# Patient Record
Sex: FEMALE | Race: BLACK OR AFRICAN AMERICAN | NOT HISPANIC OR LATINO | Employment: FULL TIME | URBAN - METROPOLITAN AREA
[De-identification: names, ages, dates, MRNs, and addresses within clinical notes are randomized per-mention and may not be internally consistent; named-entity substitution may affect disease eponyms.]

---

## 2017-01-16 ENCOUNTER — HOSPITAL ENCOUNTER (EMERGENCY)
Facility: OTHER | Age: 22
Discharge: HOME | End: 2017-01-16
Attending: EMERGENCY MEDICINE
Payer: MEDICAID

## 2017-01-16 VITALS
RESPIRATION RATE: 18 BRPM | HEART RATE: 97 BPM | OXYGEN SATURATION: 100 % | DIASTOLIC BLOOD PRESSURE: 89 MMHG | BODY MASS INDEX: 28.66 KG/M2 | SYSTOLIC BLOOD PRESSURE: 144 MMHG | HEIGHT: 65 IN | WEIGHT: 172 LBS | TEMPERATURE: 98 F

## 2017-01-16 DIAGNOSIS — S05.02XA CORNEAL ABRASION, LEFT, INITIAL ENCOUNTER: Primary | ICD-10-CM

## 2017-01-16 PROCEDURE — 99283 EMERGENCY DEPT VISIT LOW MDM: CPT

## 2017-01-16 PROCEDURE — 25000003 PHARM REV CODE 250: Performed by: EMERGENCY MEDICINE

## 2017-01-16 RX ORDER — PROPARACAINE HYDROCHLORIDE 5 MG/ML
2 SOLUTION/ DROPS OPHTHALMIC
Status: COMPLETED | OUTPATIENT
Start: 2017-01-16 | End: 2017-01-16

## 2017-01-16 RX ORDER — TETRACAINE HYDROCHLORIDE 5 MG/ML
2 SOLUTION OPHTHALMIC
Status: DISCONTINUED | OUTPATIENT
Start: 2017-01-16 | End: 2017-01-16

## 2017-01-16 RX ADMIN — TOBRAMYCIN 0.5 INCH: 3 OINTMENT OPHTHALMIC at 05:01

## 2017-01-16 RX ADMIN — PROPARACAINE HYDROCHLORIDE 2 DROP: 5 SOLUTION/ DROPS OPHTHALMIC at 05:01

## 2017-01-16 RX ADMIN — FLUORESCEIN SODIUM 1 STRIP: 1 STRIP OPHTHALMIC at 05:01
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
## 2017-01-16 NOTE — ED AVS SNAPSHOT
[facility] EMERGENCY DEPARTMENT  [address]  [place] [address]               Mackenzie Smart   2017  4:51 PM   ED    Description:  Female : 1995   Department:  [facility] Emergency Department           Your Care was Coordinated By:     Provider Role From To    Genesis Flores MD Attending Provider 17 [phone] --      Reason for Visit     Eye Injury           Diagnoses this Visit        Comments    Corneal abrasion, left, initial encounter    -  Primary       ED Disposition     ED Disposition Condition Comment    Discharge             To Do List           Follow-up Information     Please follow up.    Why:  [facility] Referral Line (Tel: [phone])       These Medications        Disp Refills Start End    tobramycin sulfate 0.3% (TOBREX) 0.3 % ophthalmic ointment 1 Tube 0 2017    Place into the left eye 3 (three) times daily. - Left Eye    Pharmacy: [facility] [address] [place] [address] [address] #: [phone]         [facility] On Call     [facility] On Call Nurse Care Line -  Assistance  Registered nurses in the [facility] provide clinical advisement, health education, appointment booking, and other advisory services.  Call for this free service at [phone].             Medications           Message regarding Medications     Verify the changes and/or additions to your medication regime listed below are the same as discussed with your clinician today.  If any of these changes or additions are incorrect, please notify your healthcare provider.        START taking these NEW medications        Refills    tobramycin sulfate 0.3% (TOBREX) 0.3 % ophthalmic ointment 0    Sig: Place into the left eye 3 (three) times daily.    Class: Print    Route: Left Eye      These medications were administered today        Dose Freq    fluorescein ophthalmic strip 1 strip 1 strip ED 1 Time    Sig: Place 1 strip into the  left eye ED 1 Time.    Class: Normal    Route: Left Eye    proparacaine 0.5 % ophthalmic solution 2 drop 2 drop ED 1 Time    Sig: Place 2 drops into the left eye ED 1 Time.    Class: Normal    Route: Left Eye    tobramycin sulfate 0.3% ophthalmic ointment 0.5 inch 0.5 inch ED 1 Time    Sig: Place 0.5 inches into the left eye ED 1 Time.    Class: Normal    Route: Left Eye           Verify that the below list of medications is an accurate representation of the medications you are currently taking.  If none reported, the list may be blank. If incorrect, please contact your healthcare provider. Carry this list with you in case of emergency.           Current Medications     albuterol (VENTOLIN HFA) 90 mcg/actuation inhaler Inhale 2 puffs into the lungs every 6 (six) hours as needed for Wheezing.    ferrous gluconate (FERGON) 324 MG tablet Take 1 tablet (324 mg total) by mouth daily with breakfast.    fluorescein ophthalmic strip 1 strip Place 1 strip into the left eye ED 1 Time.    medroxyPROGESTERone (DEPO-PROVERA) 150 mg/mL Syrg Inject 1 mL (150 mg total) into the muscle every 3 (three) months. Bring to the office for administration.    medroxyPROGESTERone (DEPO-PROVERA) syringe 150 mg Inject 1 mL (150 mg total) into the muscle every 3 (three) months.    oxycodone-acetaminophen (PERCOCET) 5-325 mg per tablet Take 1 tablet by mouth every 4 (four) hours as needed for Pain (severe pain).    prenatal vit#103-iron-FA () 27 mg iron- 1 mg Tab Take 1 tablet by mouth once daily.    proparacaine 0.5 % ophthalmic solution 2 drop Place 2 drops into the left eye ED 1 Time.    tobramycin sulfate 0.3% (TOBREX) 0.3 % ophthalmic ointment Place into the left eye 3 (three) times daily.    tobramycin sulfate 0.3% ophthalmic ointment 0.5 inch Place 0.5 inches into the left eye ED 1 Time.           Clinical Reference Information           Your Vitals Were     BP Pulse Temp Resp Height Weight    144/89 (BP Location: Left arm, Patient  "Position: Sitting) 97 98 °F (36.7 °C) (Temporal) 18 5' 5" (1.651 m) 78 kg (172 lb)    SpO2 BMI             100% 28.62 kg/m2         Allergies as of 1/16/2017     No Known Allergies      Immunizations Administered on Date of Encounter - 1/16/2017     None      ED Micro, Lab, POCT     None      ED Imaging Orders     None        Discharge Instructions             Corneal Injury  An eye injury can hurt your cornea. Your cornea is the clear layer on the front of your eye. It protects your eye from dust and germs, and helps filter out harmful UV (ultraviolet) rays. The cornea also helps to focus light entering your eye. Your cornea is made of strong proteins, but it can be damaged. A slight cut or scratch (abrasion) to the cornea can be very painful. But that is often minor and can heal within 1 or 2 days. A bad abrasion or a hole (puncture) in the cornea can be very serious. These are medical emergencies.     A blow to the eye can cause damage to the cornea (front layer of the eye).   Something in your eye  If you think you have something small in your eye, flush it with water right away. Pull your upper lid out and over your bottom lid. This will help increase the flow of tears across your eye. If these methods dont work, call your healthcare provider. Never try to remove an object from your eye that doesnt flush out easily with water. Doing so may cause more damage.  When to go to the emergency room (ER)  Call 911 or your local emergency number if you have:  · Severe eye pain  · A puncture injury or bad abrasion  · Something in your eye that you cant flush out with water  · A very swollen or painful eye after removing an object  · A chemical burn  · An object embedded in your eye. (Cover both eyes with a sterile compress and keep both eyes closed while you wait for help. DO NOT put any pressure on your eyes.)  What to expect in the ER  For minor abrasions   Minor abrasions are usually treated with eye drops or " ointment. You may be given antibiotics to prevent infection. Most abrasions heal in 1 or 2 days. To help rule out more serious injuries, you may have tests including:  · A standard eye exam to check how well you can see  · A Ping test, which uses a special dye to look for severe eye damage  Depending on the results of these tests, you may be referred to an eye specialist (ophthalmologist).  For serious abrasions or punctures  You will be referred directly to an ophthalmologist for emergency treatment. An eye specialist is needed to reduce further damage and possible vision loss.  © 9359-9838 [employer]. [address] [place] [address]. All rights reserved. This information is not intended as a substitute for professional medical care. Always follow your healthcare professional's instructions.          Your Scheduled Appointments     Jan 20, 2017  1:30 PM CST   Pre Op with Rober Sy MD   [facility])    [address] [place] [address]   [phone]            Jan 20, 2017  2:30 PM CST   Pre-Admit Testing Visit with PRE-ADMIT 1, [facility])    [address] [place] [id number] [phone]              Your Future Surgeries/Procedures     Jan 23, 2017   Surgery with Rober Sy MD   [facility] ([facility])    [address] [place] [address]   [phone]              [facility] Sign-Up     Activating your [facility] account is as easy as 1-2-3!     1) Visit [email or web address], select Sign Up Now, enter this activation code and your date of birth, then select Next.  [email or web address]  Expires: 1/17/2017  8:53 AM      2) Create a username and password to use when you visit [facility] in the future and select a security question in case you lose your password and select Next.    3) Enter your e-mail address and click Sign Up!    Additional Information  If you  have questions, please e-mail [email or web address] or call [phone] to talk to our [facility] staff. Remember, [facility] is NOT to be used for urgent needs. For medical emergencies, dial 911.          [facility] Emergency Department complies with applicable Federal civil rights laws and does not discriminate on the basis of race, color, national origin, age, disability, or sex.        Language Assistance Services     ATTENTION: Language assistance services are available, free of charge. Please call [phone].      ATENCIÓN: Si habla español, tiene a gonzalez disposición servicios gratuitos de asistencia lingüística. Llame al [phone].     CHÚ Ý: N?u b?n nói Ti?ng Vi?t, có các d?ch v? h? tr? ngôn ng? mi?n phí dành cho b?n. G?i s? [phone].

## 2017-01-16 NOTE — DISCHARGE INSTRUCTIONS
Corneal Injury  An eye injury can hurt your cornea. Your cornea is the clear layer on the front of your eye. It protects your eye from dust and germs, and helps filter out harmful UV (ultraviolet) rays. The cornea also helps to focus light entering your eye. Your cornea is made of strong proteins, but it can be damaged. A slight cut or scratch (abrasion) to the cornea can be very painful. But that is often minor and can heal within 1 or 2 days. A bad abrasion or a hole (puncture) in the cornea can be very serious. These are medical emergencies.     A blow to the eye can cause damage to the cornea (front layer of the eye).   Something in your eye  If you think you have something small in your eye, flush it with water right away. Pull your upper lid out and over your bottom lid. This will help increase the flow of tears across your eye. If these methods dont work, call your healthcare provider. Never try to remove an object from your eye that doesnt flush out easily with water. Doing so may cause more damage.  When to go to the emergency room (ER)  Call 911 or your local emergency number if you have:  · Severe eye pain  · A puncture injury or bad abrasion  · Something in your eye that you cant flush out with water  · A very swollen or painful eye after removing an object  · A chemical burn  · An object embedded in your eye. (Cover both eyes with a sterile compress and keep both eyes closed while you wait for help. DO NOT put any pressure on your eyes.)  What to expect in the ER  For minor abrasions   Minor abrasions are usually treated with eye drops or ointment. You may be given antibiotics to prevent infection. Most abrasions heal in 1 or 2 days. To help rule out more serious injuries, you may have tests including:  · A standard eye exam to check how well you can see  · A Ping test, which uses a special dye to look for severe eye damage  Depending on the results of these tests, you may be referred to an  eye specialist (ophthalmologist).  For serious abrasions or punctures  You will be referred directly to an ophthalmologist for emergency treatment. An eye specialist is needed to reduce further damage and possible vision loss.  © 1660-2361 [employer]. [address] [place] [address], [place] [address]. All rights reserved. This information is not intended as a substitute for professional medical care. Always follow your healthcare professional's instructions.

## 2017-01-20 ENCOUNTER — TELEPHONE (OUTPATIENT)
Dept: OPHTHALMOLOGY | Facility: CLINIC | Age: 22
End: 2017-01-20

## 2017-01-20 ENCOUNTER — OFFICE VISIT (OUTPATIENT)
Dept: OPHTHALMOLOGY | Facility: CLINIC | Age: 22
End: 2017-01-20
Payer: MEDICAID

## 2017-01-20 DIAGNOSIS — H20.9 TRAUMATIC IRITIS: Primary | ICD-10-CM

## 2017-01-20 PROCEDURE — 99999 PR PBB SHADOW E&M-EST. PATIENT-LVL III: CPT | Mod: PBBFAC,,, | Performed by: OPHTHALMOLOGY

## 2017-01-20 PROCEDURE — 99213 OFFICE O/P EST LOW 20 MIN: CPT | Mod: PBBFAC | Performed by: OPHTHALMOLOGY

## 2017-01-20 PROCEDURE — 92004 COMPRE OPH EXAM NEW PT 1/>: CPT | Mod: S$PBB,,, | Performed by: OPHTHALMOLOGY

## 2017-01-20 RX ORDER — PREDNISOLONE ACETATE 10 MG/ML
1 SUSPENSION/ DROPS OPHTHALMIC 4 TIMES DAILY
Qty: 10 ML | Refills: 1 | Status: SHIPPED | OUTPATIENT
Start: 2017-01-20 | End: 2017-02-01 | Stop reason: CLARIF
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
## 2017-01-20 NOTE — LETTER
January 20, 2017      [facility] - Ophthalmology  [address]  [facility] [address]  Phone: [phone]  Fax: [phone]       Patient: Mackenzie Smart   YOB: 1995  Date of Visit: 01/20/2017    To Whom It May Concern:    Mackenzie was at [facility] on 01/20/2017. Please excuse patient from 1/15/2017 to 1/20/2017.  She may return to work on 1/22/2017 no  restrictions. If you have any questions or concerns, or if I can be of further assistance, please do not hesitate to contact me.    Sincerely,  Dr. Chiara Roa M.D.    Rober Lazcano MA

## 2017-01-20 NOTE — MR AVS SNAPSHOT
[facility]  [address]  [facility] [address]  Phone: [phone]  Fax: [phone]                  Mackenzie Smart   2017 1:00 PM   Office Visit    Description:  Female : 1995   Provider:  Chiara Roa MD   Department:  [facility]           Reason for Visit     Corneal abrasion           Diagnoses this Visit        Comments    Traumatic iritis    -  Primary            To Do List           Future Appointments        Provider Department Dept Phone    2017  2:30 PM PRE-ADMIT 1, [facility] [phone]      Your Future Surgeries/Procedures     2017   Surgery with Rober Sy MD   [facility] ([facility])    [address] [place] [id number] [phone]              Goals (5 Years of Data)     None       These Medications        Disp Refills Start End    prednisoLONE acetate (PRED FORTE) 1 % DrpS 10 mL 1 2017    Place 1 drop into the left eye 4 (four) times daily. - Left Eye    Pharmacy: [facility] [address] [place] [address] [facility] Ph #: [phone]       homatropine (ISOPTO HOMATROPINE) 2 % ophthalmic solution 5 mL 0 2017     Place 1 drop into the left eye once daily. - Left Eye    Pharmacy: [facility] [address] [place] [address] [address] [facility] Ph #: [phone]         [facility] On Call     [facility] Nurse Care Line -  Assistance  Registered nurses in the [facility] provide clinical advisement, health education, appointment booking, and other advisory services.  Call for this free service at [phone].             Medications           Message regarding Medications     Verify the changes and/or additions to your medication regime listed below are the same as discussed with your clinician today.  If any of these changes or additions are  incorrect, please notify your healthcare provider.        START taking these NEW medications        Refills    prednisoLONE acetate (PRED FORTE) 1 % DrpS 1    Sig: Place 1 drop into the left eye 4 (four) times daily.    Class: Normal    Route: Left Eye    homatropine (ISOPTO HOMATROPINE) 2 % ophthalmic solution 0    Sig: Place 1 drop into the left eye once daily.    Class: Normal    Route: Left Eye      STOP taking these medications     oxycodone-acetaminophen (PERCOCET) 5-325 mg per tablet Take 1 tablet by mouth every 4 (four) hours as needed for Pain (severe pain).           Verify that the below list of medications is an accurate representation of the medications you are currently taking.  If none reported, the list may be blank. If incorrect, please contact your healthcare provider. Carry this list with you in case of emergency.           Current Medications     albuterol (VENTOLIN HFA) 90 mcg/actuation inhaler Inhale 2 puffs into the lungs every 6 (six) hours as needed for Wheezing.    ferrous gluconate (FERGON) 324 MG tablet Take 1 tablet (324 mg total) by mouth daily with breakfast.    homatropine (ISOPTO HOMATROPINE) 2 % ophthalmic solution Place 1 drop into the left eye once daily.    medroxyPROGESTERone (DEPO-PROVERA) 150 mg/mL Syrg Inject 1 mL (150 mg total) into the muscle every 3 (three) months. Bring to the office for administration.    prednisoLONE acetate (PRED FORTE) 1 % DrpS Place 1 drop into the left eye 4 (four) times daily.    prenatal vit#103-iron-FA () 27 mg iron- 1 mg Tab Take 1 tablet by mouth once daily.    tobramycin sulfate 0.3% (TOBREX) 0.3 % ophthalmic ointment Place into the left eye 3 (three) times daily.           Clinical Reference Information           Allergies as of 1/20/2017     No Known Allergies      Immunizations Administered on Date of Encounter - 1/20/2017     None      [facility] Sign-Up     Activating your for; to ([facility] account is as easy as 1-2-3!     1) Visit  [email or web address], select Sign Up Now, enter this activation code and your date of birth, then select Next.  [email or web address]  Expires: 3/6/2017  1:29 PM      2) Create a username and password to use when you visit [facility] in the future and select a security question in case you lose your password and select Next.    3) Enter your e-mail address and click Sign Up!    Additional Information  If you have questions, please e-mail [email or web address] or call [phone] to talk to our [facility] staff. Remember, [facility] is NOT to be used for urgent needs. For medical emergencies, dial 911.         Instructions    PRED FORTE - SHAKE WELL - 4 TIMES A DAY LEFT EYE    HOMATROPINE (RED TOP) - ONCE A DAY LEFT EYE

## 2017-01-20 NOTE — LETTER
January 20, 2017      [facility] - Ophthalmology  [address]  [place] [address]  Phone: [phone]  Fax: [phone]       Patient: Mackenzie Smart   YOB: 1995  Date of Visit: 01/20/2017    To Whom It May Concern:    Mackenzie was at [facility] on 01/20/2017. She may return to work on 1/22/2017 no restrictions. If you have any questions or concerns, or if I can be of further assistance, please do not hesitate to contact me.    Sincerely,  Dr.Ginny Crispin WADE.    Rober Lazcano MA

## 2017-01-31 ENCOUNTER — ANESTHESIA EVENT (OUTPATIENT)
Dept: SURGERY | Facility: HOSPITAL | Age: 22
End: 2017-01-31
Payer: MEDICAID

## 2017-02-01 ENCOUNTER — HOSPITAL ENCOUNTER (OUTPATIENT)
Facility: HOSPITAL | Age: 22
Discharge: HOME | End: 2017-02-01
Attending: OBSTETRICS & GYNECOLOGY | Admitting: OBSTETRICS & GYNECOLOGY
Payer: MEDICAID

## 2017-02-01 ENCOUNTER — ANESTHESIA (OUTPATIENT)
Dept: SURGERY | Facility: HOSPITAL | Age: 22
End: 2017-02-01
Payer: MEDICAID

## 2017-02-01 ENCOUNTER — HOSPITAL ENCOUNTER (OUTPATIENT)
Dept: PREADMISSION TESTING | Facility: HOSPITAL | Age: 22
Discharge: HOME | End: 2017-02-01
Attending: OBSTETRICS & GYNECOLOGY
Payer: MEDICAID

## 2017-02-01 VITALS
SYSTOLIC BLOOD PRESSURE: 124 MMHG | TEMPERATURE: 98 F | DIASTOLIC BLOOD PRESSURE: 78 MMHG | HEART RATE: 80 BPM | RESPIRATION RATE: 20 BRPM | OXYGEN SATURATION: 99 %

## 2017-02-01 VITALS
HEIGHT: 65 IN | DIASTOLIC BLOOD PRESSURE: 98 MMHG | SYSTOLIC BLOOD PRESSURE: 135 MMHG | OXYGEN SATURATION: 98 % | WEIGHT: 167.56 LBS | BODY MASS INDEX: 27.92 KG/M2 | RESPIRATION RATE: 18 BRPM | TEMPERATURE: 98 F | HEART RATE: 86 BPM

## 2017-02-01 DIAGNOSIS — N89.8 VAGINAL LACERATION, OLD: ICD-10-CM

## 2017-02-01 LAB
ABO + RH BLD: NORMAL
B-HCG UR QL: NEGATIVE
BASOPHILS # BLD AUTO: 0.04 K/UL
BASOPHILS NFR BLD: 0.4 %
BILIRUB UR QL STRIP: NEGATIVE
BLD GP AB SCN CELLS X3 SERPL QL: NORMAL
CLARITY UR: CLEAR
COLOR UR: ABNORMAL
DIFFERENTIAL METHOD: ABNORMAL
EOSINOPHIL # BLD AUTO: 0.6 K/UL
EOSINOPHIL NFR BLD: 6.3 %
ERYTHROCYTE [DISTWIDTH] IN BLOOD BY AUTOMATED COUNT: 16 %
GLUCOSE UR QL STRIP: NEGATIVE
HCT VFR BLD AUTO: 35.8 %
HGB BLD-MCNC: 11.7 G/DL
HGB UR QL STRIP: ABNORMAL
KETONES UR QL STRIP: NEGATIVE
LEUKOCYTE ESTERASE UR QL STRIP: NEGATIVE
LYMPHOCYTES # BLD AUTO: 1.2 K/UL
LYMPHOCYTES NFR BLD: 11.2 %
MCH RBC QN AUTO: 27 PG
MCHC RBC AUTO-ENTMCNC: 32.7 %
MCV RBC AUTO: 83 FL
MICROSCOPIC COMMENT: NORMAL
MONOCYTES # BLD AUTO: 0.6 K/UL
MONOCYTES NFR BLD: 5.4 %
NEUTROPHILS # BLD AUTO: 7.9 K/UL
NEUTROPHILS NFR BLD: 76.7 %
NITRITE UR QL STRIP: NEGATIVE
PH UR STRIP: 7 [PH] (ref 5–8)
PLATELET # BLD AUTO: 355 K/UL
PMV BLD AUTO: 9.6 FL
PROT UR QL STRIP: NEGATIVE
RBC # BLD AUTO: 4.33 M/UL
RBC #/AREA URNS HPF: 0 /HPF (ref 0–4)
SP GR UR STRIP: 1.01 (ref 1–1.03)
SQUAMOUS #/AREA URNS HPF: NORMAL /HPF
URN SPEC COLLECT METH UR: ABNORMAL
UROBILINOGEN UR STRIP-ACNC: NEGATIVE EU/DL
WBC # BLD AUTO: 10.24 K/UL

## 2017-02-01 PROCEDURE — 81000 URINALYSIS NONAUTO W/SCOPE: CPT

## 2017-02-01 PROCEDURE — D9220A PRA ANESTHESIA: Mod: CRNA,,, | Performed by: REGISTERED NURSE

## 2017-02-01 PROCEDURE — 36000707: Performed by: OBSTETRICS & GYNECOLOGY

## 2017-02-01 PROCEDURE — 25000003 PHARM REV CODE 250: Performed by: REGISTERED NURSE

## 2017-02-01 PROCEDURE — 81025 URINE PREGNANCY TEST: CPT

## 2017-02-01 PROCEDURE — 86900 BLOOD TYPING SEROLOGIC ABO: CPT

## 2017-02-01 PROCEDURE — 63600175 PHARM REV CODE 636 W HCPCS: Performed by: OBSTETRICS & GYNECOLOGY

## 2017-02-01 PROCEDURE — 27200651 HC AIRWAY, LMA: Performed by: REGISTERED NURSE

## 2017-02-01 PROCEDURE — 36415 COLL VENOUS BLD VENIPUNCTURE: CPT

## 2017-02-01 PROCEDURE — 86850 RBC ANTIBODY SCREEN: CPT

## 2017-02-01 PROCEDURE — 85025 COMPLETE CBC W/AUTO DIFF WBC: CPT

## 2017-02-01 PROCEDURE — 71000016 HC POSTOP RECOV ADDL HR: Performed by: OBSTETRICS & GYNECOLOGY

## 2017-02-01 PROCEDURE — 71000015 HC POSTOP RECOV 1ST HR: Performed by: OBSTETRICS & GYNECOLOGY

## 2017-02-01 PROCEDURE — 25000003 PHARM REV CODE 250: Performed by: ANESTHESIOLOGY

## 2017-02-01 PROCEDURE — 63600175 PHARM REV CODE 636 W HCPCS: Performed by: ANESTHESIOLOGY

## 2017-02-01 PROCEDURE — D9220A PRA ANESTHESIA: Mod: ANES,,, | Performed by: ANESTHESIOLOGY

## 2017-02-01 PROCEDURE — 37000009 HC ANESTHESIA EA ADD 15 MINS: Performed by: OBSTETRICS & GYNECOLOGY

## 2017-02-01 PROCEDURE — 71000039 HC RECOVERY, EACH ADD'L HOUR: Performed by: OBSTETRICS & GYNECOLOGY

## 2017-02-01 PROCEDURE — 37000008 HC ANESTHESIA 1ST 15 MINUTES: Performed by: OBSTETRICS & GYNECOLOGY

## 2017-02-01 PROCEDURE — 71000033 HC RECOVERY, INTIAL HOUR: Performed by: OBSTETRICS & GYNECOLOGY

## 2017-02-01 PROCEDURE — 63600175 PHARM REV CODE 636 W HCPCS: Performed by: REGISTERED NURSE

## 2017-02-01 PROCEDURE — 36000706: Performed by: OBSTETRICS & GYNECOLOGY

## 2017-02-01 RX ORDER — CEFAZOLIN SODIUM 2 G/50ML
2 SOLUTION INTRAVENOUS
Status: COMPLETED | OUTPATIENT
Start: 2017-02-01 | End: 2017-02-01

## 2017-02-01 RX ORDER — DIPHENHYDRAMINE HYDROCHLORIDE 50 MG/ML
25 INJECTION INTRAMUSCULAR; INTRAVENOUS EVERY 4 HOURS PRN
Status: DISCONTINUED | OUTPATIENT
Start: 2017-02-01 | End: 2017-02-01 | Stop reason: HOSPADM

## 2017-02-01 RX ORDER — HYDROCODONE BITARTRATE AND ACETAMINOPHEN 5; 325 MG/1; MG/1
1 TABLET ORAL EVERY 4 HOURS PRN
Status: DISCONTINUED | OUTPATIENT
Start: 2017-02-01 | End: 2017-02-01 | Stop reason: HOSPADM

## 2017-02-01 RX ORDER — SODIUM CHLORIDE 0.9 % (FLUSH) 0.9 %
3 SYRINGE (ML) INJECTION EVERY 8 HOURS
Status: DISCONTINUED | OUTPATIENT
Start: 2017-02-01 | End: 2017-02-01 | Stop reason: HOSPADM

## 2017-02-01 RX ORDER — IBUPROFEN 400 MG/1
800 TABLET ORAL EVERY 8 HOURS
Status: DISCONTINUED | OUTPATIENT
Start: 2017-02-02 | End: 2017-02-01 | Stop reason: HOSPADM

## 2017-02-01 RX ORDER — IBUPROFEN 800 MG/1
800 TABLET ORAL EVERY 8 HOURS PRN
Qty: 30 TABLET | Refills: 2 | Status: SHIPPED | OUTPATIENT
Start: 2017-02-01 | End: 2017-03-30

## 2017-02-01 RX ORDER — SODIUM CHLORIDE, SODIUM LACTATE, POTASSIUM CHLORIDE, CALCIUM CHLORIDE 600; 310; 30; 20 MG/100ML; MG/100ML; MG/100ML; MG/100ML
INJECTION, SOLUTION INTRAVENOUS CONTINUOUS
Status: DISCONTINUED | OUTPATIENT
Start: 2017-02-01 | End: 2017-02-01 | Stop reason: HOSPADM

## 2017-02-01 RX ORDER — MIDAZOLAM HYDROCHLORIDE 1 MG/ML
INJECTION, SOLUTION INTRAMUSCULAR; INTRAVENOUS
Status: DISCONTINUED | OUTPATIENT
Start: 2017-02-01 | End: 2017-02-01

## 2017-02-01 RX ORDER — FENTANYL CITRATE 50 UG/ML
25 INJECTION, SOLUTION INTRAMUSCULAR; INTRAVENOUS EVERY 5 MIN PRN
Status: DISCONTINUED | OUTPATIENT
Start: 2017-02-01 | End: 2017-02-01 | Stop reason: HOSPADM

## 2017-02-01 RX ORDER — LIDOCAINE HYDROCHLORIDE 10 MG/ML
1 INJECTION, SOLUTION EPIDURAL; INFILTRATION; INTRACAUDAL; PERINEURAL ONCE
Status: DISCONTINUED | OUTPATIENT
Start: 2017-02-01 | End: 2017-02-01 | Stop reason: HOSPADM

## 2017-02-01 RX ORDER — ONDANSETRON 8 MG/1
8 TABLET, ORALLY DISINTEGRATING ORAL EVERY 8 HOURS PRN
Status: DISCONTINUED | OUTPATIENT
Start: 2017-02-01 | End: 2017-02-01 | Stop reason: HOSPADM

## 2017-02-01 RX ORDER — ONDANSETRON 8 MG/1
8 TABLET, ORALLY DISINTEGRATING ORAL EVERY 8 HOURS PRN
Status: DISCONTINUED | OUTPATIENT
Start: 2017-02-01 | End: 2017-02-01 | Stop reason: ALTCHOICE

## 2017-02-01 RX ORDER — METOCLOPRAMIDE HYDROCHLORIDE 5 MG/ML
INJECTION INTRAMUSCULAR; INTRAVENOUS
Status: DISCONTINUED | OUTPATIENT
Start: 2017-02-01 | End: 2017-02-01

## 2017-02-01 RX ORDER — LIDOCAINE HCL/PF 100 MG/5ML
SYRINGE (ML) INTRAVENOUS
Status: DISCONTINUED | OUTPATIENT
Start: 2017-02-01 | End: 2017-02-01

## 2017-02-01 RX ORDER — FENTANYL CITRATE 50 UG/ML
INJECTION, SOLUTION INTRAMUSCULAR; INTRAVENOUS
Status: DISCONTINUED | OUTPATIENT
Start: 2017-02-01 | End: 2017-02-01

## 2017-02-01 RX ORDER — KETOROLAC TROMETHAMINE 30 MG/ML
30 INJECTION, SOLUTION INTRAMUSCULAR; INTRAVENOUS EVERY 6 HOURS
Status: DISCONTINUED | OUTPATIENT
Start: 2017-02-01 | End: 2017-02-01 | Stop reason: HOSPADM

## 2017-02-01 RX ORDER — HYDROMORPHONE HYDROCHLORIDE 2 MG/ML
0.2 INJECTION, SOLUTION INTRAMUSCULAR; INTRAVENOUS; SUBCUTANEOUS EVERY 5 MIN PRN
Status: DISCONTINUED | OUTPATIENT
Start: 2017-02-01 | End: 2017-02-01 | Stop reason: HOSPADM

## 2017-02-01 RX ORDER — CEFAZOLIN SODIUM 2 G/50ML
SOLUTION INTRAVENOUS
Status: DISCONTINUED
Start: 2017-02-01 | End: 2017-02-01 | Stop reason: HOSPADM

## 2017-02-01 RX ORDER — OXYCODONE AND ACETAMINOPHEN 5; 325 MG/1; MG/1
1 TABLET ORAL EVERY 4 HOURS PRN
Qty: 30 TABLET | Refills: 0 | Status: SHIPPED | OUTPATIENT
Start: 2017-02-01 | End: 2017-02-09 | Stop reason: SDUPTHER

## 2017-02-01 RX ORDER — DIPHENHYDRAMINE HCL 25 MG
25 CAPSULE ORAL EVERY 4 HOURS PRN
Status: DISCONTINUED | OUTPATIENT
Start: 2017-02-01 | End: 2017-02-01 | Stop reason: HOSPADM

## 2017-02-01 RX ORDER — SODIUM CHLORIDE 0.9 % (FLUSH) 0.9 %
3 SYRINGE (ML) INJECTION
Status: DISCONTINUED | OUTPATIENT
Start: 2017-02-01 | End: 2017-02-01 | Stop reason: HOSPADM

## 2017-02-01 RX ORDER — PROPOFOL 10 MG/ML
VIAL (ML) INTRAVENOUS
Status: DISCONTINUED | OUTPATIENT
Start: 2017-02-01 | End: 2017-02-01

## 2017-02-01 RX ADMIN — HYDROMORPHONE HYDROCHLORIDE 0.2 MG: 2 INJECTION INTRAMUSCULAR; INTRAVENOUS; SUBCUTANEOUS at 01:02

## 2017-02-01 RX ADMIN — FENTANYL CITRATE 50 MCG: 50 INJECTION INTRAMUSCULAR; INTRAVENOUS at 11:02

## 2017-02-01 RX ADMIN — CEFAZOLIN SODIUM 2 G: 2 SOLUTION INTRAVENOUS at 11:02

## 2017-02-01 RX ADMIN — HYDROMORPHONE HYDROCHLORIDE 0.2 MG: 2 INJECTION INTRAMUSCULAR; INTRAVENOUS; SUBCUTANEOUS at 12:02

## 2017-02-01 RX ADMIN — FENTANYL CITRATE 25 MCG: 50 INJECTION, SOLUTION INTRAMUSCULAR; INTRAVENOUS at 12:02

## 2017-02-01 RX ADMIN — MIDAZOLAM HYDROCHLORIDE 2 MG: 1 INJECTION, SOLUTION INTRAMUSCULAR; INTRAVENOUS at 11:02

## 2017-02-01 RX ADMIN — METOCLOPRAMIDE 10 MG: 5 INJECTION, SOLUTION INTRAMUSCULAR; INTRAVENOUS at 11:02

## 2017-02-01 RX ADMIN — PROPOFOL 200 MG: 10 INJECTION, EMULSION INTRAVENOUS at 11:02

## 2017-02-01 RX ADMIN — SODIUM CHLORIDE, SODIUM LACTATE, POTASSIUM CHLORIDE, AND CALCIUM CHLORIDE: .6; .31; .03; .02 INJECTION, SOLUTION INTRAVENOUS at 09:02

## 2017-02-01 RX ADMIN — LIDOCAINE HYDROCHLORIDE 100 MG: 20 INJECTION, SOLUTION INTRAVENOUS at 11:02

## 2017-02-01 RX ADMIN — PROPOFOL 50 MG: 10 INJECTION, EMULSION INTRAVENOUS at 11:02

## 2017-02-01 NOTE — ANESTHESIA PREPROCEDURE EVALUATION
02/01/2017  Mackenzie Smart is a 21 y.o., female.    [facility] Anesthesia Evaluation    I have reviewed the Patient Summary Reports.     I have reviewed the Medications.     Review of Systems  Anesthesia Hx:  History of prior surgery of interest to airway management or planning:  Denies Personal Hx of Anesthesia complications.   Hematology/Oncology:  Hematology Normal        Cardiovascular:  Cardiovascular Normal     Pulmonary:   Asthma    Renal/:  Renal/ Normal     Hepatic/GI:  Hepatic/GI Normal    Endocrine:  Endocrine Normal        Physical Exam  General:  Well nourished    Airway/Jaw/Neck:  Airway Findings: Mouth Opening: Normal General Airway Assessment: Adult  Mallampati: II  TM Distance: Normal, at least 6 cm         Dental:  DENTAL FINDINGS: Normal   Chest/Lungs:  Chest/Lungs Clear    Heart/Vascular:  Heart Findings: Normal Heart murmur: negative       Mental Status:  Mental Status Findings:  Cooperative, Alert and Oriented         Anesthesia Plan  Type of Anesthesia, risks & benefits discussed:  Anesthesia Type:  general  Patient's Preference:   Intra-op Monitoring Plan:   Intra-op Monitoring Plan Comments:   Post Op Pain Control Plan:   Post Op Pain Control Plan Comments:   Induction:   IV  Beta Blocker:  Patient is not currently on a Beta-Blocker (No further documentation required).       Informed Consent: Patient understands risks and agrees with Anesthesia plan.  Questions answered. Anesthesia consent signed with patient.  ASA Score: 2     Day of Surgery Review of History & Physical:            Ready For Surgery From Anesthesia Perspective.

## 2017-02-01 NOTE — IP AVS SNAPSHOT
[place] [address] [place] [address]  Phone: [phone]           Patient Discharge Instructions     Our goal is to set you up for success. This packet includes information on your condition, medications, and your home care. It will help you to care for yourself so you don't get sicker and need to go back to the hospital.     Please ask your nurse if you have any questions.        There are many details to remember when preparing to leave the hospital. Here is what you will need to do:    1. Take your medicine. If you are prescribed medications, review your Medication List in the following pages. You may have new medications to  at the pharmacy and others that you'll need to stop taking. Review the instructions for how and when to take your medications. Talk with your doctor or nurses if you are unsure of what to do.     2. Go to your follow-up appointments. Specific follow-up information is listed in the following pages. Your may be contacted by a transition nurse or clinical provider about future appointments. Be sure we have all of the phone numbers to reach you, if needed. Please contact your provider's office if you are unable to make an appointment.     3. Watch for warning signs. Your doctor or nurse will give you detailed warning signs to watch for and when to call for assistance. These instructions may also include educational information about your condition. If you experience any of warning signs to your health, call your doctor.               ** Verify the list of medication(s) below is accurate and up to date. Carry this with you in case of emergency. If your medications have changed, please notify your healthcare provider.             Medication List      START taking these medications        Additional Info                      ibuprofen 800 MG tablet   Commonly known as:  ADVIL,MOTRIN   Quantity:  30 tablet   Refills:  2   Dose:  800 mg    Instructions:   Take 1 tablet (800 mg total) by mouth every 8 (eight) hours as needed for Pain.     Begin Date    AM    Noon    PM    Bedtime       oxycodone-acetaminophen 5-325 mg per tablet   Commonly known as:  PERCOCET   Quantity:  30 tablet   Refills:  0   Dose:  1 tablet    Instructions:  Take 1 tablet by mouth every 4 (four) hours as needed for Pain.     Begin Date    AM    Noon    PM    Bedtime         CONTINUE taking these medications        Additional Info                      albuterol 90 mcg/actuation inhaler   Commonly known as:  VENTOLIN HFA   Quantity:  18 g   Refills:  0   Dose:  2 puff    Instructions:  Inhale 2 puffs into the lungs every 6 (six) hours as needed for Wheezing.     Begin Date    AM    Noon    PM    Bedtime       ferrous gluconate 324 MG tablet   Commonly known as:  FERGON   Quantity:  30 tablet   Refills:  5   Dose:  324 mg    Instructions:  Take 1 tablet (324 mg total) by mouth daily with breakfast.     Begin Date    AM    Noon    PM    Bedtime       medroxyPROGESTERone 150 mg/mL Syrg   Commonly known as:  DEPO-PROVERA   Quantity:  1 Syringe   Refills:  3   Dose:  150 mg    Instructions:  Inject 1 mL (150 mg total) into the muscle every 3 (three) months. Bring to the office for administration.     Begin Date    AM    Noon    PM    Bedtime         STOP taking these medications     prenatal vit#103-iron-FA 27 mg iron- 1 mg Tab   Commonly known as:              Where to Get Your Medications      These medications were sent to [facility] [id number] [place] [address] [facility]  [address] [facility] [place] [address]    Hours:  24-hours Phone:  [phone]     ibuprofen 800 MG tablet    oxycodone-acetaminophen 5-325 mg per tablet                  Please bring to all follow up appointments:    1. A copy of your discharge instructions.  2. All medicines you are currently taking in their original bottles.  3. Identification and insurance card.    Please arrive  15 minutes ahead of scheduled appointment time.    Please call 24 hours in advance if you must reschedule your appointment and/or time.        Your Scheduled Appointments     Feb 06, 2017  9:30 AM CST   Post Op-OCC with Rober Sy MD   [facility]    [address] [place] [address]  [place] [address]   [phone]              Follow-up Information     Follow up In 1 week.          Discharge Instructions       ***  BATHING:                   You may shower after your dressing is removed, but no tub baths, hot tubs, saunas or swimming until you see the doctor.    DRESSING:  ? Remove your bandage ________________. If there are skin tapes over the incision, leave them in place. They will start to come off in 5-7 days.  ACTIVITY LEVEL: If you have received sedation or an anesthetic, you may feel sleepy for   several hours. Rest until you are more awake. Gradually resume your normal activities  ? No heavy lifting or straining, nothing over 10 lbs., like a gallon of milk.  ? Pelvic rest- no sex, tampons or douching until follow up or instructed by doctor.  DIET:  You may resume your home diet. If nausea is present, increase your diet gradually with fluids and bland foods.    Medications:  Pain medication should be taken only if needed and as directed. If antibiotics are prescribed, the medication should be taken until completed. You will be given an updated list of you medications.  ? No driving, alcoholic beverages or signing legal documents for next 24 hours or while taking pain medication    CALL THE DOCTOR:    For any obvious bleeding (some dried blood over the incision is normal).      Redness, swelling, foul smell around incision or fever over 101.   Shortness of breath, Coughing up Bloody Sputum or Pains or Swelling in your calves.   Persistent pain or nausea not relieved by medication.   If vaginal bleeding is in excess of a normal period.   Problems urinating    If any unusual problems or  difficulties occur contact your doctor. If you cannot contact your doctor but feel your signs and symptoms warrant a physicians attention return to the emergency room.        Primary Diagnosis     Your primary diagnosis was:  Old Vaginal Laceration      Admission Information     Date & Time Provider Department CSN    2/1/2017  8:19 AM Rober Sy MD [facility] [id number]      Care Providers     Provider Role Specialty Primary office phone    Rober Sy MD Attending Provider Obstetrics and Gynecology [phone]    Rober Sy MD Surgeon  Obstetrics and Gynecology [phone]      Your Vitals Were     BP Pulse Temp Resp SpO2       128/82 (BP Location: Left arm, BP Method: Automatic) 79 98 °F (36.7 °C) (Oral) 20 98%       Recent Lab Values     No lab values to display.      Allergies as of 2/1/2017     No Known Allergies      [facility]     [facility] Nurse Care Line - 24/7 Assistance  Unless otherwise directed by your provider, please contact [facility], our nurse care line that is available for 24/7 assistance.     Registered nurses in the [facility] provide clinical advisement, health education, appointment booking, and other advisory services.  Call for this free service at [phone].        Advance Directives     An advance directive is a document which, in the event you are no longer able to make decisions for yourself, tells your healthcare team what kind of treatment you do or do not want to receive, or who you would like to make those decisions for you.  If you do not currently have an advance directive, [facility] encourages you to create one.  For more information call:  [phone]), [phone] ([phone]),  or log on to [email or web address].        Language Assistance Services     ATTENTION: Language assistance services are available, free of charge. Please call [phone].      ATENCIÓN: paul Caballero  disposición servicios gratuitos de asistencia lingüística. Hanh al [phone].     [facility] Ý: N?u b?n nói Ti?ng Vi?t, có các d?ch v? h? tr? ngôn ng? mi?n phí dành cho b?n. G?i s? [phone].        [facility] Sign-Up     Activating your [facility] account is as easy as 1-2-3!     1) Visit [email or web address], select Sign Up Now, enter this activation code and your date of birth, then select Next.  [email or web address]  Expires: 3/6/2017  1:29 PM      2) Create a username and password to use when you visit [facility] in the future and select a security question in case you lose your password and select Next.    3) Enter your e-mail address and click Sign Up!    Additional Information  If you have questions, please e-mail [email or web address] or call [phone] to talk to our [facility] staff. Remember, [facility] is NOT to be used for urgent needs. For medical emergencies, dial 911.          [facility] complies with applicable Federal civil rights laws and does not discriminate on the basis of race, color, national origin, age, disability, or sex.

## 2017-02-01 NOTE — ANESTHESIA POSTPROCEDURE EVALUATION
Anesthesia Post Evaluation    Patient: Mackenzie Smart    Procedure(s) Performed: Procedure(s) (LRB):  LABIAPLASTY (N/A)    Final Anesthesia Type: general  Patient location during evaluation: PACU  Patient participation: Yes- Able to Participate  Level of consciousness: awake and alert, oriented and awake  Post-procedure vital signs: reviewed and stable  Airway patency: patent  PONV status at discharge: No PONV  Anesthetic complications: no      Cardiovascular status: blood pressure returned to baseline  Respiratory status: unassisted, spontaneous ventilation and room air  Hydration status: euvolemic  Follow-up not needed.        Visit Vitals    /82    Pulse 80    Temp 37 °C (98.6 °F) (Tympanic)    Resp 14    SpO2 97%    Breastfeeding No       Pain/Michael Score: Pain Assessment Performed: Yes (2/1/2017 11:56 AM)  Presence of Pain: non-verbal indicators absent (sedated) (2/1/2017 11:56 AM)  Pain Rating Prior to Med Admin: 9 (2/1/2017 12:15 PM)  Michael Score: 5 (2/1/2017 11:56 AM)
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
## 2017-02-01 NOTE — OP NOTE
DATE OF PROCEDURE:  02/01/2017    PREOPERATIVE DIAGNOSIS:  Poorly healed anterior laceration of the right labia   minora.    POSTOPERATIVE DIAGNOSIS:  Poorly healed anterior laceration of the right labia   minora.    PROCEDURE:  Vaginal laceration repair/labia plasty.    SURGEON:  Rober Sy M.D.    ANESTHESIA:  General.    COMPLICATIONS:  None.    ESTIMATED BLOOD LOSS:  Minimal.    FINDINGS:  Right labia minora with a hole in it where the tissue was previously   sutured together, but then fell apart.    PROCEDURE IN DETAIL:  After assuring informed consent, the patient was taken to   the Operating Room where general anesthesia was initiated.  She was placed in   the high lithotomy position in candy cane stirrups.  Perineum was prepped with   iodine and the drapes were placed.  The patient's old laceration, which was   approximately 1 cm x 1 cm in greatest dimension was isolated and excised from   the surrounding healthy labia with a #15 blade.  This excision was done in a   wedge type fashion and the healthy tissue that surrounded this area was   reapproximated with simple interrupted sutures of 3-0 nylon.  This gave an   excellent closure with no undue tension on the labia.  The surgery took   approximately 10 minutes to do.  She was taken to the Recovery Room, awakened,   and stable condition.      AMAN/  dd: 02/01/2017 11:54:22 (CST)  td: 02/01/2017 12:08:33 (CST)  Doc ID   #[id number]  Job ID #[id number]    CC:

## 2017-02-01 NOTE — H&P (VIEW-ONLY)
21 y.o.  presents for pre-op H&P for revision of vaginal laceration repair for poorly healed vaginal lac.  No LMP recorded. Patient has had an injection..  Patient had  on 10/7/16 and had anterior tearing with a lac to her lab minor that did not heal well.  Patient has pain on intercourse and wishes to have the tissue reapproximated.  I have advised her that this surgery is not 100% and that the repair could also fall apart.     Past Medical History   Diagnosis Date    Asthma     Blood clot associated with vein wall inflammation      Past Surgical History   Procedure Laterality Date    Cholecystectomy       Family History   Problem Relation Age of Onset    Hypertension Mother     No Known Problems Brother     No Known Problems Brother     No Known Problems Brother     No Known Problems Father     No Known Problems Sister     No Known Problems Maternal Aunt     No Known Problems Maternal Uncle     No Known Problems Paternal Aunt     No Known Problems Paternal Uncle     No Known Problems Maternal Grandmother     No Known Problems Maternal Grandfather     No Known Problems Paternal Grandmother     No Known Problems Paternal Grandfather     Breast cancer Neg Hx     Colon cancer Neg Hx     Ovarian cancer Neg Hx     Amblyopia Neg Hx     Blindness Neg Hx     Cancer Neg Hx     Cataracts Neg Hx     Diabetes Neg Hx     Glaucoma Neg Hx     Macular degeneration Neg Hx     Retinal detachment Neg Hx     Strabismus Neg Hx     Stroke Neg Hx     Thyroid disease Neg Hx      Review of patient's allergies indicates:  No Known Allergies    Current Outpatient Prescriptions:     albuterol (VENTOLIN HFA) 90 mcg/actuation inhaler, Inhale 2 puffs into the lungs every 6 (six) hours as needed for Wheezing., Disp: 18 g, Rfl: 0    ferrous gluconate (FERGON) 324 MG tablet, Take 1 tablet (324 mg total) by mouth daily with breakfast., Disp: 30 tablet, Rfl: 5    homatropine (ISOPTO HOMATROPINE) 2 %  ophthalmic solution, Place 1 drop into the left eye once daily., Disp: 5 mL, Rfl: 0    medroxyPROGESTERone (DEPO-PROVERA) 150 mg/mL Syrg, Inject 1 mL (150 mg total) into the muscle every 3 (three) months. Bring to the office for administration., Disp: 1 Syringe, Rfl: 3    prednisoLONE acetate (PRED FORTE) 1 % DrpS, Place 1 drop into the left eye 4 (four) times daily., Disp: 10 mL, Rfl: 1    prenatal vit#103-iron-FA () 27 mg iron- 1 mg Tab, Take 1 tablet by mouth once daily., Disp: 30 tablet, Rfl: 11    Current Facility-Administered Medications:     medroxyPROGESTERone (DEPO-PROVERA) syringe 150 mg, 150 mg, Intramuscular, Q90 Days, Rober Sy MD, 150 mg at 12/29/16 1513  Social History     Social History    Marital status: Single     Spouse name: N/A    Number of children: N/A    Years of education: N/A     Occupational History    Not on file.     Social History Main Topics    Smoking status: Never Smoker    Smokeless tobacco: Not on file    Alcohol use No    Drug use: No    Sexual activity: Yes     Partners: Male     Birth control/ protection: None     Other Topics Concern    Not on file     Social History Narrative    Single.  One daughter.  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             Vitals:    01/31/17 1348   BP: (!) 140/81   Pulse: (!) 123     General Appearance: Alert, appropriate appearance for age. No acute distress, Chest/Respiratory Exam: normal, CTA  Cardiovascular Exam: RRR  Gastrointestinal Exam: soft, NT/ND  Pelvic Exam Female: right lab minor with hole where tear was repaired.    Psychiatric Exam: Alert and oriented, appropriate affect.    Assessment: poorly healed anterior vag laceration  Plan: vaginal laceration repair (labioplasty)   I have discussed the risks, benefits, indications, and alternatives of the procedure in detail.  The patient verbalizes her understanding.  All questions answered.  Consents signed.  The patient agrees to proceed to proceed as planned.

## 2017-02-01 NOTE — DISCHARGE INSTRUCTIONS
***  BATHING:                   You may shower after your dressing is removed, but no tub baths, hot tubs, saunas or swimming until you see the doctor.    DRESSING:  ? Remove your bandage ________________. If there are skin tapes over the incision, leave them in place. They will start to come off in 5-7 days.  ACTIVITY LEVEL: If you have received sedation or an anesthetic, you may feel sleepy for   several hours. Rest until you are more awake. Gradually resume your normal activities  ? No heavy lifting or straining, nothing over 10 lbs., like a gallon of milk.  ? Pelvic rest- no sex, tampons or douching until follow up or instructed by doctor.  DIET:  You may resume your home diet. If nausea is present, increase your diet gradually with fluids and bland foods.    Medications:  Pain medication should be taken only if needed and as directed. If antibiotics are prescribed, the medication should be taken until completed. You will be given an updated list of you medications.  ? No driving, alcoholic beverages or signing legal documents for next 24 hours or while taking pain medication    CALL THE DOCTOR:    For any obvious bleeding (some dried blood over the incision is normal).      Redness, swelling, foul smell around incision or fever over 101.   Shortness of breath, Coughing up Bloody Sputum or Pains or Swelling in your calves.   Persistent pain or nausea not relieved by medication.   If vaginal bleeding is in excess of a normal period.   Problems urinating    If any unusual problems or difficulties occur contact your doctor. If you cannot contact your doctor but feel your signs and symptoms warrant a physicians attention return to the emergency room.

## 2017-02-01 NOTE — INTERVAL H&P NOTE
The patient has been examined and the H&P has been reviewed:    I concur with the findings and no changes have occurred since H&P was written.    Anesthesia/Surgery risks, benefits and alternative options discussed and understood by patient/family.          Active Hospital Problems    Diagnosis  POA    Vaginal laceration, old [N89.8]  Yes      Resolved Hospital Problems    Diagnosis Date Resolved POA   No resolved problems to display.

## 2017-02-01 NOTE — TRANSFER OF CARE
Anesthesia Transfer of Care Note    Patient: Mackenzie Smart    Procedure(s) Performed: Procedure(s) (LRB):  LABIAPLASTY (N/A)    Patient location: PACU    Anesthesia Type: general    Transport from OR: Transported from OR on room air with adequate spontaneous ventilation    Post pain: adequate analgesia    Post assessment: no apparent anesthetic complications and tolerated procedure well    Post vital signs: stable    Level of consciousness: responds to stimulation    Nausea/Vomiting: no nausea/vomiting    Complications: none          Last vitals:   Visit Vitals    /78    Pulse 99    Temp 37 °C (98.6 °F) (Tympanic)    Resp (!) 21    SpO2 100%    Breastfeeding No

## 2017-02-01 NOTE — PRE ADMISSION SCREENING
Vicky-operative instructions, medication directives and pain scales reviewed with Ms Smart. All questions the patient had  were answered. Re-assurance about surgical procedure and day of surgery routine given as needed. Ms Smart verbalized understanding of the vicky-op instructions.

## 2017-02-02 NOTE — DISCHARGE SUMMARY
DATE OF ADMISSION:  02/01/2017    DATE OF SURGERY:  02/01/2017    DATE OF DISCHARGE:  02/01/2017    ADMIT DIAGNOSIS:  Poorly-healed vaginal laceration of the labia minora, right   side.    DISCHARGE DIAGNOSIS:  Poorly-healed vaginal laceration of the labia minora,   right side.    PROCEDURE:  Right-sided vaginal laceration repair/labiaplasty.    HOSPITAL COURSE:  Ms. Smart is a 21-year-old -American female, who was   counseled and consented for a revision of her vaginal laceration repair.  Her   surgery was uncomplicated.  The decision was made to use nonabsorbable sutures   for better staying power and that she would follow up with me in 1 week for   suture removal.  The patient was also instructed to have pelvic rest.  She was   counseled during the time of her workup that is very possible that this case may   be unsuccessful and that the area of the labia could end up  anyhow.    She will follow up with me in 1 week.  She was given a prescription for   Percocet and Motrin.  She is told to have normal diet, normal activity and no   physical restrictions.      ARVIND  dd: 02/01/2017 11:58:21 (CST)  td: 02/01/2017 22:47:19 (CST)  Doc ID   #[id number]  Job ID #[id number]    CC:

## 2017-02-11 ENCOUNTER — HOSPITAL ENCOUNTER (EMERGENCY)
Facility: OTHER | Age: 22
Discharge: HOME | End: 2017-02-11
Attending: EMERGENCY MEDICINE
Payer: MEDICAID

## 2017-02-11 VITALS
DIASTOLIC BLOOD PRESSURE: 100 MMHG | HEART RATE: 80 BPM | WEIGHT: 169 LBS | OXYGEN SATURATION: 100 % | TEMPERATURE: 98 F | BODY MASS INDEX: 27.16 KG/M2 | HEIGHT: 66 IN | RESPIRATION RATE: 20 BRPM | SYSTOLIC BLOOD PRESSURE: 155 MMHG

## 2017-02-11 DIAGNOSIS — K04.7 DENTAL ABSCESS: Primary | ICD-10-CM

## 2017-02-11 PROCEDURE — 99283 EMERGENCY DEPT VISIT LOW MDM: CPT

## 2017-02-11 RX ORDER — AMOXICILLIN 500 MG/1
500 CAPSULE ORAL 3 TIMES DAILY
Qty: 21 CAPSULE | Refills: 0 | Status: SHIPPED | OUTPATIENT
Start: 2017-02-11 | End: 2017-02-18

## 2017-02-11 RX ORDER — HYDROCODONE BITARTRATE AND ACETAMINOPHEN 5; 325 MG/1; MG/1
1 TABLET ORAL EVERY 6 HOURS PRN
Qty: 12 TABLET | Refills: 0 | Status: SHIPPED | OUTPATIENT
Start: 2017-02-11 | End: 2018-04-09

## 2017-02-11 NOTE — DISCHARGE INSTRUCTIONS
"Amoxicillin 500 mg 1 by mouth 3 times daily for the next week.  Norco 5/325 one by mouth every 6 hours as needed for pain.  Keep her dental appointment on March 6.    Dental Abscess    An abscess is a pocket of pus at the tip of a tooth root in your jaw bone. It is caused by an infection at the root of the tooth. It can cause pain and swelling of the gum, cheek, or jaw. Pain may spread from the tooth to your ear or the area of your jaw on the same side. If the abscess isnt treated, it appears as a bubble or swelling on the gum near the tooth. The pressure that builds in this swelling is the source of the pain. More serious infections cause your face to swell.  An abscess can be caused by a crack in the tooth, a cavity, a gum infection, or a combination of these. Once the pulp of the tooth is exposed, bacteria can spread down the roots to the tip. If the bacteria are not stopped, they can damage the bone and soft tissue, and an abscess can form.  Home care  Follow these guidelines when caring for yourself at home:  · Avoid hot and cold foods and drinks. Your tooth may be sensitive to changes in temperature. Dont chew on the side of the infected tooth.  · If your tooth is chipped or cracked, or if there is a large open cavity, put oil of cloves directly on the tooth to relieve pain. You can buy oil of cloves at drugstores. Some pharmacies carry an over-the-counter "toothache kit." This contains a paste that you can put on the exposed tooth to make it less sensitive.  · Put a cold pack on your jaw over the sore area to help reduce pain.  · You may use over-the-counter medicine to ease pain, unless another medicine was prescribed. If you have chronic liver or kidney disease, talk with your healthcare provider before using acetaminophen or ibuprofen. Also talk with your provider if youve had a stomach ulcer or GI bleeding.  · An antibiotic will be prescribed. Take it until finished, even if you are feeling better " after a few days.  Follow-up care  Follow up with your dentist or an oral surgeon, or as advised. Once an infection occurs in a tooth, it will continue to be a problem until the infection is drained. This is done through surgery or a root canal. Or you may need to have your tooth pulled.  Call 911  Call 911 if any of these occur:  · Unusual drowsiness  · Headache or stiff neck  · Weakness or fainting  · Difficulty swallowing, breathing, or opening your mouth  · Swollen eyelids  When to seek medical advice  Call your healthcare provider right away if any of these occur:  · Your face becomes more swollen or red  · Pain gets worse or spreads to your neck  · Fever of 100.4º F (38.0º C) or higher, or as directed by your healthcare provider  · Pus drains from the tooth  Date Last Reviewed: 10/1/2016  © 4832-3602 [employer]. [address] [place] [address], [place] [address]. All rights reserved. This information is not intended as a substitute for professional medical care. Always follow your healthcare professional's instructions.

## 2017-02-11 NOTE — ED PROVIDER NOTES
Encounter Date: 2/11/2017       History     Chief Complaint   Patient presents with    Dental Pain     cracked bottom right tooth x 2 weeks, dentist appt scheduled march 6. unable to sleep w/pain     Review of patient's allergies indicates:  No Known Allergies  HPI Comments: Patient is 21-year-old female who reports he had a broken tooth several weeks ago.  She reports increased pain for the past 23 days as well as pain along her right lower jaw, and fever last night.  Reports she's been taken ibuprofen 800 mg 3 times a day without improvement, and that she has a dental appointment in early March.    The history is provided by the patient.     Past Medical History   Diagnosis Date    Asthma     Blood clot associated with vein wall inflammation      Past Medical History Pertinent Negatives   Diagnosis Date Noted    Diabetes mellitus 1/20/2017    Hypertension 1/20/2017     Past Surgical History   Procedure Laterality Date    Cholecystectomy  2010    Vaginal delivery       X 2     Family History   Problem Relation Age of Onset    Hypertension Mother     No Known Problems Brother     No Known Problems Brother     No Known Problems Brother     No Known Problems Father     No Known Problems Sister     No Known Problems Maternal Aunt     No Known Problems Maternal Uncle     No Known Problems Paternal Aunt     No Known Problems Paternal Uncle     No Known Problems Maternal Grandmother     No Known Problems Maternal Grandfather     No Known Problems Paternal Grandmother     No Known Problems Paternal Grandfather     Breast cancer Neg Hx     Colon cancer Neg Hx     Ovarian cancer Neg Hx     Amblyopia Neg Hx     Blindness Neg Hx     Cancer Neg Hx     Cataracts Neg Hx     Diabetes Neg Hx     Glaucoma Neg Hx     Macular degeneration Neg Hx     Retinal detachment Neg Hx     Strabismus Neg Hx     Stroke Neg Hx     Thyroid disease Neg Hx      Social History   Substance Use Topics    Smoking  status: Never Smoker    Smokeless tobacco: None    Alcohol use No     Review of Systems   Constitutional: Positive for fever. Negative for chills.   HENT: Positive for dental problem. Negative for trouble swallowing.    Respiratory: Negative for cough and shortness of breath.        Physical Exam   Initial Vitals   BP Pulse Resp Temp SpO2   02/11/17 0803 02/11/17 0803 02/11/17 0803 02/11/17 0803 02/11/17 0803   158/108 89 18 98.4 °F (36.9 °C) 100 %     Physical Exam    Nursing note and vitals reviewed.  Constitutional: Vital signs are normal. She appears well-developed and well-nourished. She is cooperative.   HENT:   Head: Normocephalic and atraumatic.   Mouth/Throat:       No gross facial swelling   Neck: Phonation normal. Neck supple.   Cardiovascular: Normal rate, regular rhythm and normal heart sounds.   Pulmonary/Chest: Effort normal and breath sounds normal.   Lymphadenopathy:        Head (right side): No submental and no submandibular adenopathy present.        Head (left side): No submental and no submandibular adenopathy present.     She has no cervical adenopathy.   Neurological: She is alert.   Skin: Skin is warm and dry.         ED Course   Procedures  Labs Reviewed - No data to display                            ED Course     Clinical Impression:   The encounter diagnosis was Dental abscess.    Disposition:   Disposition: Discharged  Condition: Stable       Tamica Greenberg MD  02/11/17 0807

## 2017-02-11 NOTE — ED AVS SNAPSHOT
[facility] EMERGENCY DEPARTMENT  [address]  [place] [address]               Mackenzie Smart   2017  8:02 AM   ED    Description:  Female : 1995   Department:  [facility] Emergency Department           Your Care was Coordinated By:     Provider Role From To    Tamica Greenberg MD Attending Provider 17 0826 --      Reason for Visit     Dental Pain           Diagnoses this Visit        Comments    Dental abscess    -  Primary       ED Disposition     ED Disposition Condition Comment    Discharge             To Do List           Follow-up Information     Follow up with Alida Varela MD In 1 week(s).    Specialty:  Family Medicine    Contact information:    [address]  [place] [address]  [phone]          Follow up with Your dentist.    Why:  On March 3, 2017       These Medications        Disp Refills Start End    hydrocodone-acetaminophen 5-325mg (NORCO) 5-325 mg per tablet 12 tablet 0 2017     Take 1 tablet by mouth every 6 (six) hours as needed for Pain. - Oral    Pharmacy: [facility] [address] [place], [place] [address] #: [phone]       amoxicillin (AMOXIL) 500 MG capsule 21 capsule 0 2017    Take 1 capsule (500 mg total) by mouth 3 (three) times daily. - Oral    Pharmacy: [facility] [address] [place], [place] [address] #: [phone]         [facility] On Call     [facility] On Call Nurse Care Line -  Assistance  Registered nurses in the [facility] provide clinical advisement, health education, appointment booking, and other advisory services.  Call for this free service at [phone].             Medications           Message regarding Medications     Verify the changes and/or additions to your medication regime listed below are the same as discussed with your clinician today.  If any of these changes or additions are incorrect, please notify your  "healthcare provider.        START taking these NEW medications        Refills    hydrocodone-acetaminophen 5-325mg (NORCO) 5-325 mg per tablet 0    Sig: Take 1 tablet by mouth every 6 (six) hours as needed for Pain.    Class: Print    Route: Oral    amoxicillin (AMOXIL) 500 MG capsule 0    Sig: Take 1 capsule (500 mg total) by mouth 3 (three) times daily.    Class: Print    Route: Oral      STOP taking these medications     lidocaine HCL 2% (XYLOCAINE) 2 % jelly Apply to affected area daily prn    oxycodone-acetaminophen (PERCOCET) 5-325 mg per tablet Take 1 tablet by mouth every 4 (four) hours as needed for Pain.           Verify that the below list of medications is an accurate representation of the medications you are currently taking.  If none reported, the list may be blank. If incorrect, please contact your healthcare provider. Carry this list with you in case of emergency.           Current Medications     ibuprofen (ADVIL,MOTRIN) 800 MG tablet Take 1 tablet (800 mg total) by mouth every 8 (eight) hours as needed for Pain.    albuterol (VENTOLIN HFA) 90 mcg/actuation inhaler Inhale 2 puffs into the lungs every 6 (six) hours as needed for Wheezing.    amoxicillin (AMOXIL) 500 MG capsule Take 1 capsule (500 mg total) by mouth 3 (three) times daily.    ferrous gluconate (FERGON) 324 MG tablet Take 1 tablet (324 mg total) by mouth daily with breakfast.    hydrocodone-acetaminophen 5-325mg (NORCO) 5-325 mg per tablet Take 1 tablet by mouth every 6 (six) hours as needed for Pain.    medroxyPROGESTERone (DEPO-PROVERA) 150 mg/mL Syrg Inject 1 mL (150 mg total) into the muscle every 3 (three) months. Bring to the office for administration.    medroxyPROGESTERone (DEPO-PROVERA) syringe 150 mg Inject 1 mL (150 mg total) into the muscle every 3 (three) months.           Clinical Reference Information           Your Vitals Were     BP Pulse Temp Resp Height Weight    158/108 89 98.4 °F (36.9 °C) (Oral) 18 5' 6" (1.676 m) " "76.7 kg (169 lb)    SpO2 BMI             100% 27.28 kg/m2         Allergies as of 2/11/2017     No Known Allergies      Immunizations Administered on Date of Encounter - 2/11/2017     None      ED Micro, Lab, POCT     None      ED Imaging Orders     None        Discharge Instructions       Amoxicillin 500 mg 1 by mouth 3 times daily for the next week.  Norco 5/325 one by mouth every 6 hours as needed for pain.  Keep her dental appointment on March 6.    Dental Abscess    An abscess is a pocket of pus at the tip of a tooth root in your jaw bone. It is caused by an infection at the root of the tooth. It can cause pain and swelling of the gum, cheek, or jaw. Pain may spread from the tooth to your ear or the area of your jaw on the same side. If the abscess isnt treated, it appears as a bubble or swelling on the gum near the tooth. The pressure that builds in this swelling is the source of the pain. More serious infections cause your face to swell.  An abscess can be caused by a crack in the tooth, a cavity, a gum infection, or a combination of these. Once the pulp of the tooth is exposed, bacteria can spread down the roots to the tip. If the bacteria are not stopped, they can damage the bone and soft tissue, and an abscess can form.  Home care  Follow these guidelines when caring for yourself at home:  · Avoid hot and cold foods and drinks. Your tooth may be sensitive to changes in temperature. Dont chew on the side of the infected tooth.  · If your tooth is chipped or cracked, or if there is a large open cavity, put oil of cloves directly on the tooth to relieve pain. You can buy oil of cloves at [facility]. Some pharmacies carry an over-the-counter "toothache kit." This contains a paste that you can put on the exposed tooth to make it less sensitive.  · Put a cold pack on your jaw over the sore area to help reduce pain.  · You may use over-the-counter medicine to ease pain, unless another medicine was prescribed. " If you have chronic liver or kidney disease, talk with your healthcare provider before using acetaminophen or ibuprofen. Also talk with your provider if youve had a stomach ulcer or GI bleeding.  · An antibiotic will be prescribed. Take it until finished, even if you are feeling better after a few days.  Follow-up care  Follow up with your dentist or an oral surgeon, or as advised. Once an infection occurs in a tooth, it will continue to be a problem until the infection is drained. This is done through surgery or a root canal. Or you may need to have your tooth pulled.  Call 911  Call 911 if any of these occur:  · Unusual drowsiness  · Headache or stiff neck  · Weakness or fainting  · Difficulty swallowing, breathing, or opening your mouth  · Swollen eyelids  When to seek medical advice  Call your healthcare provider right away if any of these occur:  · Your face becomes more swollen or red  · Pain gets worse or spreads to your neck  · Fever of 100.4º F (38.0º C) or higher, or as directed by your healthcare provider  · Pus drains from the tooth  Date Last Reviewed: 10/1/2016  © 9532-3140 [employer]. [address] [place] [address]. All rights reserved. This information is not intended as a substitute for professional medical care. Always follow your healthcare professional's instructions.          Your Scheduled Appointments     Feb 20, 2017  2:40 PM CST   Annual Exam-[facility] with Rober Sy MD   [facility] ([facility])    [address] [place] [id number]   [phone]              [facility] Sign-Up     Activating your [facility] account is as easy as 1-2-3!     1) Visit [email or web address], select Sign Up Now, enter this activation code and your date of birth, then select Next.  [email or web address]  Expires: 3/6/2017  1:29 PM      2) Create a username and password to use when you visit [facility] in the future and select a security question in case you lose your password and select  Next.    3) Enter your e-mail address and click Sign Up!    Additional Information  If you have questions, please e-mail [email or web address] or call [phone] to talk to our [facility] staff. Remember, [facility] is NOT to be used for urgent needs. For medical emergencies, dial 911.          [facility] Emergency Department complies with applicable Federal civil rights laws and does not discriminate on the basis of race, color, national origin, age, disability, or sex.        Language Assistance Services     ATTENTION: Language assistance services are available, free of charge. Please call [phone].      ATENCIÓN: Si habla español, tiene a gonzalez disposición servicios gratuitos de asistencia lingüística. Llame al [phone].     CHÚ Ý: N?u b?n nói Ti?ng Vi?t, có các d?ch v? h? tr? ngôn ng? mi?n phí dành cho b?n. G?i s? [phone].

## 2017-03-30 ENCOUNTER — HOSPITAL ENCOUNTER (EMERGENCY)
Facility: OTHER | Age: 22
Discharge: HOME | End: 2017-03-30
Attending: EMERGENCY MEDICINE
Payer: MEDICAID

## 2017-03-30 VITALS
TEMPERATURE: 98 F | RESPIRATION RATE: 17 BRPM | BODY MASS INDEX: 27.32 KG/M2 | HEIGHT: 66 IN | HEART RATE: 79 BPM | WEIGHT: 170 LBS | SYSTOLIC BLOOD PRESSURE: 137 MMHG | DIASTOLIC BLOOD PRESSURE: 89 MMHG | OXYGEN SATURATION: 99 %

## 2017-03-30 DIAGNOSIS — J32.9 CHRONIC SINUSITIS, UNSPECIFIED LOCATION: ICD-10-CM

## 2017-03-30 DIAGNOSIS — R51.9 NONINTRACTABLE EPISODIC HEADACHE, UNSPECIFIED HEADACHE TYPE: Primary | ICD-10-CM

## 2017-03-30 LAB
B-HCG UR QL: NEGATIVE
CTP QC/QA: YES

## 2017-03-30 PROCEDURE — 63600175 PHARM REV CODE 636 W HCPCS: Performed by: EMERGENCY MEDICINE

## 2017-03-30 PROCEDURE — 81025 URINE PREGNANCY TEST: CPT | Performed by: EMERGENCY MEDICINE

## 2017-03-30 PROCEDURE — 96372 THER/PROPH/DIAG INJ SC/IM: CPT

## 2017-03-30 PROCEDURE — 99283 EMERGENCY DEPT VISIT LOW MDM: CPT

## 2017-03-30 PROCEDURE — 99284 EMERGENCY DEPT VISIT MOD MDM: CPT | Mod: 25

## 2017-03-30 PROCEDURE — 81025 URINE PREGNANCY TEST: CPT

## 2017-03-30 RX ORDER — LORATADINE 10 MG/1
10 TABLET ORAL DAILY
Qty: 30 TABLET | Refills: 0 | Status: SHIPPED | OUTPATIENT
Start: 2017-03-30 | End: 2018-04-13

## 2017-03-30 RX ORDER — KETOROLAC TROMETHAMINE 30 MG/ML
60 INJECTION, SOLUTION INTRAMUSCULAR; INTRAVENOUS
Status: COMPLETED | OUTPATIENT
Start: 2017-03-30 | End: 2017-03-30

## 2017-03-30 RX ORDER — IBUPROFEN 800 MG/1
800 TABLET ORAL EVERY 8 HOURS PRN
Qty: 30 TABLET | Refills: 0 | Status: SHIPPED | OUTPATIENT
Start: 2017-03-30 | End: 2018-04-09

## 2017-03-30 RX ORDER — FLUTICASONE PROPIONATE 50 MCG
2 SPRAY, SUSPENSION (ML) NASAL DAILY
Qty: 16 G | Refills: 0 | Status: SHIPPED | OUTPATIENT
Start: 2017-03-30 | End: 2018-04-09

## 2017-03-30 RX ORDER — BUTALBITAL, ACETAMINOPHEN AND CAFFEINE 50; 325; 40 MG/1; MG/1; MG/1
1 TABLET ORAL EVERY 4 HOURS PRN
Qty: 15 TABLET | Refills: 0 | Status: SHIPPED | OUTPATIENT
Start: 2017-03-30 | End: 2017-04-29

## 2017-03-30 RX ADMIN — KETOROLAC TROMETHAMINE 60 MG: 30 INJECTION, SOLUTION INTRAMUSCULAR at 07:03

## 2017-03-30 NOTE — ED PROVIDER NOTES
Encounter Date: 3/30/2017       History     Chief Complaint   Patient presents with    Headache     Review of patient's allergies indicates:  No Known Allergies  Patient is a 22 y.o. female presenting with the following complaint: headaches.   Headache    This is a chronic problem. Episode onset: two days ago. The problem occurs daily. The problem has been waxing and waning. The pain is located in the frontal region. The pain does not radiate. The pain quality is similar to prior headaches. The quality of the pain is described as throbbing. The pain is at a severity of 9/10. Associated symptoms include sinus pressure. Pertinent negatives include no blurred vision, coughing, dizziness, eye watering, fever, loss of balance, nausea, phonophobia, photophobia, rhinorrhea, vomiting or weakness.     Past Medical History:   Diagnosis Date    Asthma     Blood clot associated with vein wall inflammation      Past Surgical History:   Procedure Laterality Date    CHOLECYSTECTOMY  2010    VAGINAL DELIVERY      X 2     Family History   Problem Relation Age of Onset    Hypertension Mother     No Known Problems Brother     No Known Problems Brother     No Known Problems Brother     No Known Problems Father     No Known Problems Sister     No Known Problems Maternal Aunt     No Known Problems Maternal Uncle     No Known Problems Paternal Aunt     No Known Problems Paternal Uncle     No Known Problems Maternal Grandmother     No Known Problems Maternal Grandfather     No Known Problems Paternal Grandmother     No Known Problems Paternal Grandfather     Breast cancer Neg Hx     Colon cancer Neg Hx     Ovarian cancer Neg Hx     Amblyopia Neg Hx     Blindness Neg Hx     Cancer Neg Hx     Cataracts Neg Hx     Diabetes Neg Hx     Glaucoma Neg Hx     Macular degeneration Neg Hx     Retinal detachment Neg Hx     Strabismus Neg Hx     Stroke Neg Hx     Thyroid disease Neg Hx      Social History   Substance  Use Topics    Smoking status: Never Smoker    Smokeless tobacco: None    Alcohol use No     Review of Systems   Constitutional: Negative for chills and fever.   HENT: Positive for congestion and sinus pressure. Negative for rhinorrhea.    Eyes: Negative.  Negative for blurred vision and photophobia.   Respiratory: Negative for cough, shortness of breath and stridor.    Cardiovascular: Negative for chest pain and palpitations.   Gastrointestinal: Negative for nausea and vomiting.   Genitourinary: Negative for dysuria and hematuria.   Musculoskeletal: Negative.    Skin: Negative.    Neurological: Positive for headaches. Negative for dizziness, weakness and loss of balance.   Psychiatric/Behavioral: Negative.    All other systems reviewed and are negative.      Physical Exam   Initial Vitals   BP Pulse Resp Temp SpO2   03/30/17 0555 03/30/17 0555 03/30/17 0555 03/30/17 0555 03/30/17 0555   137/89 79 17 98.2 °F (36.8 °C) 99 %     Physical Exam    Nursing note and vitals reviewed.  Constitutional: She appears well-developed and well-nourished. She is not diaphoretic. No distress.   HENT:   Head: Normocephalic and atraumatic.   Nose: Mucosal edema present.   Mouth/Throat: Uvula is midline, oropharynx is clear and moist and mucous membranes are normal. No uvula swelling. No oropharyngeal exudate.   Eyes: Conjunctivae and EOM are normal. Pupils are equal, round, and reactive to light.   Neck: Normal range of motion. Neck supple.   Cardiovascular: Normal rate, regular rhythm and intact distal pulses.   No murmur heard.  Pulmonary/Chest: Breath sounds normal. No stridor. No respiratory distress.   Abdominal: Soft. Bowel sounds are normal. There is no tenderness.   Musculoskeletal: Normal range of motion. She exhibits no edema or tenderness.   Neurological: She is alert and oriented to person, place, and time. She has normal strength. No cranial nerve deficit or sensory deficit.   Skin: Skin is warm and dry. No erythema. No  "pallor.   Psychiatric: She has a normal mood and affect.         ED Course   Procedures  Labs Reviewed   POCT URINE PREGNANCY             Medical Decision Making:   Initial Assessment:   There is no fever, weight loss, known history of cancer, immunocompromised state, confusion, impaired alertness or consciousness, focal neurologic symptoms/signs, meningismus, seizures), sudden onset headache ("thunderclap"), head trauma, illicit drug use, or toxic exposure    Clinical Tests:   Lab Tests: Ordered and Reviewed                   ED Course     Labs Reviewed  Admission on 03/30/2017   Component Date Value Ref Range Status    POC Preg Test, Ur 03/30/2017 Negative  Negative Final     Acceptable 03/30/2017 Yes   Final        Imaging Reviewed    Imaging Results     None          Medications given in ED    Medications   ketorolac injection 60 mg (not administered)       Discharge Medications     Medication List with Changes/Refills   New Medications    BUTALBITAL-ACETAMINOPHEN-CAFFEINE -40 MG (FIORICET, ESGIC) -40 MG PER TABLET    Take 1 tablet by mouth every 4 (four) hours as needed for Pain.    FLUTICASONE (FLONASE) 50 MCG/ACTUATION NASAL SPRAY    2 sprays by Each Nare route once daily.    LORATADINE (CLARITIN) 10 MG TABLET    Take 1 tablet (10 mg total) by mouth once daily.   Current Medications    ALBUTEROL (VENTOLIN HFA) 90 MCG/ACTUATION INHALER    Inhale 2 puffs into the lungs every 6 (six) hours as needed for Wheezing.    FERROUS GLUCONATE (FERGON) 324 MG TABLET    Take 1 tablet (324 mg total) by mouth daily with breakfast.    HYDROCODONE-ACETAMINOPHEN 5-325MG (NORCO) 5-325 MG PER TABLET    Take 1 tablet by mouth every 6 (six) hours as needed for Pain.    MEDROXYPROGESTERONE (DEPO-PROVERA) 150 MG/ML SYRG    Inject 1 mL (150 mg total) into the muscle every 3 (three) months. Bring to the office for administration.   Changed and/or Refilled Medications    Modified Medication Previous " Medication    IBUPROFEN (ADVIL,MOTRIN) 800 MG TABLET ibuprofen (ADVIL,MOTRIN) 800 MG tablet       Take 1 tablet (800 mg total) by mouth every 8 (eight) hours as needed for Pain.    Take 1 tablet (800 mg total) by mouth every 8 (eight) hours as needed for Pain.             Patient discharged to home in stable condition with instructions to:   1. Please take all meds as prescribed.  2. Follow-up with your primary care doctor   3. Return precautions discussed and patient and/or family/caretaker understands to return to the emergency room for any concerns including worsening of your current symptoms, fever, chills, night sweats, worsening pain, chest pain, shortness of breath, nausea, vomiting, diarrhea, bleeding, headache, difficulty talking, visual disturbances, weakness, numbness or any other acute concerns    Clinical Impression:   The primary encounter diagnosis was Nonintractable episodic headache, unspecified headache type. A diagnosis of Chronic sinusitis, unspecified location was also pertinent to this visit.          Efrain Bates MD  03/30/17 1400

## 2017-03-30 NOTE — DISCHARGE INSTRUCTIONS
Sinus Headaches     When using nasal spray, keep your chin down and angle the spray away from center.     Sinus headaches can cause a gnawing pain behind the nose and eyes. The pain most often gets worse in the afternoon and evening. You may also run a fever. Sinus headaches are caused by colds or allergies that make the nasal passages inflamed or infected.  To help prevent sinus headaches:  · Treat colds promptly to keep mucus from backing up.  · Avoid things that trigger sinus problems, such as pollens, dust, smoke, fumes, and strong odors.  · Take allergy medicines as directed by your healthcare provider.  To relieve the pain:  · Keep your sinuses open and free of mucus. Try over-the-counter sinus rinse products.  · Use a nasal decongestant as directed to reduce the inflammation.  · Drink fluids to keep the mucus thinner. This helps it drain more easily. You can also use a humidifier.  · Apply hot packs to the area around your sinuses. Use a hot water bottle.  · See your healthcare provider if your sinus headache lasts more than 2 weeks. You may need medicine for a sinus infection or an exam to check for other headache conditions, like migraines.  Date Last Reviewed: 10/1/2016  © 0045-5246 [employer]. [address] [place] [address], [place] [address]. All rights reserved. This information is not intended as a substitute for professional medical care. Always follow your healthcare professional's instructions.
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
## 2017-03-30 NOTE — ED AVS SNAPSHOT
[facility] EMERGENCY DEPARTMENT  [address] [facility] [address]  [place] [address]               Mackenzie Smart   3/30/2017  5:57 AM   ED    Description:  Female : 1995   Department:  [facility] Emergency Department           Your Care was Coordinated By:     Provider Role From To    Efrain Bates MD Attending Provider 17 0602 --      Reason for Visit     Headache           Diagnoses this Visit        Comments    Nonintractable episodic headache, unspecified headache type    -  Primary     Chronic sinusitis, unspecified location           ED Disposition     ED Disposition Condition Comment    Discharge  Patient discharged to home in stable condition.              To Do List           Follow-up Information     Follow up with Alida Varela MD In 1 week.    Specialty:  Family Medicine    Why:  for re-evaluation of today's complaint, and ongoing care    Contact information:    [address]  [place] [id number] [phone]         These Medications        Disp Refills Start End    ibuprofen (ADVIL,MOTRIN) 800 MG tablet 30 tablet 0 3/30/2017     Take 1 tablet (800 mg total) by mouth every 8 (eight) hours as needed for Pain. - Oral    Pharmacy: [facility] [address] [place] [address] [place] -  [facility] #: [phone]       butalbital-acetaminophen-caffeine -40 mg (FIORICET, ESGIC) -40 mg per tablet 15 tablet 0 3/30/2017 2017    Take 1 tablet by mouth every 4 (four) hours as needed for Pain. - Oral    Pharmacy: [facility] [address] [place] [address] [place] -  [facility] #: [phone]       fluticasone (FLONASE) 50 mcg/actuation nasal spray 16 g 0 3/30/2017     2 sprays by Each Nare route once daily. - Each Nare    Pharmacy: [facility] [address] [place] [address] [place] -  [facility] #: [phone]       loratadine (CLARITIN) 10 mg tablet 30 tablet 0 3/30/2017 3/30/2018    Take 1 tablet (10 mg total) by  mouth once daily. - Oral    Pharmacy: [facility] [id number]  [address] [facility] #: [phone]         [facility]     [facility] On Call Nurse Care Line -  Assistance  Unless otherwise directed by your provider, please contact [facility], our nurse care line that is available for  assistance.     Registered nurses in the [facility] provide: appointment scheduling, clinical advisement, health education, and other advisory services.  Call: [phone] (toll free)               Medications           Message regarding Medications     Verify the changes and/or additions to your medication regime listed below are the same as discussed with your clinician today.  If any of these changes or additions are incorrect, please notify your healthcare provider.        START taking these NEW medications        Refills    butalbital-acetaminophen-caffeine -40 mg (FIORICET, ESGIC) -40 mg per tablet 0    Sig: Take 1 tablet by mouth every 4 (four) hours as needed for Pain.    Class: Normal    Route: Oral    fluticasone (FLONASE) 50 mcg/actuation nasal spray 0    Si sprays by Each Nare route once daily.    Class: Normal    Route: Each Nare    loratadine (CLARITIN) 10 mg tablet 0    Sig: Take 1 tablet (10 mg total) by mouth once daily.    Class: Normal    Route: Oral      These medications were administered today        Dose Freq    ketorolac injection 60 mg 60 mg ED 1 Time    Sig: Inject 2 mLs (60 mg total) into the muscle ED 1 Time.    Class: Normal    Route: Intramuscular           Verify that the below list of medications is an accurate representation of the medications you are currently taking.  If none reported, the list may be blank. If incorrect, please contact your healthcare provider. Carry this list with you in case of emergency.           Current Medications     albuterol (VENTOLIN HFA) 90 mcg/actuation inhaler Inhale 2 puffs into the  "lungs every 6 (six) hours as needed for Wheezing.    butalbital-acetaminophen-caffeine -40 mg (FIORICET, ESGIC) -40 mg per tablet Take 1 tablet by mouth every 4 (four) hours as needed for Pain.    ferrous gluconate (FERGON) 324 MG tablet Take 1 tablet (324 mg total) by mouth daily with breakfast.    fluticasone (FLONASE) 50 mcg/actuation nasal spray 2 sprays by Each Nare route once daily.    hydrocodone-acetaminophen 5-325mg (NORCO) 5-325 mg per tablet Take 1 tablet by mouth every 6 (six) hours as needed for Pain.    ibuprofen (ADVIL,MOTRIN) 800 MG tablet Take 1 tablet (800 mg total) by mouth every 8 (eight) hours as needed for Pain.    ketorolac injection 60 mg Inject 2 mLs (60 mg total) into the muscle ED 1 Time.    loratadine (CLARITIN) 10 mg tablet Take 1 tablet (10 mg total) by mouth once daily.    medroxyPROGESTERone (DEPO-PROVERA) 150 mg/mL Syrg Inject 1 mL (150 mg total) into the muscle every 3 (three) months. Bring to the office for administration.    medroxyPROGESTERone (DEPO-PROVERA) syringe 150 mg Inject 1 mL (150 mg total) into the muscle every 3 (three) months.           Clinical Reference Information           Your Vitals Were     BP Pulse Temp Resp Height Weight    137/89 79 98.2 °F (36.8 °C) 17 5' 6" (1.676 m) 77.1 kg (170 lb)    SpO2 BMI             99% 27.44 kg/m2         Allergies as of 3/30/2017     No Known Allergies      Immunizations Administered on Date of Encounter - 3/30/2017     None      ED Micro, Lab, POCT     Start Ordered       Status Ordering Provider    03/30/17 0611 03/30/17 0610  POCT urine pregnancy  Once      Final result       ED Imaging Orders     None        Discharge Instructions         Sinus Headaches     When using nasal spray, keep your chin down and angle the spray away from center.     Sinus headaches can cause a gnawing pain behind the nose and eyes. The pain most often gets worse in the afternoon and evening. You may also run a fever. Sinus headaches are " caused by colds or allergies that make the nasal passages inflamed or infected.  To help prevent sinus headaches:  · Treat colds promptly to keep mucus from backing up.  · Avoid things that trigger sinus problems, such as pollens, dust, smoke, fumes, and strong odors.  · Take allergy medicines as directed by your healthcare provider.  To relieve the pain:  · Keep your sinuses open and free of mucus. Try over-the-counter sinus rinse products.  · Use a nasal decongestant as directed to reduce the inflammation.  · Drink fluids to keep the mucus thinner. This helps it drain more easily. You can also use a humidifier.  · Apply hot packs to the area around your sinuses. Use a hot water bottle.  · See your healthcare provider if your sinus headache lasts more than 2 weeks. You may need medicine for a sinus infection or an exam to check for other headache conditions, like migraines.  Date Last Reviewed: 10/1/2016  © 8927-1589 [employer]. [address] [place] [address]. All rights reserved. This information is not intended as a substitute for professional medical care. Always follow your healthcare professional's instructions.          Your Scheduled Appointments     Apr 03, 2017 10:05 AM CDT   Annual Exam-[facility] with Rober Sy MD   [facility] ([facility])    [address] [place] [id number] [phone]              [facility] Sign-Up     Activating your [facility] account is as easy as 1-2-3!     1) Visit [email or web address], select Sign Up Now, enter this activation code and your date of birth, then select Next.  [email or web address]  Expires: 5/14/2017  6:55 AM      2) Create a username and password to use when you visit [facility] in the future and select a security question in case you lose your password and select Next.    3) Enter your e-mail address and click Sign Up!    Additional Information  If you have questions, please e-mail [email or web address] or call [phone] to talk  to our [facility] staff. Remember, [facility] is NOT to be used for urgent needs. For medical emergencies, dial 911.          [facility] Department complies with applicable Federal civil rights laws and does not discriminate on the basis of race, color, national origin, age, disability, or sex.        Language Assistance Services     ATTENTION: Language assistance services are available, free of charge. Please call [phone].      ATENCIÓN: Si habla español, tiene a gonzalez disposición servicios gratuitos de asistencia lingüística. Llame al [phone].     CHÚ Ý: N?u b?n nói Ti?ng Vi?t, có các d?ch v? h? tr? ngôn ng? mi?n phí dành cho b?n. G?i s? [phone].

## 2017-05-02 PROBLEM — N89.8 VAGINAL LACERATION, OLD: Status: RESOLVED | Noted: 2017-02-01 | Resolved: 2017-05-02

## 2017-05-11 PROBLEM — R87.610 ASCUS OF CERVIX WITH NEGATIVE HIGH RISK HPV: Status: ACTIVE | Noted: 2017-05-11

## 2017-12-21 ENCOUNTER — HOSPITAL ENCOUNTER (EMERGENCY)
Facility: OTHER | Age: 22
Discharge: HOME | End: 2017-12-21
Attending: INTERNAL MEDICINE
Payer: MEDICAID

## 2017-12-21 VITALS
HEIGHT: 66 IN | RESPIRATION RATE: 16 BRPM | DIASTOLIC BLOOD PRESSURE: 89 MMHG | SYSTOLIC BLOOD PRESSURE: 128 MMHG | OXYGEN SATURATION: 98 % | TEMPERATURE: 99 F | HEART RATE: 98 BPM | WEIGHT: 134 LBS | BODY MASS INDEX: 21.53 KG/M2

## 2017-12-21 DIAGNOSIS — R09.89 CHEST CONGESTION: ICD-10-CM

## 2017-12-21 DIAGNOSIS — J40 BRONCHITIS: Primary | ICD-10-CM

## 2017-12-21 LAB
B-HCG UR QL: NEGATIVE
CTP QC/QA: YES

## 2017-12-21 PROCEDURE — 81025 URINE PREGNANCY TEST: CPT | Performed by: NURSE PRACTITIONER

## 2017-12-21 PROCEDURE — 99284 EMERGENCY DEPT VISIT MOD MDM: CPT

## 2017-12-21 RX ORDER — PROMETHAZINE HYDROCHLORIDE AND DEXTROMETHORPHAN HYDROBROMIDE 6.25; 15 MG/5ML; MG/5ML
5 SYRUP ORAL EVERY 6 HOURS PRN
Qty: 120 ML | Refills: 0 | Status: SHIPPED | OUTPATIENT
Start: 2017-12-21 | End: 2018-04-09

## 2017-12-21 RX ORDER — AZITHROMYCIN 250 MG/1
250 TABLET, FILM COATED ORAL DAILY
Qty: 6 TABLET | Refills: 0 | Status: SHIPPED | OUTPATIENT
Start: 2017-12-21 | End: 2018-04-09

## 2017-12-21 NOTE — ED PROVIDER NOTES
Encounter Date: 12/21/2017       History     Chief Complaint   Patient presents with    Cough     cough x 1 week w no improvement     The history is provided by the patient. No  was used.   Cough   This is a new problem. The current episode started several weeks ago. The problem occurs every few minutes. The problem has been unchanged. The cough is non-productive. There has been no fever. Pertinent negatives include no chest pain, no chills, no sweats, no weight loss, no ear congestion, no ear pain, no headaches, no rhinorrhea, no sore throat, no myalgias, no shortness of breath, no wheezing and no eye redness. She has tried nothing for the symptoms. She is not a smoker. Her past medical history is significant for asthma. Her past medical history does not include bronchitis, pneumonia, bronchiectasis, COPD or emphysema.     Review of patient's allergies indicates:  No Known Allergies  Past Medical History:   Diagnosis Date    Asthma     Blood clot associated with vein wall inflammation      Past Surgical History:   Procedure Laterality Date    CHOLECYSTECTOMY  2010    VAGINAL DELIVERY      X 2     Family History   Problem Relation Age of Onset    Hypertension Mother     No Known Problems Brother     No Known Problems Brother     No Known Problems Brother     No Known Problems Father     No Known Problems Sister     No Known Problems Maternal Aunt     No Known Problems Maternal Uncle     No Known Problems Paternal Aunt     No Known Problems Paternal Uncle     No Known Problems Maternal Grandmother     No Known Problems Maternal Grandfather     No Known Problems Paternal Grandmother     No Known Problems Paternal Grandfather     Breast cancer Neg Hx     Colon cancer Neg Hx     Ovarian cancer Neg Hx     Amblyopia Neg Hx     Blindness Neg Hx     Cancer Neg Hx     Cataracts Neg Hx     Diabetes Neg Hx     Glaucoma Neg Hx     Macular degeneration Neg Hx     Retinal  detachment Neg Hx     Strabismus Neg Hx     Stroke Neg Hx     Thyroid disease Neg Hx      Social History   Substance Use Topics    Smoking status: Never Smoker    Smokeless tobacco: Not on file    Alcohol use No     Review of Systems   Constitutional: Negative.  Negative for appetite change, chills, fever and weight loss.   HENT: Negative.  Negative for congestion, dental problem, ear discharge, ear pain, hearing loss, mouth sores, rhinorrhea, sore throat and trouble swallowing.    Eyes: Negative.  Negative for pain, discharge and redness.   Respiratory: Positive for cough. Negative for shortness of breath and wheezing.    Cardiovascular: Negative.  Negative for chest pain.   Gastrointestinal: Negative.  Negative for abdominal distention, abdominal pain, constipation, diarrhea, nausea, rectal pain and vomiting.   Endocrine: Negative.    Genitourinary: Negative.  Negative for dyspareunia, dysuria, hematuria, vaginal bleeding, vaginal discharge and vaginal pain.   Musculoskeletal: Negative.  Negative for back pain, myalgias and neck pain.   Skin: Negative.  Negative for rash.   Allergic/Immunologic: Negative.    Neurological: Negative.  Negative for facial asymmetry, speech difficulty, weakness, light-headedness and headaches.   Hematological: Negative.  Does not bruise/bleed easily.   Psychiatric/Behavioral: Negative.  Negative for agitation, dysphoric mood and sleep disturbance.   All other systems reviewed and are negative.      Physical Exam     Initial Vitals [12/21/17 1539]   BP Pulse Resp Temp SpO2   (!) 131/95 (!) 111 20 99.8 °F (37.7 °C) 98 %      MAP       107         Physical Exam    Nursing note and vitals reviewed.  Constitutional: She appears well-developed and well-nourished. She is not diaphoretic.  Non-toxic appearance. She does not appear ill. No distress.   HENT:   Head: Normocephalic and atraumatic.   Nose: Nose normal.   Mouth/Throat: Oropharynx is clear and moist. No oropharyngeal exudate.    Eyes: Conjunctivae are normal. Right eye exhibits no discharge. Left eye exhibits no discharge.   Neck: Normal range of motion. Neck supple.   Cardiovascular: Normal rate, regular rhythm, normal heart sounds and intact distal pulses. Exam reveals no gallop and no friction rub.    No murmur heard.  Pulmonary/Chest: Breath sounds normal. No respiratory distress. She has no wheezes. She has no rhonchi. She has no rales. She exhibits no tenderness.   Musculoskeletal: Normal range of motion.   Lymphadenopathy:     She has no cervical adenopathy.   Neurological: She is alert and oriented to person, place, and time.   Skin: Skin is warm and dry. Capillary refill takes less than 2 seconds. No rash noted.   Psychiatric: She has a normal mood and affect. Her behavior is normal. Judgment and thought content normal.       Imaging Results          X-Ray Chest PA And Lateral (Final result)  Result time 12/21/17 16:32:04    Final result by Yocasta Rodriguez MD (12/21/17 16:32:04)                 Impression:        Normal radiographic appearance of the chest.      Electronically signed by: YOCASTA RODRIGUEZ MD, MD  Date:     12/21/17  Time:    16:32              Narrative:    COMPARISON: None    FINDINGS: PA and lateral views of the chest.    Pulmonary vasculature and hilar regions are within normal limits.  The bilateral lungs are well expanded and clear.  No pleural effusion or pneumothorax.  The heart and mediastinal contours are within normal limits for age.  Included osseous structures appear intact.                              ED Course   Procedures  Labs Reviewed   POCT URINE PREGNANCY             Medical Decision Making:   Initial Assessment:   Bronchitis, chest congestion  Differential Diagnosis:   Pneumonia, wheeze  Clinical Tests:   Radiological Study: Ordered and Reviewed  ED Management:  Repeat pulse is 100 bpm.  Patient will be discharged home on azithromycin pack and Promethazine DM.  Patient instructed to take  over-the-counter Tylenol and/or Motrin as needed for pain/fever, Mucinex as needed for cough congestion loosen secretions, drink plenty of fluids to prevent dehydration and to loosen secretions, follow up with primary care provider tomorrow and return to the ER as needed if symptoms worsen or fail to improve.  Patient verbalized an understanding of discharge instructions and treatment plan.                   ED Course      Clinical Impression:   The primary encounter diagnosis was Bronchitis. A diagnosis of Chest congestion was also pertinent to this visit.                           Toussaint Battley III, FNP  12/21/17 6370

## 2018-03-25 ENCOUNTER — HOSPITAL ENCOUNTER (EMERGENCY)
Facility: OTHER | Age: 23
Discharge: HOME | End: 2018-03-25
Attending: EMERGENCY MEDICINE
Payer: MEDICAID

## 2018-03-25 VITALS
DIASTOLIC BLOOD PRESSURE: 90 MMHG | TEMPERATURE: 98 F | OXYGEN SATURATION: 100 % | SYSTOLIC BLOOD PRESSURE: 139 MMHG | BODY MASS INDEX: 20.09 KG/M2 | RESPIRATION RATE: 18 BRPM | WEIGHT: 125 LBS | HEART RATE: 57 BPM | HEIGHT: 66 IN

## 2018-03-25 DIAGNOSIS — K52.9 GASTROENTERITIS: Primary | ICD-10-CM

## 2018-03-25 LAB
ALBUMIN SERPL-MCNC: 4.3 G/DL (ref 3.3–5.5)
ALBUMIN SERPL-MCNC: 4.6 G/DL (ref 3.3–5.5)
ALP SERPL-CCNC: 72 U/L (ref 42–141)
ALP SERPL-CCNC: 73 U/L (ref 42–141)
B-HCG UR QL: NEGATIVE
BILIRUB SERPL-MCNC: 0.9 MG/DL (ref 0.2–1.6)
BILIRUB SERPL-MCNC: 0.9 MG/DL (ref 0.2–1.6)
BILIRUBIN, POC UA: NEGATIVE
BLOOD, POC UA: ABNORMAL
BUN SERPL-MCNC: 7 MG/DL (ref 7–22)
CALCIUM SERPL-MCNC: 9.7 MG/DL (ref 8–10.3)
CHLORIDE SERPL-SCNC: 104 MMOL/L (ref 98–108)
CLARITY, POC UA: CLEAR
COLOR, POC UA: YELLOW
CREAT SERPL-MCNC: 0.8 MG/DL (ref 0.6–1.2)
CTP QC/QA: YES
GLUCOSE SERPL-MCNC: 125 MG/DL (ref 73–118)
GLUCOSE, POC UA: NEGATIVE
KETONES, POC UA: ABNORMAL
LEUKOCYTE EST, POC UA: NEGATIVE
NITRITE, POC UA: NEGATIVE
PH UR STRIP: 8.5 [PH]
POC ALT (SGPT): 30 U/L (ref 10–47)
POC ALT (SGPT): 30 U/L (ref 10–47)
POC AMYLASE: 59 U/L (ref 14–97)
POC AST (SGOT): 34 U/L (ref 11–38)
POC AST (SGOT): 36 U/L (ref 11–38)
POC GGT: 11 U/L (ref 5–65)
POC TCO2: 25 MMOL/L (ref 18–33)
POTASSIUM BLD-SCNC: 3.8 MMOL/L (ref 3.6–5.1)
PROTEIN, POC UA: ABNORMAL
PROTEIN, POC: 7.5 G/DL (ref 6.4–8.1)
PROTEIN, POC: 7.8 G/DL (ref 6.4–8.1)
SODIUM BLD-SCNC: 141 MMOL/L (ref 128–145)
SPECIFIC GRAVITY, POC UA: 1.01
UROBILINOGEN, POC UA: 0.2 E.U./DL

## 2018-03-25 PROCEDURE — 81025 URINE PREGNANCY TEST: CPT | Performed by: EMERGENCY MEDICINE

## 2018-03-25 PROCEDURE — 99284 EMERGENCY DEPT VISIT MOD MDM: CPT | Mod: 25

## 2018-03-25 PROCEDURE — 81003 URINALYSIS AUTO W/O SCOPE: CPT

## 2018-03-25 PROCEDURE — 80053 COMPREHEN METABOLIC PANEL: CPT

## 2018-03-25 PROCEDURE — 25000003 PHARM REV CODE 250: Performed by: EMERGENCY MEDICINE

## 2018-03-25 PROCEDURE — 96374 THER/PROPH/DIAG INJ IV PUSH: CPT

## 2018-03-25 PROCEDURE — 96375 TX/PRO/DX INJ NEW DRUG ADDON: CPT

## 2018-03-25 PROCEDURE — 63600175 PHARM REV CODE 636 W HCPCS: Performed by: EMERGENCY MEDICINE

## 2018-03-25 PROCEDURE — 82150 ASSAY OF AMYLASE: CPT

## 2018-03-25 PROCEDURE — 85025 COMPLETE CBC W/AUTO DIFF WBC: CPT

## 2018-03-25 RX ORDER — ONDANSETRON 4 MG/1
4 TABLET, ORALLY DISINTEGRATING ORAL EVERY 8 HOURS PRN
Qty: 14 TABLET | Refills: 1 | Status: SHIPPED | OUTPATIENT
Start: 2018-03-25 | End: 2018-04-09

## 2018-03-25 RX ORDER — KETOROLAC TROMETHAMINE 30 MG/ML
30 INJECTION, SOLUTION INTRAMUSCULAR; INTRAVENOUS
Status: COMPLETED | OUTPATIENT
Start: 2018-03-25 | End: 2018-03-25

## 2018-03-25 RX ORDER — ONDANSETRON 2 MG/ML
4 INJECTION INTRAMUSCULAR; INTRAVENOUS
Status: COMPLETED | OUTPATIENT
Start: 2018-03-25 | End: 2018-03-25

## 2018-03-25 RX ORDER — DICYCLOMINE HYDROCHLORIDE 20 MG/1
20 TABLET ORAL 2 TIMES DAILY
Qty: 30 TABLET | Refills: 0 | Status: SHIPPED | OUTPATIENT
Start: 2018-03-25 | End: 2018-04-09

## 2018-03-25 RX ADMIN — SODIUM CHLORIDE 1000 ML: 0.9 INJECTION, SOLUTION INTRAVENOUS at 05:03

## 2018-03-25 RX ADMIN — ONDANSETRON 4 MG: 2 INJECTION, SOLUTION INTRAMUSCULAR; INTRAVENOUS at 05:03

## 2018-03-25 RX ADMIN — KETOROLAC TROMETHAMINE 30 MG: 30 INJECTION, SOLUTION INTRAMUSCULAR at 05:03

## 2018-03-25 NOTE — ED PROVIDER NOTES
"Encounter Date: 3/25/2018       History     Chief Complaint   Patient presents with    vomitting     pt report N/V/D that started this AM with abdominal cramping     Chief complaint: Vomiting  23-year-old complains of multiple episodes of nausea and vomiting that began this morning.  Patient said that she had a "migraine" this morning.  She began vomiting and has not been able to stop.  She said the headache has resolved.  She also has diarrhea and diffuse cramps to her abdomen.  She reports generalized weakness.  She says that she is hungry and thirsty but is unable to keep anything down.  She denies bad food exposures or sick contacts.  Patient's pain is moderate.  There are no aggravating or alleviating factors for her symptoms.      The history is provided by the patient.     Review of patient's allergies indicates:  No Known Allergies  Past Medical History:   Diagnosis Date    Asthma     Blood clot associated with vein wall inflammation      Past Surgical History:   Procedure Laterality Date    CHOLECYSTECTOMY  2010    VAGINAL DELIVERY      X 2     Family History   Problem Relation Age of Onset    Hypertension Mother     No Known Problems Brother     No Known Problems Brother     No Known Problems Brother     No Known Problems Father     No Known Problems Sister     No Known Problems Maternal Aunt     No Known Problems Maternal Uncle     No Known Problems Paternal Aunt     No Known Problems Paternal Uncle     No Known Problems Maternal Grandmother     No Known Problems Maternal Grandfather     No Known Problems Paternal Grandmother     No Known Problems Paternal Grandfather     Breast cancer Neg Hx     Colon cancer Neg Hx     Ovarian cancer Neg Hx     Amblyopia Neg Hx     Blindness Neg Hx     Cancer Neg Hx     Cataracts Neg Hx     Diabetes Neg Hx     Glaucoma Neg Hx     Macular degeneration Neg Hx     Retinal detachment Neg Hx     Strabismus Neg Hx     Stroke Neg Hx     Thyroid " disease Neg Hx      Social History   Substance Use Topics    Smoking status: Never Smoker    Smokeless tobacco: Not on file    Alcohol use No     Review of Systems   Constitutional: Positive for activity change and appetite change. Negative for fever.   HENT: Negative for sore throat.    Respiratory: Negative for shortness of breath.    Cardiovascular: Negative for chest pain.   Gastrointestinal: Positive for abdominal pain, diarrhea, nausea and vomiting.   Genitourinary: Negative for dysuria.   Musculoskeletal: Negative for back pain.   Skin: Negative for rash.   Neurological: Positive for weakness and headaches (resolved).   Hematological: Does not bruise/bleed easily.       Physical Exam     Initial Vitals   BP Pulse Resp Temp SpO2   03/25/18 1534 03/25/18 1530 03/25/18 1530 03/25/18 1530 03/25/18 1530   (!) 151/91 (!) 52 18 98 °F (36.7 °C) 100 %      MAP       03/25/18 1534       111         Physical Exam    Nursing note and vitals reviewed.  Constitutional: She appears well-developed and well-nourished.   HENT:   Head: Normocephalic and atraumatic.   Eyes: Conjunctivae and EOM are normal. Pupils are equal, round, and reactive to light.   Neck: Normal range of motion. Neck supple.   Cardiovascular: Normal rate and regular rhythm.   Pulmonary/Chest: Breath sounds normal.   Abdominal: Soft. There is generalized tenderness. There is no rebound and no guarding.   Musculoskeletal: Normal range of motion.   Neurological: She is alert and oriented to person, place, and time. She has normal strength.   Skin: Skin is warm and dry.   Psychiatric: She has a normal mood and affect.         ED Course   Procedures  Labs Reviewed   POCT URINE PREGNANCY   POCT CBC   POCT CMP   POCT AMYLASE             Medical Decision Making:   Initial Assessment:   23-year-old who complains of generalized abdominal pain associated with vomiting and diarrhea.  On exam patient has diffuse tenderness to her abdomen.  ED Management:  Patient  will be given IV fluids, Zofran and Toradol.  Baseline labs including amylase will be sent.  UPT is negative.  No significant lab abnormalities were found.  Patient is able to tolerate liquids.  She'll be discharged on Bentyl, Zofran and advised on a clear liquid diet.                      Clinical Impression:   The encounter diagnosis was Gastroenteritis.                           Alida Ndiaye MD  03/25/18 1916

## 2018-03-27 ENCOUNTER — HOSPITAL ENCOUNTER (EMERGENCY)
Facility: OTHER | Age: 23
Discharge: HOME | End: 2018-03-27
Attending: EMERGENCY MEDICINE
Payer: MEDICAID

## 2018-03-27 VITALS
OXYGEN SATURATION: 100 % | HEART RATE: 72 BPM | SYSTOLIC BLOOD PRESSURE: 128 MMHG | BODY MASS INDEX: 19.29 KG/M2 | HEIGHT: 66 IN | DIASTOLIC BLOOD PRESSURE: 79 MMHG | RESPIRATION RATE: 18 BRPM | WEIGHT: 120 LBS | TEMPERATURE: 99 F

## 2018-03-27 DIAGNOSIS — R19.7 VOMITING AND DIARRHEA: Primary | ICD-10-CM

## 2018-03-27 DIAGNOSIS — R11.10 VOMITING AND DIARRHEA: Primary | ICD-10-CM

## 2018-03-27 DIAGNOSIS — K52.9 GASTROENTERITIS: ICD-10-CM

## 2018-03-27 LAB
ALBUMIN SERPL-MCNC: 4.3 G/DL (ref 3.3–5.5)
ALBUMIN SERPL-MCNC: 4.7 G/DL (ref 3.3–5.5)
ALP SERPL-CCNC: 65 U/L (ref 42–141)
ALP SERPL-CCNC: 67 U/L (ref 42–141)
BILIRUB SERPL-MCNC: 0.7 MG/DL (ref 0.2–1.6)
BILIRUB SERPL-MCNC: 1 MG/DL (ref 0.2–1.6)
BILIRUBIN, POC UA: ABNORMAL
BLOOD, POC UA: ABNORMAL
BUN SERPL-MCNC: 15 MG/DL (ref 7–22)
CALCIUM SERPL-MCNC: 9.4 MG/DL (ref 8–10.3)
CHLORIDE SERPL-SCNC: 101 MMOL/L (ref 98–108)
CLARITY, POC UA: CLEAR
COLOR, POC UA: YELLOW
CREAT SERPL-MCNC: 0.8 MG/DL (ref 0.6–1.2)
GLUCOSE SERPL-MCNC: 87 MG/DL (ref 73–118)
GLUCOSE, POC UA: NEGATIVE
KETONES, POC UA: ABNORMAL
LEUKOCYTE EST, POC UA: NEGATIVE
NITRITE, POC UA: NEGATIVE
PH UR STRIP: 6.5 [PH]
POC ALT (SGPT): 37 U/L (ref 10–47)
POC ALT (SGPT): 40 U/L (ref 10–47)
POC AMYLASE: 228 U/L (ref 14–97)
POC AST (SGOT): 49 U/L (ref 11–38)
POC AST (SGOT): 49 U/L (ref 11–38)
POC GGT: 12 U/L (ref 5–65)
POC TCO2: 26 MMOL/L (ref 18–33)
POTASSIUM BLD-SCNC: 3.2 MMOL/L (ref 3.6–5.1)
PROTEIN, POC UA: ABNORMAL
PROTEIN, POC: 7.4 G/DL (ref 6.4–8.1)
PROTEIN, POC: 7.7 G/DL (ref 6.4–8.1)
SODIUM BLD-SCNC: 138 MMOL/L (ref 128–145)
SPECIFIC GRAVITY, POC UA: >=1.03
UROBILINOGEN, POC UA: 1 E.U./DL

## 2018-03-27 PROCEDURE — 96374 THER/PROPH/DIAG INJ IV PUSH: CPT | Mod: 59

## 2018-03-27 PROCEDURE — 82150 ASSAY OF AMYLASE: CPT

## 2018-03-27 PROCEDURE — 96361 HYDRATE IV INFUSION ADD-ON: CPT

## 2018-03-27 PROCEDURE — 80053 COMPREHEN METABOLIC PANEL: CPT

## 2018-03-27 PROCEDURE — 87077 CULTURE AEROBIC IDENTIFY: CPT

## 2018-03-27 PROCEDURE — 87427 SHIGA-LIKE TOXIN AG IA: CPT

## 2018-03-27 PROCEDURE — 99284 EMERGENCY DEPT VISIT MOD MDM: CPT | Mod: 25

## 2018-03-27 PROCEDURE — 25000003 PHARM REV CODE 250: Performed by: EMERGENCY MEDICINE

## 2018-03-27 PROCEDURE — 81003 URINALYSIS AUTO W/O SCOPE: CPT

## 2018-03-27 PROCEDURE — 87186 SC STD MICRODIL/AGAR DIL: CPT

## 2018-03-27 PROCEDURE — 85025 COMPLETE CBC W/AUTO DIFF WBC: CPT

## 2018-03-27 PROCEDURE — 87045 FECES CULTURE AEROBIC BACT: CPT

## 2018-03-27 PROCEDURE — 25500020 PHARM REV CODE 255: Performed by: EMERGENCY MEDICINE

## 2018-03-27 PROCEDURE — 63600175 PHARM REV CODE 636 W HCPCS: Performed by: EMERGENCY MEDICINE

## 2018-03-27 PROCEDURE — 87046 STOOL CULTR AEROBIC BACT EA: CPT | Mod: 59

## 2018-03-27 RX ORDER — CIPROFLOXACIN 500 MG/1
500 TABLET ORAL 2 TIMES DAILY
Qty: 14 TABLET | Refills: 0 | Status: SHIPPED | OUTPATIENT
Start: 2018-03-27 | End: 2018-04-03

## 2018-03-27 RX ORDER — POTASSIUM CHLORIDE 20 MEQ/1
40 TABLET, EXTENDED RELEASE ORAL
Status: COMPLETED | OUTPATIENT
Start: 2018-03-27 | End: 2018-03-27

## 2018-03-27 RX ORDER — METOCLOPRAMIDE 10 MG/1
10 TABLET ORAL EVERY 6 HOURS PRN
Qty: 20 TABLET | Refills: 0 | Status: SHIPPED | OUTPATIENT
Start: 2018-03-27 | End: 2018-04-09

## 2018-03-27 RX ORDER — METRONIDAZOLE 500 MG/1
500 TABLET ORAL
Status: COMPLETED | OUTPATIENT
Start: 2018-03-27 | End: 2018-03-27

## 2018-03-27 RX ORDER — METOCLOPRAMIDE HYDROCHLORIDE 5 MG/ML
10 INJECTION INTRAMUSCULAR; INTRAVENOUS
Status: DISCONTINUED | OUTPATIENT
Start: 2018-03-27 | End: 2018-03-27

## 2018-03-27 RX ORDER — SODIUM CHLORIDE 9 MG/ML
1000 INJECTION, SOLUTION INTRAVENOUS
Status: COMPLETED | OUTPATIENT
Start: 2018-03-27 | End: 2018-03-27

## 2018-03-27 RX ORDER — METOCLOPRAMIDE HYDROCHLORIDE 5 MG/ML
5 INJECTION INTRAMUSCULAR; INTRAVENOUS
Status: COMPLETED | OUTPATIENT
Start: 2018-03-27 | End: 2018-03-27

## 2018-03-27 RX ORDER — METRONIDAZOLE 500 MG/1
500 TABLET ORAL 3 TIMES DAILY
Qty: 21 TABLET | Refills: 0 | Status: SHIPPED | OUTPATIENT
Start: 2018-03-27 | End: 2018-04-03

## 2018-03-27 RX ORDER — CIPROFLOXACIN 500 MG/1
500 TABLET ORAL
Status: COMPLETED | OUTPATIENT
Start: 2018-03-27 | End: 2018-03-27

## 2018-03-27 RX ADMIN — IOHEXOL 75 ML: 350 INJECTION, SOLUTION INTRAVENOUS at 11:03

## 2018-03-27 RX ADMIN — METOCLOPRAMIDE 5 MG: 5 INJECTION, SOLUTION INTRAMUSCULAR; INTRAVENOUS at 11:03

## 2018-03-27 RX ADMIN — METRONIDAZOLE 500 MG: 500 TABLET ORAL at 02:03

## 2018-03-27 RX ADMIN — CIPROFLOXACIN 500 MG: 500 TABLET, FILM COATED ORAL at 02:03

## 2018-03-27 RX ADMIN — SODIUM CHLORIDE 1000 ML: 0.9 INJECTION, SOLUTION INTRAVENOUS at 09:03

## 2018-03-27 RX ADMIN — POTASSIUM CHLORIDE 40 MEQ: 20 TABLET, EXTENDED RELEASE ORAL at 11:03

## 2018-03-27 RX ADMIN — SODIUM CHLORIDE 1000 ML: 0.9 INJECTION, SOLUTION INTRAVENOUS at 11:03

## 2018-03-27 NOTE — ED NOTES
Patient has verified the spelling of their name and  on armband    LOC: The patient is awake, alert, and aware of environment with an appropriate affect, the patient is oriented x 4 and speaking appropriately.     APPEARANCE: Patient appears uncomfortable.     RESPIRATORY: Airway is open and patent, respirations are spontaneous, patient has a normal effort and rate, no accessory muscle use noted, bilateral breath sounds clear, denies SOB     CARDIAC:  No chest pain, chest pressure or chest discomfort. No palpitations.     HEENT: No visual loss, blurred vision, double vision or yellow sclera. No hearing loss, sneezing, congestion, runny nose or sore throat.    GASTROINTESTINAL: +Tenderness noted to LLQ and RLQ upon palpation. +Nausea. Unable to tolerate jasmin fluids or food. No distention noted, normoactive bowel sounds present in all four quadrants.    SKIN: The skin is warm and dry, color consistent with ethnicity, patient has normal skin turgor and moist mucus membranes, skin intact, no breakdown or bruising noted.     GENITOURINARY: Voids without difficulty     COMPLAINT: Patient presented to the ED for abdominal pain with nausea x 1 week.

## 2018-03-27 NOTE — DISCHARGE INSTRUCTIONS
Rest. Drink plenty of fluids. Return if you are unable to keep your medicine down or if you are not improving quickly. Follow up with a GI (abdomen) doctor for recheck, and you may always return here if needed, but you need to be seen in about 2 days for recheck.

## 2018-03-27 NOTE — ED PROVIDER NOTES
"Encounter Date: 3/27/2018       History     Chief Complaint   Patient presents with    Nausea     pt reports seen here Sunday for same symptoms, unable to keep zofran down..      Ms. Smart was seen here on Sunday and diagnosed with gastroenteritis.  Reports nausea vomiting and diarrhea for several days, although the vomiting has persisted and the diarrhea has "slacked off".  She reports she is unable to keep anything down, even water.  She's been trying the Zofran with sips of water instead of dissolving.  She reports she feels weak and constantly fatigued.  She does get lightheaded when she stands up.  She is still trying to perform her normal ADLs and care for Her 2 small children, although she states she feels rundown.  She reports she had her last menstrual cycle on the day that she was seen Sunday.  Denies abdominal pain, she states her whole abdomen is just "sore" from vomiting. Reports watery diarrhea is improving.       The history is provided by the patient.   Emesis    This is a new problem. Illness onset: 3 days ago. The problem occurs 5 - 10 times per day. The problem has been unchanged. The emesis has an appearance of stomach contents. Associated symptoms include diarrhea. Pertinent negatives include no abdominal pain, no arthralgias, no chills, no cough, no fever, no headaches, no myalgias, no sweats and no URI.     Review of patient's allergies indicates:  No Known Allergies  Past Medical History:   Diagnosis Date    Asthma     Blood clot associated with vein wall inflammation      Past Surgical History:   Procedure Laterality Date    CHOLECYSTECTOMY  2010    VAGINAL DELIVERY      X 2     Family History   Problem Relation Age of Onset    Hypertension Mother     No Known Problems Brother     No Known Problems Brother     No Known Problems Brother     No Known Problems Father     No Known Problems Sister     No Known Problems Maternal Aunt     No Known Problems Maternal Uncle     No " Known Problems Paternal Aunt     No Known Problems Paternal Uncle     No Known Problems Maternal Grandmother     No Known Problems Maternal Grandfather     No Known Problems Paternal Grandmother     No Known Problems Paternal Grandfather     Breast cancer Neg Hx     Colon cancer Neg Hx     Ovarian cancer Neg Hx     Amblyopia Neg Hx     Blindness Neg Hx     Cancer Neg Hx     Cataracts Neg Hx     Diabetes Neg Hx     Glaucoma Neg Hx     Macular degeneration Neg Hx     Retinal detachment Neg Hx     Strabismus Neg Hx     Stroke Neg Hx     Thyroid disease Neg Hx      Social History   Substance Use Topics    Smoking status: Never Smoker    Smokeless tobacco: Not on file    Alcohol use No     Review of Systems   Constitutional: Negative for chills and fever.   Respiratory: Negative for cough.    Gastrointestinal: Positive for diarrhea, nausea and vomiting. Negative for abdominal pain.   Musculoskeletal: Negative for arthralgias and myalgias.   Neurological: Negative for headaches.   All other systems reviewed and are negative.      Physical Exam     Initial Vitals   BP Pulse Resp Temp SpO2   03/27/18 0919 03/27/18 0918 03/27/18 0918 03/27/18 0918 03/27/18 0918   131/81 66 17 98.2 °F (36.8 °C) 100 %      MAP       03/27/18 0919       97.67         Physical Exam    Nursing note and vitals reviewed.  Constitutional: She appears well-developed and well-nourished. She is not diaphoretic. No distress.   HENT:   Head: Normocephalic and atraumatic.   Eyes: Conjunctivae and EOM are normal.   Neck: Normal range of motion. Neck supple.   Cardiovascular: Normal rate, regular rhythm and normal heart sounds.   No murmur heard.  Pulmonary/Chest: Breath sounds normal. No respiratory distress. She has no wheezes. She has no rales.   Abdominal: Soft. She exhibits no distension and no mass. There is no rebound and no guarding.   Mild diffuse upper abd and periumbilical ttp.    Musculoskeletal: Normal range of motion. She  exhibits no edema or tenderness.   Neurological: She is alert and oriented to person, place, and time. No cranial nerve deficit or sensory deficit.   Skin: Skin is warm and dry. Capillary refill takes less than 2 seconds. No erythema.   Psychiatric: She has a normal mood and affect. Her behavior is normal. Thought content normal.         ED Course   Procedures  Labs Reviewed   POCT CBC   POCT URINALYSIS W/O SCOPE   POCT CMP   POCT AMYLASE             Medical Decision Making:   Clinical Tests:   Lab Tests: Ordered and Reviewed  Radiological Study: Ordered and Reviewed  ED Management:  Ms Smart has been stable during her time int he ER. Feels much better. Reglan seems to have helped more than the zofran.  We discussed lab results and CT results.  She does not have a primary care physician at this time.  White count has increased from 10-12.  CT results noted. Ms Smart has been given 2 L of fluid and has tolerated by mouth potassium, and is given first dose of antibiotics and a snack by mouth. It is apporopriate to start abx given CT findings in small bowel/intestine, increased wbc. No fever, and she has been able to give stool sample. NOT a surgical abdomen clinically. abd rmains soft and mildly tender in LUQ clinically. No vomiting in ed and one stool. She understands the need to have a recheck in 2 days.  Will be given name and phone number for GI follow-up.  We discussed home care and worrisome signs that should prompt need to return to er should they occur. There is no indication for further emergent intervention or evaluation at this time.                         Clinical Impression:   The primary encounter diagnosis was Vomiting and diarrhea. A diagnosis of Gastroenteritis was also pertinent to this visit.                           Jerel Urias MD  03/29/18 [id number]

## 2018-03-28 LAB
E COLI SXT1 STL QL IA: NEGATIVE
E COLI SXT2 STL QL IA: NEGATIVE

## 2018-03-30 LAB — BACTERIA STL CULT: NORMAL

## 2018-04-09 ENCOUNTER — HOSPITAL ENCOUNTER (EMERGENCY)
Facility: HOSPITAL | Age: 23
Discharge: HOME | End: 2018-04-09
Attending: EMERGENCY MEDICINE
Payer: MEDICAID

## 2018-04-09 ENCOUNTER — HOSPITAL ENCOUNTER (EMERGENCY)
Facility: OTHER | Age: 23
Discharge: STILL ADMITTED OR ADMINISTRATIVE | End: 2018-04-09
Attending: EMERGENCY MEDICINE
Payer: MEDICAID

## 2018-04-09 VITALS
SYSTOLIC BLOOD PRESSURE: 144 MMHG | WEIGHT: 150 LBS | TEMPERATURE: 98 F | HEIGHT: 66 IN | BODY MASS INDEX: 24.11 KG/M2 | RESPIRATION RATE: 16 BRPM | DIASTOLIC BLOOD PRESSURE: 98 MMHG | OXYGEN SATURATION: 100 % | HEART RATE: 73 BPM

## 2018-04-09 VITALS
TEMPERATURE: 98 F | OXYGEN SATURATION: 100 % | HEART RATE: 100 BPM | WEIGHT: 117 LBS | BODY MASS INDEX: 18.8 KG/M2 | SYSTOLIC BLOOD PRESSURE: 111 MMHG | HEIGHT: 66 IN | RESPIRATION RATE: 16 BRPM | DIASTOLIC BLOOD PRESSURE: 80 MMHG

## 2018-04-09 DIAGNOSIS — S02.2XXA CLOSED FRACTURE OF NASAL BONE, INITIAL ENCOUNTER: Primary | ICD-10-CM

## 2018-04-09 DIAGNOSIS — S00.33XA NASAL CONTUSION: ICD-10-CM

## 2018-04-09 DIAGNOSIS — S02.2XXA CLOSED FRACTURE OF NASAL BONE, INITIAL ENCOUNTER: ICD-10-CM

## 2018-04-09 DIAGNOSIS — M95.0: Primary | ICD-10-CM

## 2018-04-09 DIAGNOSIS — S00.33XA NASAL SEPTAL HEMATOMA, INITIAL ENCOUNTER: ICD-10-CM

## 2018-04-09 LAB
B-HCG UR QL: NEGATIVE
CTP QC/QA: YES

## 2018-04-09 PROCEDURE — 99284 EMERGENCY DEPT VISIT MOD MDM: CPT | Mod: 27,25

## 2018-04-09 PROCEDURE — 99499 UNLISTED E&M SERVICE: CPT | Mod: ,,, | Performed by: OTOLARYNGOLOGY

## 2018-04-09 PROCEDURE — 96374 THER/PROPH/DIAG INJ IV PUSH: CPT

## 2018-04-09 PROCEDURE — 25000003 PHARM REV CODE 250: Performed by: PHYSICIAN ASSISTANT

## 2018-04-09 PROCEDURE — 81025 URINE PREGNANCY TEST: CPT | Performed by: NURSE PRACTITIONER

## 2018-04-09 PROCEDURE — 63600175 PHARM REV CODE 636 W HCPCS: Performed by: NURSE PRACTITIONER

## 2018-04-09 PROCEDURE — 99285 EMERGENCY DEPT VISIT HI MDM: CPT | Mod: 25

## 2018-04-09 PROCEDURE — 99283 EMERGENCY DEPT VISIT LOW MDM: CPT | Mod: ,,, | Performed by: EMERGENCY MEDICINE

## 2018-04-09 RX ORDER — OXYCODONE AND ACETAMINOPHEN 5; 325 MG/1; MG/1
1 TABLET ORAL
Status: COMPLETED | OUTPATIENT
Start: 2018-04-09 | End: 2018-04-09

## 2018-04-09 RX ORDER — HYDROMORPHONE HYDROCHLORIDE 2 MG/ML
0.5 INJECTION, SOLUTION INTRAMUSCULAR; INTRAVENOUS; SUBCUTANEOUS
Status: COMPLETED | OUTPATIENT
Start: 2018-04-09 | End: 2018-04-09

## 2018-04-09 RX ORDER — CEPHALEXIN 500 MG/1
500 CAPSULE ORAL EVERY 12 HOURS
Qty: 14 CAPSULE | Refills: 0 | Status: SHIPPED | OUTPATIENT
Start: 2018-04-09 | End: 2018-04-09

## 2018-04-09 RX ORDER — OXYCODONE AND ACETAMINOPHEN 5; 325 MG/1; MG/1
1 TABLET ORAL EVERY 4 HOURS PRN
Qty: 18 TABLET | Refills: 0 | Status: SHIPPED | OUTPATIENT
Start: 2018-04-09 | End: 2018-05-04

## 2018-04-09 RX ORDER — CEPHALEXIN 250 MG/1
250 CAPSULE ORAL 4 TIMES DAILY
Qty: 28 CAPSULE | Refills: 0 | Status: SHIPPED | OUTPATIENT
Start: 2018-04-09 | End: 2018-04-09 | Stop reason: CLARIF

## 2018-04-09 RX ORDER — CEPHALEXIN 500 MG/1
500 CAPSULE ORAL
Status: COMPLETED | OUTPATIENT
Start: 2018-04-09 | End: 2018-04-09

## 2018-04-09 RX ORDER — HYDROMORPHONE HYDROCHLORIDE 2 MG/ML
0.5 INJECTION, SOLUTION INTRAMUSCULAR; INTRAVENOUS; SUBCUTANEOUS
Status: DISCONTINUED | OUTPATIENT
Start: 2018-04-09 | End: 2018-04-09

## 2018-04-09 RX ORDER — CEPHALEXIN 500 MG/1
500 CAPSULE ORAL EVERY 12 HOURS
Qty: 14 CAPSULE | Refills: 0 | Status: ON HOLD | OUTPATIENT
Start: 2018-04-09 | End: 2018-04-17

## 2018-04-09 RX ADMIN — OXYCODONE HYDROCHLORIDE AND ACETAMINOPHEN 1 TABLET: 5; 325 TABLET ORAL at 11:04

## 2018-04-09 RX ADMIN — HYDROMORPHONE HYDROCHLORIDE 0.5 MG: 2 INJECTION, SOLUTION INTRAMUSCULAR; INTRAVENOUS; SUBCUTANEOUS at 09:04

## 2018-04-09 RX ADMIN — CEPHALEXIN 500 MG: 500 CAPSULE ORAL at 11:04
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
## 2018-04-10 NOTE — DISCHARGE INSTRUCTIONS
Followup with ENT, Dr. Duke, this Friday April 13, 2018  Use percocet as needed for pain, take with caution  Return to the ED for any new symptoms      Our goal in the emergency department is to always give you outstanding care and exceptional service. You may receive a survey by mail or e-mail in the next week regarding your experience in our ED. We would greatly appreciate your completing and returning the survey. Your feedback provides us with a way to recognize our staff who give very good care and it helps us learn how to improve when your experience was below our aspiration of excellence.

## 2018-04-10 NOTE — ED PROVIDER NOTES
Encounter Date: 4/9/2018       History     Chief Complaint   Patient presents with    nasal fracture     Transfer from [place] for ENT consult for nasal fracture.      23-year-old female transferred to our facility from the stand alone clinic on the [facility] for ENT evaluation of a nasal fracture.   CV outside note for further details of the injury.  Upon arrival the patient is alert and oriented, no acute distress.          Review of patient's allergies indicates:  No Known Allergies  Past Medical History:   Diagnosis Date    Asthma     Blood clot associated with vein wall inflammation      Past Surgical History:   Procedure Laterality Date    CHOLECYSTECTOMY  2010    VAGINAL DELIVERY      X 2     Family History   Problem Relation Age of Onset    Hypertension Mother     No Known Problems Brother     No Known Problems Brother     No Known Problems Brother     No Known Problems Father     No Known Problems Sister     No Known Problems Maternal Aunt     No Known Problems Maternal Uncle     No Known Problems Paternal Aunt     No Known Problems Paternal Uncle     No Known Problems Maternal Grandmother     No Known Problems Maternal Grandfather     No Known Problems Paternal Grandmother     No Known Problems Paternal Grandfather     Breast cancer Neg Hx     Colon cancer Neg Hx     Ovarian cancer Neg Hx     Amblyopia Neg Hx     Blindness Neg Hx     Cancer Neg Hx     Cataracts Neg Hx     Diabetes Neg Hx     Glaucoma Neg Hx     Macular degeneration Neg Hx     Retinal detachment Neg Hx     Strabismus Neg Hx     Stroke Neg Hx     Thyroid disease Neg Hx      Social History   Substance Use Topics    Smoking status: Never Smoker    Smokeless tobacco: Not on file    Alcohol use No     Review of Systems   Constitutional: Negative for fever.   HENT: Positive for facial swelling and nosebleeds. Negative for sore throat.    Respiratory: Negative for shortness of breath.    Cardiovascular: Negative  for chest pain.   Gastrointestinal: Negative for nausea.   Genitourinary: Negative for dysuria.   Musculoskeletal: Negative for back pain.   Skin: Negative for rash.   Neurological: Negative for weakness.   Hematological: Does not bruise/bleed easily.       Physical Exam     Initial Vitals [04/09/18 2209]   BP Pulse Resp Temp SpO2   (!) 144/98 73 16 98.4 °F (36.9 °C) 100 %      MAP       113.33         Physical Exam    Constitutional: Vital signs are normal. She appears well-developed and well-nourished.   HENT:   Head: Normocephalic.   Obvious nasal deformity  No identifiable septal hematoma present  No active bleeding   Eyes: Conjunctivae are normal.   Cardiovascular: Normal rate and regular rhythm.   Abdominal: Soft. Normal appearance and bowel sounds are normal.   Musculoskeletal: Normal range of motion.   Neurological: She is alert and oriented to person, place, and time.   Skin: Skin is warm and intact.   Psychiatric: She has a normal mood and affect. Her speech is normal and behavior is normal. Cognition and memory are normal.         ED Course   Procedures  Labs Reviewed - No data to display          Medical Decision Making:   ED Management:  KEITH has seen the patient in the ER and recommends discharge with follow-up to ENT clinic Dr. Duke.   Patient will be sent home with antibiotics and pain medication  Return instructions given.                      Clinical Impression:   The encounter diagnosis was Closed fracture of nasal bone, initial encounter.    Disposition:   Disposition: Discharged  Condition: Stable                        Nikita Medrano PA-C  04/09/18 [id number]

## 2018-04-10 NOTE — ED NOTES
Medicated as per MAR at this time.  All questions answered.  Grateful for care.  Pt has ride home from ER

## 2018-04-13 ENCOUNTER — OFFICE VISIT (OUTPATIENT)
Dept: OTOLARYNGOLOGY | Facility: CLINIC | Age: 23
End: 2018-04-13
Payer: MEDICAID

## 2018-04-13 VITALS
TEMPERATURE: 97 F | HEART RATE: 82 BPM | BODY MASS INDEX: 20.07 KG/M2 | SYSTOLIC BLOOD PRESSURE: 125 MMHG | WEIGHT: 124.31 LBS | DIASTOLIC BLOOD PRESSURE: 88 MMHG

## 2018-04-13 DIAGNOSIS — S02.2XXA CLOSED FRACTURE OF NASAL BONE, INITIAL ENCOUNTER: Primary | ICD-10-CM

## 2018-04-13 DIAGNOSIS — S02.2XXA CLOSED FRACTURE OF NASAL SEPTUM, INITIAL ENCOUNTER: ICD-10-CM

## 2018-04-13 PROCEDURE — 99214 OFFICE O/P EST MOD 30 MIN: CPT | Mod: PBBFAC | Performed by: OTOLARYNGOLOGY

## 2018-04-13 PROCEDURE — 99204 OFFICE O/P NEW MOD 45 MIN: CPT | Mod: S$PBB,,, | Performed by: OTOLARYNGOLOGY

## 2018-04-13 PROCEDURE — 99999 PR PBB SHADOW E&M-EST. PATIENT-LVL IV: CPT | Mod: PBBFAC,,, | Performed by: OTOLARYNGOLOGY

## 2018-04-13 RX ORDER — DEXAMETHASONE SODIUM PHOSPHATE 4 MG/ML
8 INJECTION, SOLUTION INTRA-ARTICULAR; INTRALESIONAL; INTRAMUSCULAR; INTRAVENOUS; SOFT TISSUE
Status: CANCELLED | OUTPATIENT
Start: 2018-04-13

## 2018-04-13 RX ORDER — LIDOCAINE HYDROCHLORIDE 10 MG/ML
1 INJECTION, SOLUTION EPIDURAL; INFILTRATION; INTRACAUDAL; PERINEURAL ONCE
Status: CANCELLED | OUTPATIENT
Start: 2018-04-13 | End: 2018-04-13
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
## 2018-04-13 NOTE — PATIENT INSTRUCTIONS
Treatment for Broken Nose (Nasal Fracture) in Children  A nasal fracture is a break in 1 or more of the bones of the nose. Its also called a broken nose. Nasal fractures are more common in adults than in children. Childrens nasal bones are more difficult to fracture. But the nasal bone is one of the most commonly fractured bones of the face. The lower part of the nasal bone is thinner than the upper part and breaks more easily. In babies, nasal fracture can cause trouble breathing. This is because babies cant breathe through their mouths. A baby with nasal fracture needs emergency treatment.  Types of treatment  Your child may need to see an ear, nose, and throat doctor (otolaryngologist) for treatment. Treatment is based on your childs age, overall health, and the type of injury.  Your child will need to sit upright for a time after the injury. This helps to reduce swelling of the nose. It also helps to keep blood from pooling in the nose. First treatments may include pain medicines and ice.  Any bones in the nose that are out of place will need to be lined up normally. This is called reduction. This is a common part of treatment for nasal fracture. Your child may need this right away or at a later time. A reduction may be done by moving the bones back into place (closed reduction). In some cases, surgery is done to move the bones (open reduction). Reduction is often with general anesthesia. This means your child sleeps through the procedure and doesnt feel pain.  After reduction, the nose may need a splint. Your childs nose may not look exactly the way it did before. Rhinoplasty surgery (nose surgery) may help restore the nose to a better look.  If your childs nasal fracture is more severe, he or she might need a more complex surgery right after the injury. This is called septorhinoplasty. It can help restore normal look of the nose. It also fixes a displaced nasal septum and blocked nasal  airway.  Possible complications of a nasal fracture  Your health care team will work to prevent complications. Your childs risk for possible complications may vary according to age and the extent of injury. Some possible complications include:  · Pocket of infection in the septum (septal abscess)  · Pocket of blood in the septum (septal hematoma)  · Severe nosebleed  · Infection of the brain or tissues around the brain  · Blocked tear duct  · Abnormal connection between the nasal cavity and the mouth  · Underdevelopment of the maxillary bone, making the middle of the face look sunken  · Change in appearance of the nose  Complications often need treatment, such as antibiotics or surgery.  Protecting your childs nose during healing  After a nasal fracture, the nose needs time to heal. The nose is easy to injure again during this time. For this reason, most health care providers advise that children do not play any sports for at least 2 weeks. Your child should not play contact sports such as football or wrestling for at least 6 weeks.     When to call the health care provider  Call your childs health care provider right away if your child has any of these:  · Fever of 100.4°F (38.0°C) or higher  · Bleeding that doesnt stop  · Confusion  · Loss of consciousness   Date Last Reviewed: 7/21/2015  © 8882-0346 [employer]. [address] [place] [address], [place] [address]. All rights reserved. This information is not intended as a substitute for professional medical care. Always follow your healthcare professional's instructions.

## 2018-04-16 ENCOUNTER — TELEPHONE (OUTPATIENT)
Dept: OTOLARYNGOLOGY | Facility: CLINIC | Age: 23
End: 2018-04-16

## 2018-04-17 ENCOUNTER — ANESTHESIA (OUTPATIENT)
Dept: SURGERY | Facility: HOSPITAL | Age: 23
End: 2018-04-17
Payer: MEDICAID

## 2018-04-17 ENCOUNTER — HOSPITAL ENCOUNTER (OUTPATIENT)
Facility: HOSPITAL | Age: 23
Discharge: HOME | End: 2018-04-17
Attending: OTOLARYNGOLOGY | Admitting: OTOLARYNGOLOGY
Payer: MEDICAID

## 2018-04-17 ENCOUNTER — SURGERY (OUTPATIENT)
Age: 23
End: 2018-04-17

## 2018-04-17 ENCOUNTER — ANESTHESIA EVENT (OUTPATIENT)
Dept: SURGERY | Facility: HOSPITAL | Age: 23
End: 2018-04-17
Payer: MEDICAID

## 2018-04-17 VITALS
RESPIRATION RATE: 18 BRPM | TEMPERATURE: 97 F | HEART RATE: 72 BPM | HEIGHT: 66 IN | OXYGEN SATURATION: 100 % | WEIGHT: 121 LBS | BODY MASS INDEX: 19.44 KG/M2 | DIASTOLIC BLOOD PRESSURE: 92 MMHG | SYSTOLIC BLOOD PRESSURE: 142 MMHG

## 2018-04-17 DIAGNOSIS — S02.2XXG CLOSED FRACTURE OF NASAL BONE WITH DELAYED HEALING, SUBSEQUENT ENCOUNTER: Primary | ICD-10-CM

## 2018-04-17 DIAGNOSIS — S02.2XXA CLOSED FRACTURE OF NASAL BONE, INITIAL ENCOUNTER: ICD-10-CM

## 2018-04-17 DIAGNOSIS — S02.2XXA CLOSED FRACTURE OF NASAL SEPTUM, INITIAL ENCOUNTER: ICD-10-CM

## 2018-04-17 LAB
B-HCG UR QL: NEGATIVE
CTP QC/QA: YES

## 2018-04-17 PROCEDURE — D9220A PRA ANESTHESIA: Mod: ANES,,, | Performed by: ANESTHESIOLOGY

## 2018-04-17 PROCEDURE — 00160 ANES PX NOSE&SINUS NOS: CPT | Performed by: OTOLARYNGOLOGY

## 2018-04-17 PROCEDURE — 94761 N-INVAS EAR/PLS OXIMETRY MLT: CPT

## 2018-04-17 PROCEDURE — 71000015 HC POSTOP RECOV 1ST HR: Performed by: OTOLARYNGOLOGY

## 2018-04-17 PROCEDURE — 63600175 PHARM REV CODE 636 W HCPCS: Performed by: ANESTHESIOLOGY

## 2018-04-17 PROCEDURE — 25000003 PHARM REV CODE 250: Performed by: NURSE ANESTHETIST, CERTIFIED REGISTERED

## 2018-04-17 PROCEDURE — 25000003 PHARM REV CODE 250

## 2018-04-17 PROCEDURE — 37000008 HC ANESTHESIA 1ST 15 MINUTES: Performed by: OTOLARYNGOLOGY

## 2018-04-17 PROCEDURE — 21337 CLOSED TX SEPTAL&NOSE FX: CPT | Mod: ,,, | Performed by: OTOLARYNGOLOGY

## 2018-04-17 PROCEDURE — 81025 URINE PREGNANCY TEST: CPT | Performed by: OTOLARYNGOLOGY

## 2018-04-17 PROCEDURE — 36000704 HC OR TIME LEV I 1ST 15 MIN: Performed by: OTOLARYNGOLOGY

## 2018-04-17 PROCEDURE — 37000009 HC ANESTHESIA EA ADD 15 MINS: Performed by: OTOLARYNGOLOGY

## 2018-04-17 PROCEDURE — 36000705 HC OR TIME LEV I EA ADD 15 MIN: Performed by: OTOLARYNGOLOGY

## 2018-04-17 PROCEDURE — 71000033 HC RECOVERY, INTIAL HOUR: Performed by: OTOLARYNGOLOGY

## 2018-04-17 PROCEDURE — 25000003 PHARM REV CODE 250: Performed by: OTOLARYNGOLOGY

## 2018-04-17 PROCEDURE — 71000039 HC RECOVERY, EACH ADD'L HOUR: Performed by: OTOLARYNGOLOGY

## 2018-04-17 PROCEDURE — 63600175 PHARM REV CODE 636 W HCPCS: Performed by: NURSE ANESTHETIST, CERTIFIED REGISTERED

## 2018-04-17 PROCEDURE — D9220A PRA ANESTHESIA: Mod: CRNA,,, | Performed by: NURSE ANESTHETIST, CERTIFIED REGISTERED

## 2018-04-17 RX ORDER — SODIUM CHLORIDE 9 MG/ML
INJECTION, SOLUTION INTRAVENOUS CONTINUOUS PRN
Status: DISCONTINUED | OUTPATIENT
Start: 2018-04-17 | End: 2018-04-17

## 2018-04-17 RX ORDER — DEXAMETHASONE SODIUM PHOSPHATE 4 MG/ML
INJECTION, SOLUTION INTRA-ARTICULAR; INTRALESIONAL; INTRAMUSCULAR; INTRAVENOUS; SOFT TISSUE
Status: DISCONTINUED | OUTPATIENT
Start: 2018-04-17 | End: 2018-04-17

## 2018-04-17 RX ORDER — LIDOCAINE HYDROCHLORIDE 10 MG/ML
1 INJECTION, SOLUTION EPIDURAL; INFILTRATION; INTRACAUDAL; PERINEURAL ONCE
Status: DISCONTINUED | OUTPATIENT
Start: 2018-04-17 | End: 2018-04-17 | Stop reason: HOSPADM

## 2018-04-17 RX ORDER — PROPOFOL 10 MG/ML
VIAL (ML) INTRAVENOUS
Status: DISCONTINUED | OUTPATIENT
Start: 2018-04-17 | End: 2018-04-17

## 2018-04-17 RX ORDER — OXYMETAZOLINE HCL 0.05 %
SPRAY, NON-AEROSOL (ML) NASAL
Status: DISCONTINUED | OUTPATIENT
Start: 2018-04-17 | End: 2018-04-17 | Stop reason: HOSPADM

## 2018-04-17 RX ORDER — OXYCODONE AND ACETAMINOPHEN 5; 325 MG/1; MG/1
1 TABLET ORAL EVERY 4 HOURS PRN
Qty: 10 TABLET | Refills: 0 | Status: SHIPPED | OUTPATIENT
Start: 2018-04-17 | End: 2018-04-25 | Stop reason: SDUPTHER

## 2018-04-17 RX ORDER — LIDOCAINE HYDROCHLORIDE AND EPINEPHRINE 10; 10 MG/ML; UG/ML
INJECTION, SOLUTION INFILTRATION; PERINEURAL
Status: DISCONTINUED | OUTPATIENT
Start: 2018-04-17 | End: 2018-04-17 | Stop reason: HOSPADM

## 2018-04-17 RX ORDER — FENTANYL CITRATE 50 UG/ML
INJECTION, SOLUTION INTRAMUSCULAR; INTRAVENOUS
Status: DISCONTINUED | OUTPATIENT
Start: 2018-04-17 | End: 2018-04-17

## 2018-04-17 RX ORDER — KETOROLAC TROMETHAMINE 30 MG/ML
30 INJECTION, SOLUTION INTRAMUSCULAR; INTRAVENOUS ONCE
Status: COMPLETED | OUTPATIENT
Start: 2018-04-17 | End: 2018-04-17

## 2018-04-17 RX ORDER — LIDOCAINE HCL/PF 100 MG/5ML
SYRINGE (ML) INTRAVENOUS
Status: DISCONTINUED | OUTPATIENT
Start: 2018-04-17 | End: 2018-04-17

## 2018-04-17 RX ORDER — GLYCOPYRROLATE 0.2 MG/ML
INJECTION INTRAMUSCULAR; INTRAVENOUS
Status: DISCONTINUED | OUTPATIENT
Start: 2018-04-17 | End: 2018-04-17

## 2018-04-17 RX ORDER — OXYCODONE AND ACETAMINOPHEN 5; 325 MG/1; MG/1
1 TABLET ORAL ONCE
Status: COMPLETED | OUTPATIENT
Start: 2018-04-17 | End: 2018-04-17

## 2018-04-17 RX ORDER — NEOSTIGMINE METHYLSULFATE 1 MG/ML
INJECTION, SOLUTION INTRAVENOUS
Status: DISCONTINUED | OUTPATIENT
Start: 2018-04-17 | End: 2018-04-17

## 2018-04-17 RX ORDER — MIDAZOLAM HYDROCHLORIDE 1 MG/ML
INJECTION, SOLUTION INTRAMUSCULAR; INTRAVENOUS
Status: DISCONTINUED | OUTPATIENT
Start: 2018-04-17 | End: 2018-04-17

## 2018-04-17 RX ORDER — SUCCINYLCHOLINE CHLORIDE 20 MG/ML
INJECTION INTRAMUSCULAR; INTRAVENOUS
Status: DISCONTINUED | OUTPATIENT
Start: 2018-04-17 | End: 2018-04-17

## 2018-04-17 RX ORDER — ACETAMINOPHEN 10 MG/ML
INJECTION, SOLUTION INTRAVENOUS
Status: DISCONTINUED | OUTPATIENT
Start: 2018-04-17 | End: 2018-04-17

## 2018-04-17 RX ORDER — ONDANSETRON 2 MG/ML
INJECTION INTRAMUSCULAR; INTRAVENOUS
Status: DISCONTINUED | OUTPATIENT
Start: 2018-04-17 | End: 2018-04-17

## 2018-04-17 RX ORDER — DEXAMETHASONE SODIUM PHOSPHATE 4 MG/ML
8 INJECTION, SOLUTION INTRA-ARTICULAR; INTRALESIONAL; INTRAMUSCULAR; INTRAVENOUS; SOFT TISSUE
Status: DISCONTINUED | OUTPATIENT
Start: 2018-04-17 | End: 2018-04-17 | Stop reason: HOSPADM

## 2018-04-17 RX ORDER — ROCURONIUM BROMIDE 10 MG/ML
INJECTION, SOLUTION INTRAVENOUS
Status: DISCONTINUED | OUTPATIENT
Start: 2018-04-17 | End: 2018-04-17

## 2018-04-17 RX ORDER — OXYCODONE AND ACETAMINOPHEN 5; 325 MG/1; MG/1
TABLET ORAL
Status: COMPLETED
Start: 2018-04-17 | End: 2018-04-17

## 2018-04-17 RX ORDER — ONDANSETRON 8 MG/1
8 TABLET, ORALLY DISINTEGRATING ORAL EVERY 6 HOURS PRN
Qty: 10 TABLET | Refills: 0 | Status: SHIPPED | OUTPATIENT
Start: 2018-04-17 | End: 2018-05-04

## 2018-04-17 RX ORDER — SODIUM CHLORIDE 0.9 % (FLUSH) 0.9 %
3 SYRINGE (ML) INJECTION
Status: DISCONTINUED | OUTPATIENT
Start: 2018-04-17 | End: 2018-04-17 | Stop reason: HOSPADM

## 2018-04-17 RX ORDER — FENTANYL CITRATE 50 UG/ML
25 INJECTION, SOLUTION INTRAMUSCULAR; INTRAVENOUS EVERY 5 MIN PRN
Status: DISCONTINUED | OUTPATIENT
Start: 2018-04-17 | End: 2018-04-17 | Stop reason: HOSPADM

## 2018-04-17 RX ADMIN — LIDOCAINE HYDROCHLORIDE AND EPINEPHRINE 3 ML: 10; 10 INJECTION, SOLUTION INFILTRATION; PERINEURAL at 01:04

## 2018-04-17 RX ADMIN — KETOROLAC TROMETHAMINE 30 MG: 30 INJECTION, SOLUTION INTRAMUSCULAR at 03:04

## 2018-04-17 RX ADMIN — GLYCOPYRROLATE 0.4 MG: 0.2 INJECTION, SOLUTION INTRAMUSCULAR; INTRAVENOUS at 02:04

## 2018-04-17 RX ADMIN — ROCURONIUM BROMIDE 10 MG: 10 INJECTION, SOLUTION INTRAVENOUS at 01:04

## 2018-04-17 RX ADMIN — PROPOFOL 50 MG: 10 INJECTION, EMULSION INTRAVENOUS at 01:04

## 2018-04-17 RX ADMIN — FENTANYL CITRATE 100 MCG: 50 INJECTION, SOLUTION INTRAMUSCULAR; INTRAVENOUS at 01:04

## 2018-04-17 RX ADMIN — ONDANSETRON 4 MG: 2 INJECTION INTRAMUSCULAR; INTRAVENOUS at 01:04

## 2018-04-17 RX ADMIN — MIDAZOLAM HYDROCHLORIDE 2 MG: 1 INJECTION, SOLUTION INTRAMUSCULAR; INTRAVENOUS at 01:04

## 2018-04-17 RX ADMIN — FENTANYL CITRATE 50 MCG: 50 INJECTION, SOLUTION INTRAMUSCULAR; INTRAVENOUS at 01:04

## 2018-04-17 RX ADMIN — PROPOFOL 150 MG: 10 INJECTION, EMULSION INTRAVENOUS at 01:04

## 2018-04-17 RX ADMIN — SODIUM CHLORIDE: 0.9 INJECTION, SOLUTION INTRAVENOUS at 01:04

## 2018-04-17 RX ADMIN — OXYMETAZOLINE HYDROCHLORIDE 3 SPRAY: 0.05 SPRAY NASAL at 01:04

## 2018-04-17 RX ADMIN — OXYCODONE AND ACETAMINOPHEN 1 TABLET: 5; 325 TABLET ORAL at 02:04

## 2018-04-17 RX ADMIN — DEXAMETHASONE SODIUM PHOSPHATE 12 MG: 4 INJECTION, SOLUTION INTRAMUSCULAR; INTRAVENOUS at 01:04

## 2018-04-17 RX ADMIN — SUCCINYLCHOLINE CHLORIDE 120 MG: 20 INJECTION, SOLUTION INTRAMUSCULAR; INTRAVENOUS at 01:04

## 2018-04-17 RX ADMIN — LIDOCAINE HYDROCHLORIDE 75 MG: 20 INJECTION, SOLUTION INTRAVENOUS at 01:04

## 2018-04-17 RX ADMIN — NEOSTIGMINE METHYLSULFATE 3 MG: 1 INJECTION INTRAVENOUS at 02:04

## 2018-04-17 RX ADMIN — OXYCODONE HYDROCHLORIDE AND ACETAMINOPHEN 1 TABLET: 5; 325 TABLET ORAL at 02:04

## 2018-04-17 RX ADMIN — ACETAMINOPHEN 1000 MG: 10 INJECTION, SOLUTION INTRAVENOUS at 01:04

## 2018-04-17 RX ADMIN — FENTANYL CITRATE 25 MCG: 50 INJECTION INTRAMUSCULAR; INTRAVENOUS at 02:04
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
## 2018-04-17 NOTE — INTERVAL H&P NOTE
The patient has been examined and the H&P has been reviewed:    I concur with the findings and no changes have occurred since H&P was written.    Anesthesia/Surgery risks, benefits and alternative options discussed and understood by patient/family.          Active Hospital Problems    Diagnosis  POA    Closed fracture of nasal bones [S02.2XXA]  Yes      Resolved Hospital Problems    Diagnosis Date Resolved POA   No resolved problems to display.

## 2018-04-17 NOTE — PLAN OF CARE
Discharge instructions reviewed with pt. Understanding verbalized. Paper prescriptions given. Pt able to tolerate PO intake and urinate in restroom. Pt reports pain to be tolerable. To be transported to car by PCT.

## 2018-04-17 NOTE — BRIEF OP NOTE
[facility]  Brief Operative Note     SUMMARY     Surgery Date: 4/17/2018     Surgeon(s) and Role:     * Zack Waters MD - Resident - Assisting     * Kelechi Duke MD - Primary        Pre-op Diagnosis:  Closed fracture of nasal bone, initial encounter [S02.2XXA]  Closed fracture of nasal septum, initial encounter [S02.2XXA]    Post-op Diagnosis:  Post-Op Diagnosis Codes:     * Closed fracture of nasal bone, initial encounter [S02.2XXA]     * Closed fracture of nasal septum, initial encounter [S02.2XXA]    Procedure(s) (LRB):  REDUCTION-CLOSED-FRACTURE-NASAL Septal (N/A)    Anesthesia: General    Description of the findings of the procedure: Closed reduction of nasal fracture and septal fracture.    Findings/Key Components: See op note for details.    Estimated Blood Loss: * No values recorded between 4/17/2018  1:39 PM and 4/17/2018  2:09 PM *         Specimens:   Specimen (12h ago through future)    None          Discharge Note    SUMMARY     Admit Date: 4/17/2018    Discharge Date and Time:  04/17/2018 2:43 PM    Hospital Course Patient tolerated above procedure without complication and stable for discharge postoperatively.     Final Diagnosis: Post-Op Diagnosis Codes:     * Closed fracture of nasal bone, initial encounter [S02.2XXA]     * Closed fracture of nasal septum, initial encounter [S02.2XXA]    Disposition: Home or Self Care    Follow Up/Patient Instructions:     Medications:  Reconciled Home Medications:      Medication List      START taking these medications    ondansetron 8 MG Tbdl  Commonly known as:  ZOFRAN-ODT  Take 1 tablet (8 mg total) by mouth every 6 (six) hours as needed (nausea).        CHANGE how you take these medications    * oxyCODONE-acetaminophen 5-325 mg per tablet  Commonly known as:  PERCOCET  Take 1 tablet by mouth every 4 (four) hours as needed for Pain.  What changed:  Another medication with the same name was added. Make sure you understand how and when  to take each.     * oxyCODONE-acetaminophen 5-325 mg per tablet  Commonly known as:  PERCOCET  Take 1 tablet by mouth every 4 (four) hours as needed for Pain.  What changed:  You were already taking a medication with the same name, and this prescription was added. Make sure you understand how and when to take each.        * This list has 2 medication(s) that are the same as other medications prescribed for you. Read the directions carefully, and ask your doctor or other care provider to review them with you.                Discharge Procedure Orders  Diet Adult Regular     Activity as tolerated     Notify your health care provider if you experience any of the following:  severe uncontrolled pain     Notify your health care provider if you experience any of the following:  redness, tenderness, or signs of infection (pain, swelling, redness, odor or green/yellow discharge around incision site)     Notify your health care provider if you experience any of the following:  difficulty breathing or increased cough     Leave dressing on - Keep it clean, dry, and intact until clinic visit       Follow-up Information     Kelechi Duke MD. Schedule an appointment as soon as possible for a visit in 1 week.    Specialty:  Otolaryngology  Why:  For post-op visit  Contact information:  [address]  [facility] [address]  [phone]
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
## 2018-04-17 NOTE — DISCHARGE INSTRUCTIONS
Recovery After Procedural Sedation (Adult)  You have been given medicine by vein to make you sleep during your surgery. This may have included both a pain medicine and sleeping medicine. Most of the effects have worn off. But you may still have some drowsiness for the next 6 to 8 hours.  Home care  Follow these guidelines when you get home:  · For the next 8 hours, you should be watched by a responsible adult. This person should make sure your condition is not getting worse.  · Don't drink any alcohol for the next 24 hours.  · Don't drive, operate dangerous machinery, or make important business or personal decisions during the next 24 hours.  Note: Your healthcare provider may tell you not to take any medicine by mouth for pain or sleep in the next 4 hours. These medicines may react with the medicines you were given in the hospital. This could cause a much stronger response than usual.  Follow-up care  Follow up with your healthcare provider if you are not alert and back to your usual level of activity within 12 hours.  When to seek medical advice  Call your healthcare provider right away if any of these occur:  · Drowsiness gets worse  · Weakness or dizziness gets worse  · Repeated vomiting  · You can't be awakened   Date Last Reviewed: 10/18/2016  © 0902-5609 [employer]. [address] [place] [address], [place] [address]. All rights reserved. This information is not intended as a substitute for professional medical care. Always follow your healthcare professional's instructions.

## 2018-04-17 NOTE — TRANSFER OF CARE
"Anesthesia Transfer of Care Note    Patient: Mackenzie Smart    Procedure(s) Performed: Procedure(s) (LRB):  REDUCTION-CLOSED-FRACTURE-NASAL Septal (N/A)    Patient location: PACU    Anesthesia Type: general    Transport from OR: Transported from OR on room air with adequate spontaneous ventilation    Post pain: adequate analgesia    Post assessment: no apparent anesthetic complications and tolerated procedure well    Post vital signs: stable    Level of consciousness: responds to stimulation    Nausea/Vomiting: no nausea/vomiting    Complications: none    Transfer of care protocol was followed      Last vitals:   Visit Vitals  BP (!) 152/90 (BP Location: Right arm, Patient Position: Lying)   Pulse 81   Temp 36.4 °C (97.5 °F) (Temporal)   Resp 20   Ht 5' 6" (1.676 m)   Wt 54.9 kg (121 lb)   LMP 03/24/2018   SpO2 99%   Breastfeeding? No   BMI 19.53 kg/m²     "
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
## 2018-04-19 NOTE — ANESTHESIA RELEASE NOTE
"Anesthesia Release from PACU Note    Patient: Mackenzie Smart    Procedure(s) Performed: Procedure(s) (LRB):  REDUCTION-CLOSED-FRACTURE-NASAL Septal (N/A)    Anesthesia type: general    Post pain: Adequate analgesia    Post assessment: no apparent anesthetic complications and tolerated procedure well    Last Vitals:   Visit Vitals  BP (!) 142/92   Pulse 72   Temp 36.3 °C (97.3 °F) (Skin)   Resp 18   Ht 5' 6" (1.676 m)   Wt 54.9 kg (121 lb)   LMP 03/24/2018   SpO2 100%   Breastfeeding? No   BMI 19.53 kg/m²       Post vital signs: stable    Level of consciousness: awake and alert     Nausea/Vomiting: no nausea/no vomiting    Complications: none    Airway Patency: patent    Respiratory: unassisted, spontaneous ventilation, room air    Cardiovascular: stable and blood pressure at baseline    Hydration: euvolemic  "

## 2018-04-19 NOTE — ANESTHESIA POSTPROCEDURE EVALUATION
"Anesthesia Post Evaluation    Patient: Mackenzie Smart    Procedure(s) Performed: Procedure(s) (LRB):  REDUCTION-CLOSED-FRACTURE-NASAL Septal (N/A)    Final Anesthesia Type: general  Patient location during evaluation: PACU  Patient participation: Yes- Able to Participate  Level of consciousness: awake and alert  Post-procedure vital signs: reviewed and stable  Pain management: adequate  Airway patency: patent  PONV status at discharge: No PONV  Anesthetic complications: no      Cardiovascular status: hemodynamically stable  Respiratory status: unassisted, spontaneous ventilation and room air  Hydration status: euvolemic  Follow-up not needed.        Visit Vitals  BP (!) 142/92   Pulse 72   Temp 36.3 °C (97.3 °F) (Skin)   Resp 18   Ht 5' 6" (1.676 m)   Wt 54.9 kg (121 lb)   LMP 03/24/2018   SpO2 100%   Breastfeeding? No   BMI 19.53 kg/m²       Pain/Michael Score: No Data Recorded      "

## 2018-04-20 ENCOUNTER — TELEPHONE (OUTPATIENT)
Dept: OTOLARYNGOLOGY | Facility: CLINIC | Age: 23
End: 2018-04-20
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
## 2018-04-25 ENCOUNTER — OFFICE VISIT (OUTPATIENT)
Dept: OTOLARYNGOLOGY | Facility: CLINIC | Age: 23
End: 2018-04-25
Payer: MEDICAID

## 2018-04-25 VITALS
WEIGHT: 127.88 LBS | BODY MASS INDEX: 20.55 KG/M2 | DIASTOLIC BLOOD PRESSURE: 91 MMHG | HEIGHT: 66 IN | SYSTOLIC BLOOD PRESSURE: 130 MMHG | HEART RATE: 97 BPM | TEMPERATURE: 99 F

## 2018-04-25 DIAGNOSIS — S02.2XXG CLOSED FRACTURE OF NASAL BONE WITH DELAYED HEALING, SUBSEQUENT ENCOUNTER: Primary | ICD-10-CM

## 2018-04-25 PROCEDURE — 99024 POSTOP FOLLOW-UP VISIT: CPT | Mod: ,,, | Performed by: OTOLARYNGOLOGY

## 2018-04-25 PROCEDURE — 99214 OFFICE O/P EST MOD 30 MIN: CPT | Mod: PBBFAC | Performed by: OTOLARYNGOLOGY

## 2018-04-25 PROCEDURE — 99999 PR PBB SHADOW E&M-EST. PATIENT-LVL IV: CPT | Mod: PBBFAC,,, | Performed by: OTOLARYNGOLOGY

## 2018-04-25 NOTE — PROGRESS NOTES
"[facility]  Department of Otorhinolaryngology and Communication Sciences    Subjective:      Mackenzie Smart is a 23 y.o. female who presents for follow-up. She had a nasal/septal fracture, for which I performed a closed reduction last week.    She has been doing well since surgery. She reports decreased pain but relief of her nasal obstruction.    The patient's medications, allergies, past medical, surgical, social and family histories were reviewed and updated as appropriate.    A detailed review of systems was obtained with pertinent positives as per the above HPI, and otherwise negative.      Objective:     BP (!) 130/91   Pulse 97   Temp 99.1 °F (37.3 °C) (Oral)   Ht 5' 6" (1.676 m)   Wt 58 kg (127 lb 13.9 oz)   BMI 20.64 kg/m²     Constitutional: comfortable, well dressed  Psychiatric: appropriate affect  Respiratory: comfortably breathing, symmetric chest rise, no stridor  Head: normocephalic  Eyes: conjunctivae and sclerae clear  Ears: normal pinnae, normal external auditory canals, tympanic membranes intact  Nose: interval resolution of nasal bone deformities; nasal dorsum straight; minimal soft tissue edema; septum with marked decrease in deviation; mucosa pink and noncongested, no masses, no mucopurulence, no polyps      Assessment:     Mackenzie Smart is a 23 y.o. female who is doing well following closed reduction of a nasal/septal fracture.     Plan:     Reassurance was provided. I counseled her that the remnant edema should resolve--albeit gradually--over the next few months.    She will follow up with me in the future on an as-needed basis.    All questions were answered, and the patient is in agreement with the plan.    Kelechi Duke M.D.  [facility]  Department of Otorhinolaryngology and Communication Sciences  "

## 2018-05-04 ENCOUNTER — HOSPITAL ENCOUNTER (EMERGENCY)
Facility: HOSPITAL | Age: 23
Discharge: HOME | End: 2018-05-04
Attending: INTERNAL MEDICINE
Payer: MEDICAID

## 2018-05-04 VITALS
DIASTOLIC BLOOD PRESSURE: 97 MMHG | SYSTOLIC BLOOD PRESSURE: 144 MMHG | RESPIRATION RATE: 18 BRPM | HEART RATE: 78 BPM | WEIGHT: 127 LBS | TEMPERATURE: 99 F | BODY MASS INDEX: 20.41 KG/M2 | HEIGHT: 66 IN | OXYGEN SATURATION: 99 %

## 2018-05-04 DIAGNOSIS — G43.909 MIGRAINE WITHOUT STATUS MIGRAINOSUS, NOT INTRACTABLE, UNSPECIFIED MIGRAINE TYPE: Primary | ICD-10-CM

## 2018-05-04 LAB
B-HCG UR QL: NEGATIVE
CTP QC/QA: YES

## 2018-05-04 PROCEDURE — 99283 EMERGENCY DEPT VISIT LOW MDM: CPT

## 2018-05-04 PROCEDURE — 25000003 PHARM REV CODE 250: Performed by: INTERNAL MEDICINE

## 2018-05-04 PROCEDURE — 81025 URINE PREGNANCY TEST: CPT | Performed by: INTERNAL MEDICINE

## 2018-05-04 RX ORDER — ONDANSETRON 4 MG/1
4 TABLET, FILM COATED ORAL EVERY 6 HOURS PRN
Qty: 12 TABLET | Refills: 0 | Status: SHIPPED | OUTPATIENT
Start: 2018-05-04 | End: 2018-06-07

## 2018-05-04 RX ORDER — BUTALBITAL, ACETAMINOPHEN AND CAFFEINE 50; 325; 40 MG/1; MG/1; MG/1
2 TABLET ORAL
Status: COMPLETED | OUTPATIENT
Start: 2018-05-04 | End: 2018-05-04

## 2018-05-04 RX ORDER — BUTALBITAL, ACETAMINOPHEN AND CAFFEINE 50; 325; 40 MG/1; MG/1; MG/1
1 TABLET ORAL EVERY 4 HOURS PRN
Qty: 30 TABLET | Refills: 1 | Status: SHIPPED | OUTPATIENT
Start: 2018-05-04 | End: 2018-06-03

## 2018-05-04 RX ADMIN — BUTALBITAL, ACETAMINOPHEN, AND CAFFEINE 2 TABLET: 50; 325; 40 TABLET ORAL at 10:05

## 2018-05-05 NOTE — ED PROVIDER NOTES
Encounter Date: 5/4/2018       History     Chief Complaint   Patient presents with    Headache     Pt presents to ER with c/o headache for 3 weeks following a broken nose.  Pt was seen here and transferred for displaced nasal fracture and has had a migraine headache ever since.  Has been taking ibuprofen every day with minimal relief.      23-year-old female presents to the emergency department complaining of intermittent headaches ×3 weeks since undergoing repair of a nasal bone fracture.  She states she has sensitivity to light and loud noises.  She has taken ibuprofen without improvement of headaches.  She denies fevers/chills and also denies vomiting.  Headaches are sometimes accompanied by nausea, but patient is not nauseated at this time.      The history is provided by the patient. No  was used.   Headache    This is a recurrent problem. The current episode started 1 to 4 weeks ago. The problem occurs intermittently. The problem has been unchanged. The pain is located in the bilateral region. The pain quality is similar to prior headaches. The quality of the pain is described as aching. The pain is at a severity of 5/10. Associated symptoms include nausea, phonophobia and photophobia. Pertinent negatives include no abdominal pain, abnormal behavior, back pain, coughing, fever, loss of balance, neck pain, seizures, sore throat, visual change, vomiting or weakness. The symptoms are aggravated by bright light and noise.     Review of patient's allergies indicates:  No Known Allergies  Past Medical History:   Diagnosis Date    Asthma     Blood clot associated with vein wall inflammation      Past Surgical History:   Procedure Laterality Date    CHOLECYSTECTOMY  2010    VAGINAL DELIVERY      X 2     Family History   Problem Relation Age of Onset    Hypertension Mother     No Known Problems Brother     No Known Problems Brother     No Known Problems Brother     No Known Problems Father      No Known Problems Sister     No Known Problems Maternal Aunt     No Known Problems Maternal Uncle     No Known Problems Paternal Aunt     No Known Problems Paternal Uncle     No Known Problems Maternal Grandmother     No Known Problems Maternal Grandfather     No Known Problems Paternal Grandmother     No Known Problems Paternal Grandfather     Breast cancer Neg Hx     Colon cancer Neg Hx     Ovarian cancer Neg Hx     Amblyopia Neg Hx     Blindness Neg Hx     Cancer Neg Hx     Cataracts Neg Hx     Diabetes Neg Hx     Glaucoma Neg Hx     Macular degeneration Neg Hx     Retinal detachment Neg Hx     Strabismus Neg Hx     Stroke Neg Hx     Thyroid disease Neg Hx      Social History   Substance Use Topics    Smoking status: Never Smoker    Smokeless tobacco: Never Used    Alcohol use No     Review of Systems   Constitutional: Negative for fever.   HENT: Negative for sore throat.    Eyes: Positive for photophobia.   Respiratory: Negative for cough.    Gastrointestinal: Positive for nausea. Negative for abdominal pain and vomiting.   Musculoskeletal: Negative for back pain and neck pain.   Neurological: Positive for headaches. Negative for seizures, weakness and loss of balance.   All other systems reviewed and are negative.      Physical Exam     Initial Vitals [05/04/18 2136]   BP Pulse Resp Temp SpO2   (!) 141/92 79 18 98.6 °F (37 °C) 99 %      MAP       108.33         Physical Exam    Nursing note and vitals reviewed.  Constitutional: She appears well-developed and well-nourished. No distress.   HENT:   Head: Normocephalic and atraumatic.   Right Ear: External ear normal.   Left Ear: External ear normal.   Eyes: Conjunctivae and EOM are normal.   Neck: Normal range of motion. Neck supple.   Cardiovascular: Normal rate and regular rhythm.   Pulmonary/Chest: Breath sounds normal. No respiratory distress.   Abdominal: Soft. Bowel sounds are normal.   Musculoskeletal: Normal range of motion.    Neurological: She is alert and oriented to person, place, and time. She has normal strength. No cranial nerve deficit or sensory deficit.   Skin: Skin is warm and dry.   Psychiatric: She has a normal mood and affect. Thought content normal.         ED Course   Procedures  Labs Reviewed   POCT URINE PREGNANCY             Medical Decision Making:   Initial Assessment:   23-year-old female presents to the emergency department complaining of intermittent headaches ×3 weeks since undergoing repair of a nasal bone fracture.  She states she has sensitivity to light and loud noises.  She has taken ibuprofen without improvement of headaches.  She denies fevers/chills and also denies vomiting.  Headaches are sometimes accompanied by nausea, but patient is not nauseated at this time.    ED Management:  Fioricet was given in ED and Rx's for fioricet and zofran were given as well as instructions for migraine. She was advised to f/u with PCP in 3 days for reevaluation.                      Clinical Impression:   The encounter diagnosis was Migraine without status migrainosus, not intractable, unspecified migraine type.    Disposition:   Disposition: Discharged  Condition: Stable                        Lauri Marinelli MD  05/04/18 0751
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
## 2018-05-07 ENCOUNTER — TELEPHONE (OUTPATIENT)
Dept: OTOLARYNGOLOGY | Facility: CLINIC | Age: 23
End: 2018-05-07

## 2018-05-07 NOTE — TELEPHONE ENCOUNTER
----- Message from Leticia Weathers sent at 5/7/2018  3:09 PM CDT -----  Contact: patient  Please call above patient need appointment with the doctor

## 2018-05-08 NOTE — PROGRESS NOTES
[facility]  Department of Otorhinolaryngology and Communication Sciences    Subjective:      Mackenzie Smart is a 23 y.o. female who presents for follow-up. She had a nasal/septal fracture, for which I recently performed a closed reduction.    Since her last visit, she is having less pain. She returns today because her nose is not exactly the way it was prior to the injury. She frequently analyzes it and this is contributing to negative self-image. She also notes ongoing subtotal left-sided nasal obstruction. These symptoms are negatively impacting all aspects of her life.    The patient's medications, allergies, past medical, surgical, social and family histories were reviewed and updated as appropriate.    A detailed review of systems was obtained with pertinent positives as per the above HPI, and otherwise negative.      Objective:     BP (!) 134/92   Pulse 61   Temp 99.4 °F (37.4 °C)   Wt 56.7 kg (125 lb)   BMI 20.18 kg/m²     Constitutional: comfortable, well dressed  Psychiatric: appropriate affect  Respiratory: comfortably breathing, symmetric chest rise, no stridor  Head: normocephalic  Eyes: conjunctivae and sclerae clear  Ears: normal pinnae, normal external auditory canals, tympanic membranes intact  Nose: durable resolution of nasal bone deformities; nasal dorsum straight, though with slight linear deviation off the midline; minimal soft tissue edema; septum with durable decrease in the degree of deviation, though with persistent, stable leftward mid-vault septal spur    Assessment:     Mackenzie Smart is a 23 y.o. female with persistent cosmetic deformity and obstructive nasal symptoms following closed reduction of a significantly displaced nasal/septal fracture.     Plan:     Reassurance was provided about the interval, durable improvements since the closed reduction. She continues to be bothered by the persistent deformity from her baseline appearance. She would like to meet with a facial  reconstructive surgeon to learn whether she meets indications for revision surgery (open vs. closed), and the appropriate timing thereof.    She will follow up with me in the future on an as-needed basis.    All questions were answered, and the patient is in agreement with the plan.    Kelechi Duke M.D.  [facility]  Department of Otorhinolaryngology and Communication Sciences

## 2018-05-09 ENCOUNTER — OFFICE VISIT (OUTPATIENT)
Dept: OTOLARYNGOLOGY | Facility: CLINIC | Age: 23
End: 2018-05-09
Payer: MEDICAID

## 2018-05-09 VITALS
BODY MASS INDEX: 20.18 KG/M2 | SYSTOLIC BLOOD PRESSURE: 134 MMHG | HEART RATE: 61 BPM | WEIGHT: 125 LBS | TEMPERATURE: 99 F | DIASTOLIC BLOOD PRESSURE: 92 MMHG

## 2018-05-09 DIAGNOSIS — S02.2XXD CLOSED FRACTURE OF NASAL BONE WITH ROUTINE HEALING, SUBSEQUENT ENCOUNTER: Primary | ICD-10-CM

## 2018-05-09 DIAGNOSIS — J34.89 NASAL OBSTRUCTION: ICD-10-CM

## 2018-05-09 PROCEDURE — 99999 PR PBB SHADOW E&M-EST. PATIENT-LVL III: CPT | Mod: PBBFAC,,, | Performed by: OTOLARYNGOLOGY

## 2018-05-09 PROCEDURE — 99024 POSTOP FOLLOW-UP VISIT: CPT | Mod: ,,, | Performed by: OTOLARYNGOLOGY

## 2018-05-09 PROCEDURE — 99213 OFFICE O/P EST LOW 20 MIN: CPT | Mod: PBBFAC | Performed by: OTOLARYNGOLOGY

## 2018-05-11 ENCOUNTER — OFFICE VISIT (OUTPATIENT)
Dept: OTOLARYNGOLOGY | Facility: CLINIC | Age: 23
End: 2018-05-11
Payer: MEDICAID

## 2018-05-11 VITALS — SYSTOLIC BLOOD PRESSURE: 136 MMHG | HEART RATE: 79 BPM | DIASTOLIC BLOOD PRESSURE: 91 MMHG | TEMPERATURE: 98 F

## 2018-05-11 DIAGNOSIS — J34.89 NASAL OBSTRUCTION: Primary | ICD-10-CM

## 2018-05-11 DIAGNOSIS — J34.3 NASAL TURBINATE HYPERTROPHY: ICD-10-CM

## 2018-05-11 DIAGNOSIS — G43.909 MIGRAINE WITHOUT STATUS MIGRAINOSUS, NOT INTRACTABLE, UNSPECIFIED MIGRAINE TYPE: ICD-10-CM

## 2018-05-11 DIAGNOSIS — J34.2 NASAL SEPTAL DEVIATION: ICD-10-CM

## 2018-05-11 DIAGNOSIS — S02.2XXD CLOSED FRACTURE OF NASAL BONE WITH ROUTINE HEALING, SUBSEQUENT ENCOUNTER: ICD-10-CM

## 2018-05-11 DIAGNOSIS — J34.89 NASAL VALVE STENOSIS: ICD-10-CM

## 2018-05-11 PROCEDURE — 31231 NASAL ENDOSCOPY DX: CPT | Mod: PBBFAC | Performed by: OTOLARYNGOLOGY

## 2018-05-11 PROCEDURE — 99999 PR PBB SHADOW E&M-EST. PATIENT-LVL IV: CPT | Mod: PBBFAC,,, | Performed by: OTOLARYNGOLOGY

## 2018-05-11 PROCEDURE — 99214 OFFICE O/P EST MOD 30 MIN: CPT | Mod: PBBFAC,25 | Performed by: OTOLARYNGOLOGY

## 2018-05-11 PROCEDURE — 99214 OFFICE O/P EST MOD 30 MIN: CPT | Mod: 25,24,S$PBB, | Performed by: OTOLARYNGOLOGY

## 2018-05-11 PROCEDURE — 31231 NASAL ENDOSCOPY DX: CPT | Mod: 79,S$PBB,, | Performed by: OTOLARYNGOLOGY
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
## 2018-05-28 ENCOUNTER — TELEPHONE (OUTPATIENT)
Dept: OTOLARYNGOLOGY | Facility: CLINIC | Age: 23
End: 2018-05-28

## 2018-05-29 ENCOUNTER — ANESTHESIA (OUTPATIENT)
Dept: SURGERY | Facility: HOSPITAL | Age: 23
End: 2018-05-29
Payer: MEDICAID

## 2018-05-29 ENCOUNTER — HOSPITAL ENCOUNTER (OUTPATIENT)
Facility: HOSPITAL | Age: 23
Discharge: HOME | End: 2018-05-29
Attending: OTOLARYNGOLOGY | Admitting: OTOLARYNGOLOGY
Payer: MEDICAID

## 2018-05-29 ENCOUNTER — ANESTHESIA EVENT (OUTPATIENT)
Dept: SURGERY | Facility: HOSPITAL | Age: 23
End: 2018-05-29
Payer: MEDICAID

## 2018-05-29 VITALS
HEART RATE: 66 BPM | WEIGHT: 132 LBS | BODY MASS INDEX: 21.21 KG/M2 | DIASTOLIC BLOOD PRESSURE: 95 MMHG | OXYGEN SATURATION: 100 % | SYSTOLIC BLOOD PRESSURE: 135 MMHG | RESPIRATION RATE: 16 BRPM | TEMPERATURE: 98 F | HEIGHT: 66 IN

## 2018-05-29 DIAGNOSIS — J34.89 NASAL OBSTRUCTION: Primary | ICD-10-CM

## 2018-05-29 LAB
B-HCG UR QL: NEGATIVE
CTP QC/QA: YES

## 2018-05-29 PROCEDURE — 25000003 PHARM REV CODE 250: Performed by: NURSE ANESTHETIST, CERTIFIED REGISTERED

## 2018-05-29 PROCEDURE — 36000706: Performed by: OTOLARYNGOLOGY

## 2018-05-29 PROCEDURE — 25000003 PHARM REV CODE 250: Performed by: OTOLARYNGOLOGY

## 2018-05-29 PROCEDURE — 63600175 PHARM REV CODE 636 W HCPCS: Performed by: NURSE ANESTHETIST, CERTIFIED REGISTERED

## 2018-05-29 PROCEDURE — 37000008 HC ANESTHESIA 1ST 15 MINUTES: Performed by: OTOLARYNGOLOGY

## 2018-05-29 PROCEDURE — 25000003 PHARM REV CODE 250: Performed by: ANESTHESIOLOGY

## 2018-05-29 PROCEDURE — 27000221 HC OXYGEN, UP TO 24 HOURS

## 2018-05-29 PROCEDURE — 37000009 HC ANESTHESIA EA ADD 15 MINS: Performed by: OTOLARYNGOLOGY

## 2018-05-29 PROCEDURE — 36000707: Performed by: OTOLARYNGOLOGY

## 2018-05-29 PROCEDURE — 30520 REPAIR OF NASAL SEPTUM: CPT | Mod: 51,58,, | Performed by: OTOLARYNGOLOGY

## 2018-05-29 PROCEDURE — 71000039 HC RECOVERY, EACH ADD'L HOUR: Performed by: OTOLARYNGOLOGY

## 2018-05-29 PROCEDURE — 71000016 HC POSTOP RECOV ADDL HR: Performed by: OTOLARYNGOLOGY

## 2018-05-29 PROCEDURE — 71000015 HC POSTOP RECOV 1ST HR: Performed by: OTOLARYNGOLOGY

## 2018-05-29 PROCEDURE — D9220A PRA ANESTHESIA: Mod: CRNA,,, | Performed by: NURSE ANESTHETIST, CERTIFIED REGISTERED

## 2018-05-29 PROCEDURE — 71000033 HC RECOVERY, INTIAL HOUR: Performed by: OTOLARYNGOLOGY

## 2018-05-29 PROCEDURE — 00160 ANES PX NOSE&SINUS NOS: CPT | Performed by: OTOLARYNGOLOGY

## 2018-05-29 PROCEDURE — 63600175 PHARM REV CODE 636 W HCPCS: Performed by: ANESTHESIOLOGY

## 2018-05-29 PROCEDURE — 81025 URINE PREGNANCY TEST: CPT | Performed by: OTOLARYNGOLOGY

## 2018-05-29 PROCEDURE — 30465 REPAIR NASAL STENOSIS: CPT | Mod: 58,,, | Performed by: OTOLARYNGOLOGY

## 2018-05-29 PROCEDURE — D9220A PRA ANESTHESIA: Mod: ANES,,, | Performed by: ANESTHESIOLOGY

## 2018-05-29 PROCEDURE — 30140 RESECT INFERIOR TURBINATE: CPT | Mod: 51,58,50, | Performed by: OTOLARYNGOLOGY

## 2018-05-29 PROCEDURE — 21335 OPEN TX NOSE & SEPTAL FX: CPT | Mod: 51,58,, | Performed by: OTOLARYNGOLOGY

## 2018-05-29 RX ORDER — DEXAMETHASONE SODIUM PHOSPHATE 4 MG/ML
INJECTION, SOLUTION INTRA-ARTICULAR; INTRALESIONAL; INTRAMUSCULAR; INTRAVENOUS; SOFT TISSUE
Status: DISCONTINUED | OUTPATIENT
Start: 2018-05-29 | End: 2018-05-29

## 2018-05-29 RX ORDER — SODIUM CHLORIDE 0.9 % (FLUSH) 0.9 %
3 SYRINGE (ML) INJECTION
Status: DISCONTINUED | OUTPATIENT
Start: 2018-05-29 | End: 2018-05-29 | Stop reason: HOSPADM

## 2018-05-29 RX ORDER — OXYCODONE AND ACETAMINOPHEN 5; 325 MG/1; MG/1
1 TABLET ORAL EVERY 6 HOURS PRN
Status: DISCONTINUED | OUTPATIENT
Start: 2018-05-29 | End: 2018-05-29 | Stop reason: HOSPADM

## 2018-05-29 RX ORDER — SODIUM CHLORIDE 9 MG/ML
INJECTION, SOLUTION INTRAVENOUS CONTINUOUS
Status: DISCONTINUED | OUTPATIENT
Start: 2018-05-29 | End: 2018-05-29 | Stop reason: HOSPADM

## 2018-05-29 RX ORDER — LIDOCAINE HYDROCHLORIDE 10 MG/ML
1 INJECTION, SOLUTION EPIDURAL; INFILTRATION; INTRACAUDAL; PERINEURAL ONCE
Status: COMPLETED | OUTPATIENT
Start: 2018-05-29 | End: 2018-05-29

## 2018-05-29 RX ORDER — GLYCOPYRROLATE 0.2 MG/ML
INJECTION INTRAMUSCULAR; INTRAVENOUS
Status: DISCONTINUED | OUTPATIENT
Start: 2018-05-29 | End: 2018-05-29

## 2018-05-29 RX ORDER — PROPOFOL 10 MG/ML
VIAL (ML) INTRAVENOUS
Status: DISCONTINUED | OUTPATIENT
Start: 2018-05-29 | End: 2018-05-29

## 2018-05-29 RX ORDER — ROCURONIUM BROMIDE 10 MG/ML
INJECTION, SOLUTION INTRAVENOUS
Status: DISCONTINUED | OUTPATIENT
Start: 2018-05-29 | End: 2018-05-29

## 2018-05-29 RX ORDER — ESMOLOL HYDROCHLORIDE 10 MG/ML
INJECTION INTRAVENOUS
Status: DISCONTINUED | OUTPATIENT
Start: 2018-05-29 | End: 2018-05-29

## 2018-05-29 RX ORDER — OXYMETAZOLINE HCL 0.05 %
SPRAY, NON-AEROSOL (ML) NASAL
Status: DISCONTINUED | OUTPATIENT
Start: 2018-05-29 | End: 2018-05-29 | Stop reason: HOSPADM

## 2018-05-29 RX ORDER — NEOSTIGMINE METHYLSULFATE 1 MG/ML
INJECTION, SOLUTION INTRAVENOUS
Status: DISCONTINUED | OUTPATIENT
Start: 2018-05-29 | End: 2018-05-29

## 2018-05-29 RX ORDER — FENTANYL CITRATE 50 UG/ML
25 INJECTION, SOLUTION INTRAMUSCULAR; INTRAVENOUS EVERY 5 MIN PRN
Status: COMPLETED | OUTPATIENT
Start: 2018-05-29 | End: 2018-05-29

## 2018-05-29 RX ORDER — CEFAZOLIN SODIUM 1 G/3ML
INJECTION, POWDER, FOR SOLUTION INTRAMUSCULAR; INTRAVENOUS
Status: DISCONTINUED | OUTPATIENT
Start: 2018-05-29 | End: 2018-05-29

## 2018-05-29 RX ORDER — OXYCODONE AND ACETAMINOPHEN 5; 325 MG/1; MG/1
1 TABLET ORAL EVERY 6 HOURS PRN
Qty: 28 TABLET | Refills: 0 | OUTPATIENT
Start: 2018-05-29

## 2018-05-29 RX ORDER — CEPHALEXIN 500 MG/1
500 CAPSULE ORAL EVERY 8 HOURS
Qty: 30 CAPSULE | Refills: 0 | Status: SHIPPED | OUTPATIENT
Start: 2018-05-29 | End: 2018-06-08

## 2018-05-29 RX ORDER — DEXMEDETOMIDINE HYDROCHLORIDE 100 UG/ML
INJECTION, SOLUTION INTRAVENOUS
Status: DISCONTINUED | OUTPATIENT
Start: 2018-05-29 | End: 2018-05-29

## 2018-05-29 RX ORDER — CEPHALEXIN 500 MG/1
500 CAPSULE ORAL EVERY 8 HOURS
Qty: 30 CAPSULE | Refills: 0 | Status: SHIPPED | OUTPATIENT
Start: 2018-05-29 | End: 2018-05-29

## 2018-05-29 RX ORDER — MIDAZOLAM HYDROCHLORIDE 1 MG/ML
INJECTION, SOLUTION INTRAMUSCULAR; INTRAVENOUS
Status: DISCONTINUED | OUTPATIENT
Start: 2018-05-29 | End: 2018-05-29

## 2018-05-29 RX ORDER — ACETAMINOPHEN 325 MG/1
975 TABLET ORAL ONCE
Status: COMPLETED | OUTPATIENT
Start: 2018-05-29 | End: 2018-05-29

## 2018-05-29 RX ORDER — LIDOCAINE HYDROCHLORIDE AND EPINEPHRINE 10; 10 MG/ML; UG/ML
INJECTION, SOLUTION INFILTRATION; PERINEURAL
Status: DISCONTINUED | OUTPATIENT
Start: 2018-05-29 | End: 2018-05-29 | Stop reason: HOSPADM

## 2018-05-29 RX ORDER — OXYCODONE AND ACETAMINOPHEN 5; 325 MG/1; MG/1
1 TABLET ORAL EVERY 6 HOURS PRN
Qty: 28 TABLET | Refills: 0 | Status: SHIPPED | OUTPATIENT
Start: 2018-05-29 | End: 2019-08-31

## 2018-05-29 RX ORDER — KETAMINE HCL IN 0.9 % NACL 50 MG/5 ML
SYRINGE (ML) INTRAVENOUS
Status: DISCONTINUED | OUTPATIENT
Start: 2018-05-29 | End: 2018-05-29

## 2018-05-29 RX ORDER — CEPHALEXIN 500 MG/1
500 CAPSULE ORAL EVERY 8 HOURS
Qty: 30 CAPSULE | Refills: 0 | OUTPATIENT
Start: 2018-05-29 | End: 2018-06-08

## 2018-05-29 RX ORDER — LIDOCAINE HCL/PF 100 MG/5ML
SYRINGE (ML) INTRAVENOUS
Status: DISCONTINUED | OUTPATIENT
Start: 2018-05-29 | End: 2018-05-29

## 2018-05-29 RX ORDER — FENTANYL CITRATE 50 UG/ML
INJECTION, SOLUTION INTRAMUSCULAR; INTRAVENOUS
Status: DISCONTINUED | OUTPATIENT
Start: 2018-05-29 | End: 2018-05-29

## 2018-05-29 RX ORDER — OXYCODONE AND ACETAMINOPHEN 5; 325 MG/1; MG/1
1 TABLET ORAL EVERY 6 HOURS PRN
Qty: 28 TABLET | Refills: 0 | Status: SHIPPED | OUTPATIENT
Start: 2018-05-29 | End: 2018-05-29

## 2018-05-29 RX ORDER — BACITRACIN ZINC 500 UNIT/G
OINTMENT (GRAM) TOPICAL
Status: DISCONTINUED | OUTPATIENT
Start: 2018-05-29 | End: 2018-05-29 | Stop reason: HOSPADM

## 2018-05-29 RX ADMIN — ROCURONIUM BROMIDE 30 MG: 10 INJECTION, SOLUTION INTRAVENOUS at 01:05

## 2018-05-29 RX ADMIN — SODIUM CHLORIDE, SODIUM GLUCONATE, SODIUM ACETATE, POTASSIUM CHLORIDE, MAGNESIUM CHLORIDE, SODIUM PHOSPHATE, DIBASIC, AND POTASSIUM PHOSPHATE: .53; .5; .37; .037; .03; .012; .00082 INJECTION, SOLUTION INTRAVENOUS at 02:05

## 2018-05-29 RX ADMIN — FENTANYL CITRATE 25 MCG: 50 INJECTION, SOLUTION INTRAMUSCULAR; INTRAVENOUS at 05:05

## 2018-05-29 RX ADMIN — ACETAMINOPHEN 975 MG: 325 TABLET ORAL at 11:05

## 2018-05-29 RX ADMIN — GLYCOPYRROLATE 0.4 MG: 0.2 INJECTION, SOLUTION INTRAMUSCULAR; INTRAVENOUS at 03:05

## 2018-05-29 RX ADMIN — OXYCODONE HYDROCHLORIDE AND ACETAMINOPHEN 1 TABLET: 5; 325 TABLET ORAL at 04:05

## 2018-05-29 RX ADMIN — Medication 20 MG: at 02:05

## 2018-05-29 RX ADMIN — FENTANYL CITRATE 25 MCG: 50 INJECTION, SOLUTION INTRAMUSCULAR; INTRAVENOUS at 04:05

## 2018-05-29 RX ADMIN — PROPOFOL 30 MG: 10 INJECTION, EMULSION INTRAVENOUS at 01:05

## 2018-05-29 RX ADMIN — FENTANYL CITRATE 75 MCG: 50 INJECTION, SOLUTION INTRAMUSCULAR; INTRAVENOUS at 02:05

## 2018-05-29 RX ADMIN — SODIUM CHLORIDE: 0.9 INJECTION, SOLUTION INTRAVENOUS at 11:05

## 2018-05-29 RX ADMIN — FENTANYL CITRATE 75 MCG: 50 INJECTION, SOLUTION INTRAMUSCULAR; INTRAVENOUS at 01:05

## 2018-05-29 RX ADMIN — PROPOFOL 140 MG: 10 INJECTION, EMULSION INTRAVENOUS at 01:05

## 2018-05-29 RX ADMIN — DEXMEDETOMIDINE HYDROCHLORIDE 4 MCG: 100 INJECTION, SOLUTION, CONCENTRATE INTRAVENOUS at 02:05

## 2018-05-29 RX ADMIN — FENTANYL CITRATE 25 MCG: 50 INJECTION, SOLUTION INTRAMUSCULAR; INTRAVENOUS at 03:05

## 2018-05-29 RX ADMIN — SODIUM CHLORIDE: 0.9 INJECTION, SOLUTION INTRAVENOUS at 01:05

## 2018-05-29 RX ADMIN — ESMOLOL HYDROCHLORIDE 20 MG: 10 INJECTION INTRAVENOUS at 02:05

## 2018-05-29 RX ADMIN — LIDOCAINE HYDROCHLORIDE 50 MG: 20 INJECTION, SOLUTION INTRAVENOUS at 01:05

## 2018-05-29 RX ADMIN — MIDAZOLAM HYDROCHLORIDE 2 MG: 1 INJECTION, SOLUTION INTRAMUSCULAR; INTRAVENOUS at 01:05

## 2018-05-29 RX ADMIN — DEXAMETHASONE SODIUM PHOSPHATE 12 MG: 4 INJECTION, SOLUTION INTRAMUSCULAR; INTRAVENOUS at 01:05

## 2018-05-29 RX ADMIN — NEOSTIGMINE METHYLSULFATE 3 MG: 1 INJECTION INTRAVENOUS at 03:05

## 2018-05-29 RX ADMIN — CEFAZOLIN 2 G: 330 INJECTION, POWDER, FOR SOLUTION INTRAMUSCULAR; INTRAVENOUS at 02:05

## 2018-05-29 RX ADMIN — LIDOCAINE HYDROCHLORIDE 0.1 MG: 10 INJECTION, SOLUTION EPIDURAL; INFILTRATION; INTRACAUDAL; PERINEURAL at 11:05
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
## 2018-05-29 NOTE — DISCHARGE INSTRUCTIONS
Discharge Instructions for Rhinoplasty  You had a surgical procedure called rhinoplasty, which changes the structure and shape of the nose. Heres what you need to know following this procedure.  What to expect  · Bleeding may occur after surgery. It should subside in about 4 to 7 days.  · Swelling may occur around your outer nose. It should subside over the next 1 to 2 weeks.  · Expect your nose to be blocked during the first week after surgery. This is caused by blood, mucus, and swelling inside the nose.  · Dark coloration or bruising may appear around the eyes. It should subside over the next 1 to 2 weeks.  Nose care  · Avoid blowing your nose for 1 week(s).  · Do not insert any objects, such as cotton swabs, in your nose, not even for cleaning.  · Clean the outer part of the nostrils with hydrogen peroxide. You may apply antibiotic ointment or petroleum jelly to the outer nostrils.  · Do not apply ice to the area unless your health care provider says its OK.  · Minimize sun exposure to the nose for at least 12 months. Always use a sunscreen with a high SPF when going out in the sun.  · Avoid trauma to your nose. Try not to bend over, bump your nose, or roll onto your face while asleep.  · Avoid wearing glasses of any kind or resting anything on your nose for 2 months after surgery.  Other home care  · Take your medication exactly as directed.  · Do not take aspirin unless your health care provider says its OK.  · Keep your head elevated when you rest or sleep for 7 days after surgery to keep swelling to a minimum.  · Try to rest quietly for the next 2 to 3 weeks. Avoid any activity that increases your blood pressure or pulse rate, because this could cause bleeding.  · You may shower or bathe after surgery, but be careful if you have an external cast on your nose. Casts should remain dry. So before taking a shower, make sure that the water flow does not hit your face. Adjust the showerhead if  necessary.  Follow-up  Make a follow-up appointment.  When to call your health care provider  Call your health care provider right away if you have any of the following:  · Increasing pain or severe pain that is not relieved by medication  · Fever of 100.4°F (38°C) or higher  · Yellow or green discharge from the wound  · Increasing redness around the incision lines  · Nose bleeding that does not slow down in 4 to 7 days  · Accidental bump of your nose   Date Last Reviewed: 4/20/2015 © 2000-2017 [employer]. [address] [place] [address]. All rights reserved. This information is not intended as a substitute for professional medical care. Always follow your healthcare professional's instructions.        Nasal Surgery: Septoplasty    Youre scheduled to have nasal surgery. The type of nasal surgery youre having is called septoplasty. Read on to learn more about what to expect during this surgery.During surgery, the surgeon may remove cartilage and bone to reshape the deviated septum. After surgery, there is more breathing space. Enough cartilage and bone remain to give the nose support.  What to expect during septoplasty  This surgery repairs a blockage inside the nose caused by a deviated septum. With a deviated septum, there is a problem with the wall that divides the nose into two chambers. A deviated septum may block air coming through one or both nostrils. This makes it harder for you to breathe through your nose. During septoplasty, the surgeon makes incisions inside the nose. Then the surgeon trims, reshapes, moves, or removes cartilage and sometimes bone from the septum.  Risks and possible complications  As with any surgery, nasal surgery has some risks. These include a slight risk of bleeding and infection. Your doctor will discuss any other risks and complications with you.   After septoplasty  After septoplasty, youll be taken to a recovery area or to your hospital room. Your  experience may be as follows:  · You may have packing material inside your nose. This reduces bleeding and promotes healing. You may also have bandages (dressings) on the outside of your nose.  · Its normal to have some mucus and blood drain from your nose. Until packing is removed, you may have to breathe through your mouth.  · You may have some swelling or bruising around the eyelids if a rhinoplasty was also done.  · Expect some throat dryness and irritation.  · Pain medicine will be prescribed as needed. Dont take medicine that contains aspirin or ibuprofen. These can cause increased bleeding.  Follow-up care  Youll need to follow up with your doctor within a week after your surgery. Here is what to expect:  · Any packing, splint, or dressings will probably be removed. You may feel slight discomfort and bleed a little when this is done.  · After the splint or packing is removed, youll most likely breathe better than you did before surgery.  · You may have minor numbness, pain, swelling, and a little stiffness under the tip of the nose.  · In a few days, the inside of your nose may swell and briefly block your breathing. Or a scab or crust may block breathing for a short time. Leave the scab alone. Your doctor can remove it. Using saline (irrigation or aerosol) regularly after surgery helps to reduce the amount of crusting at each visit.  · Contact your healthcare provider if you have any questions or concerns.  Date Last Reviewed: 11/1/2016  © 3688-1812 [employer] [address] [place] [address], [place] [address]. All rights reserved. This information is not intended as a substitute for professional medical care. Always follow your healthcare professional's instructions.        Recovery After Procedural Sedation (Adult)  You have been given medicine by vein to make you sleep during your surgery. This may have included both a pain medicine and sleeping medicine. Most of the effects have worn off. But  you may still have some drowsiness for the next 6 to 8 hours.  Home care  Follow these guidelines when you get home:  · For the next 8 hours, you should be watched by a responsible adult. This person should make sure your condition is not getting worse.  · Don't drink any alcohol for the next 24 hours.  · Don't drive, operate dangerous machinery, or make important business or personal decisions during the next 24 hours.  Note: Your healthcare provider may tell you not to take any medicine by mouth for pain or sleep in the next 4 hours. These medicines may react with the medicines you were given in the hospital. This could cause a much stronger response than usual.  Follow-up care  Follow up with your healthcare provider if you are not alert and back to your usual level of activity within 12 hours.  When to seek medical advice  Call your healthcare provider right away if any of these occur:  · Drowsiness gets worse  · Weakness or dizziness gets worse  · Repeated vomiting  · You can't be awakened   Date Last Reviewed: 10/18/2016  © 9406-3922 [employer]. [address] [place] [address], [place] [address]. All rights reserved. This information is not intended as a substitute for professional medical care. Always follow your healthcare professional's instructions.

## 2018-05-29 NOTE — PLAN OF CARE
Patient arrived from PACU with LUIS Benitez RN.  Patient stable.  Report received at this time.  Assumed care of patient at this time.
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
## 2018-05-29 NOTE — PLAN OF CARE
Pt pre op completed, surgery consent pending, anesthesia consent verified and paced in chart, clothing to be placed in locker- no family available to taker her belongings. UPT negative. Pt complaining of migraine, with Dr. Kay (anesthesia) notified- states to administer 975 mg of PO tylenol now for migraine, pt updated on plan of care.

## 2018-05-29 NOTE — BRIEF OP NOTE
[facility]  Brief Operative Note     SUMMARY     Surgery Date: 5/29/2018     Surgeon(s) and Role:     * Zack Waters MD - Resident - Assisting     * Hong Moyer MD - Resident - Assisting     * Yao Briggs MD - Primary        Pre-op Diagnosis:  Nasal obstruction [J34.89]  Nasal turbinate hypertrophy [J34.3]  Nasal septal deviation [J34.2]  Nasal valve stenosis [J34.89]  Closed fracture of nasal bone with routine healing, subsequent encounter [S02.2XXD]    Post-op Diagnosis:  Post-Op Diagnosis Codes:     * Nasal obstruction [J34.89]     * Nasal turbinate hypertrophy [J34.3]     * Nasal septal deviation [J34.2]     * Nasal valve stenosis [J34.89]     * Closed fracture of nasal bone with routine healing, subsequent encounter [S02.2XXD]    Procedure(s) (LRB):  RECONSTRUCTION-NASAL (Bilateral)  Septoplasty  Inferior Turbinate reduction   Nasal reconstruction with left endonasal  graft   Septal cartilage graft   Anesthesia: General        Findings/Key Components: see full operative note     Estimated Blood Loss: 15cc         Specimens:   Specimen (12h ago through future)    None          Discharge Note    SUMMARY     Admit Date: 5/29/2018    Discharge Date and Time:  05/29/2018 3:44 PM    Hospital Course Following completion of an electively scheduled procedure, the patient was transferred to the PACU for postoperative monitoring. The post operative course was uneventful and noted for adequate pain control and PO intake following surgery. The patient is discharged home in good condition and will follow-up with Dr. Yao Briggs MD                   Final Diagnosis: Post-Op Diagnosis Codes:     * Nasal obstruction [J34.89]     * Nasal turbinate hypertrophy [J34.3]     * Nasal septal deviation [J34.2]     * Nasal valve stenosis [J34.89]     * Closed fracture of nasal bone with routine healing, subsequent encounter [S02.2XXD]    Disposition: Home or Self Care    Follow Up/Patient  Instructions:     Medications:  Reconciled Home Medications:      Medication List      START taking these medications    cephALEXin 500 MG capsule  Commonly known as:  KEFLEX  Take 1 capsule (500 mg total) by mouth every 8 (eight) hours.     oxyCODONE-acetaminophen 5-325 mg per tablet  Commonly known as:  PERCOCET  Take 1 tablet by mouth every 6 (six) hours as needed.        CONTINUE taking these medications    butalbital-acetaminophen-caffeine -40 mg -40 mg per tablet  Commonly known as:  FIORICET, ESGIC  Take 1 tablet by mouth every 4 (four) hours as needed for Pain.     ondansetron 4 MG tablet  Commonly known as:  ZOFRAN  Take 1 tablet (4 mg total) by mouth every 6 (six) hours as needed for Nausea (for nausea).            Discharge Procedure Orders  Diet Adult Regular     Other restrictions (specify):   Order Comments: No heavy lifting, no nose-blowing, no nose-picking, no abdominal straining x two weeks. Take care to sneeze, where possible, with mouth open. Showers okay, but no baths, no immersion. Mustache dressing may be changed prn saturation and removed when drainage ceases. MD to remove splints at office visit. Please return to ED should you experience any excessive bleeding, fevers, chills, foul-smelling discharge, or any concerning change in your health.     Leave dressing on - Keep it clean, dry, and intact until clinic visit       Follow-up Information     Yao Briggs MD In 1 week.    Specialty:  Otolaryngology  Why:  For suture removal, For wound re-check  Contact information:  Thiago FELIPE SCOTT  [facility] [id number] [phone]

## 2018-05-29 NOTE — ANESTHESIA PREPROCEDURE EVALUATION
05/29/2018  Mackenzie Smart is a 23 y.o., female.  No problems with previous anesthesia.  Hx of exercise induced asthma, no recent flares.    Past Medical History:   Diagnosis Date    Asthma     Blood clot associated with vein wall inflammation     History of broken nose      Past Surgical History:   Procedure Laterality Date    CHOLECYSTECTOMY  2010    VAGINAL DELIVERY      X 2     Review of patient's allergies indicates:  No Known Allergies  No current facility-administered medications on file prior to encounter.      Current Outpatient Prescriptions on File Prior to Encounter   Medication Sig Dispense Refill    butalbital-acetaminophen-caffeine -40 mg (FIORICET, ESGIC) -40 mg per tablet Take 1 tablet by mouth every 4 (four) hours as needed for Pain. 30 tablet 1    ondansetron (ZOFRAN) 4 MG tablet Take 1 tablet (4 mg total) by mouth every 6 (six) hours as needed for Nausea (for nausea). 12 tablet 0     Lab Results   Component Value Date    WBC 10.24 02/01/2017    HGB 11.7 (L) 02/01/2017    HCT 35.8 (L) 02/01/2017    MCV 83 02/01/2017     (H) 02/01/2017     BMP  Lab Results   Component Value Date     09/30/2016    K 3.7 09/30/2016     09/30/2016    CO2 25 09/30/2016    BUN 8 09/30/2016    CREATININE 0.6 09/30/2016    CALCIUM 9.3 09/30/2016    ANIONGAP 7 (L) 09/30/2016    ESTGFRAFRICA >60 09/30/2016    EGFRNONAA >60 09/30/2016         Anesthesia Evaluation    I have reviewed the Patient Summary Reports.     I have reviewed the Medications.     Review of Systems  Anesthesia Hx:  No problems with previous Anesthesia   Denies Personal Hx of Anesthesia complications.   Cardiovascular:  Cardiovascular Normal Exercise tolerance: good     Pulmonary:   Asthma mild    Renal/:  Renal/ Normal     Hepatic/GI:  Hepatic/GI Normal    Neurological:  Neurology Normal  Denies CVA. Denies  Seizures.        Physical Exam  General:  Well nourished    Airway/Jaw/Neck:  Airway Findings: Mouth Opening: Normal Tongue: Normal  General Airway Assessment: Adult  Mallampati: II  Improves to I with phonation.  TM Distance: Normal, at least 6 cm  Jaw/Neck Findings:  Neck ROM: Normal ROM      Dental:  Dental Findings:         Mental Status:  Mental Status Findings:  Cooperative, Alert and Oriented         Anesthesia Plan  Type of Anesthesia, risks & benefits discussed:  Anesthesia Type:  general  Patient's Preference:   Intra-op Monitoring Plan: standard ASA monitors  Intra-op Monitoring Plan Comments:   Post Op Pain Control Plan: per primary service following discharge from PACU, IV/PO Opioids PRN and multimodal analgesia  Post Op Pain Control Plan Comments:   Induction:   IV  Beta Blocker:  Patient is not currently on a Beta-Blocker (No further documentation required).       Informed Consent: Patient understands risks and agrees with Anesthesia plan.  Questions answered. Anesthesia consent signed with patient.  ASA Score: 2     Day of Surgery Review of History & Physical:    H&P update referred to the surgeon.         Ready For Surgery From Anesthesia Perspective.

## 2018-05-29 NOTE — TRANSFER OF CARE
"Anesthesia Transfer of Care Note    Patient: Mackenzie Smart    Procedure(s) Performed: Procedure(s) (LRB):  RECONSTRUCTION-NASAL (Bilateral)    Patient location: PACU    Anesthesia Type: general    Transport from OR: Transported from OR on 6-10 L/min O2 by face mask with adequate spontaneous ventilation    Post pain: adequate analgesia    Post assessment: tolerated procedure well and no apparent anesthetic complications    Post vital signs: stable    Level of consciousness: sedated and responds to stimulation    Nausea/Vomiting: no nausea/vomiting    Complications: none    Transfer of care protocol was followed      Last vitals:   Visit Vitals  /83 (BP Location: Right arm, Patient Position: Lying)   Pulse 74   Temp 36.6 °C (97.9 °F) (Axillary)   Resp 18   Ht 5' 6" (1.676 m)   Wt 59.9 kg (132 lb)   SpO2 98%   Breastfeeding? No   BMI 21.31 kg/m²     "

## 2018-05-30 NOTE — ANESTHESIA POSTPROCEDURE EVALUATION
"Anesthesia Post Evaluation    Patient: Mackenzie Smart    Procedure(s) Performed: Procedure(s) (LRB):  RECONSTRUCTION-NASAL (Bilateral)    Final Anesthesia Type: general  Patient location during evaluation: PACU  Patient participation: Yes- Able to Participate  Level of consciousness: awake and alert  Post-procedure vital signs: reviewed and stable  Pain management: adequate  Airway patency: patent  PONV status at discharge: No PONV  Anesthetic complications: no      Cardiovascular status: stable  Respiratory status: unassisted and spontaneous ventilation  Hydration status: euvolemic  Follow-up not needed.        Visit Vitals  BP (!) 135/95 (BP Location: Right arm, Patient Position: Lying)   Pulse 66   Temp 36.7 °C (98.1 °F) (Temporal)   Resp 16   Ht 5' 6" (1.676 m)   Wt 59.9 kg (132 lb)   SpO2 100%   Breastfeeding? No   BMI 21.31 kg/m²       Pain/Michael Score: Pain Assessment Performed: Yes (5/29/2018  6:15 PM)  Presence of Pain: other (see comments) (patient states pain is tolerable) (5/29/2018  6:15 PM)  Pain Rating Prior to Med Admin: 8 (5/29/2018  5:45 PM)  Pain Rating Post Med Admin: 6 (5/29/2018  6:00 PM)  Michael Score: 10 (5/29/2018  6:15 PM)      "

## 2018-06-01 ENCOUNTER — HOSPITAL ENCOUNTER (EMERGENCY)
Facility: HOSPITAL | Age: 23
Discharge: HOME | End: 2018-06-01
Attending: EMERGENCY MEDICINE | Admitting: OTOLARYNGOLOGY
Payer: MEDICAID

## 2018-06-01 VITALS
OXYGEN SATURATION: 97 % | DIASTOLIC BLOOD PRESSURE: 93 MMHG | RESPIRATION RATE: 16 BRPM | TEMPERATURE: 98 F | SYSTOLIC BLOOD PRESSURE: 140 MMHG | HEART RATE: 78 BPM

## 2018-06-01 DIAGNOSIS — R04.0 EPISTAXIS: Primary | ICD-10-CM

## 2018-06-01 DIAGNOSIS — R42 ORTHOSTATIC DIZZINESS: ICD-10-CM

## 2018-06-01 LAB
ALBUMIN SERPL-MCNC: 4.3 G/DL (ref 3.3–5.5)
ALP SERPL-CCNC: 79 U/L (ref 42–141)
B-HCG UR QL: NEGATIVE
BASOPHILS # BLD AUTO: 0.05 K/UL
BASOPHILS NFR BLD: 0.5 %
BILIRUB SERPL-MCNC: 0.9 MG/DL (ref 0.2–1.6)
BILIRUBIN, POC UA: NEGATIVE
BLOOD, POC UA: ABNORMAL
BUN SERPL-MCNC: 7 MG/DL (ref 7–22)
CALCIUM SERPL-MCNC: 9.7 MG/DL (ref 8–10.3)
CHLORIDE SERPL-SCNC: 100 MMOL/L (ref 98–108)
CLARITY, POC UA: CLEAR
COLOR, POC UA: YELLOW
CREAT SERPL-MCNC: 0.9 MG/DL (ref 0.6–1.2)
CTP QC/QA: YES
DIFFERENTIAL METHOD: NORMAL
EOSINOPHIL # BLD AUTO: 0.1 K/UL
EOSINOPHIL NFR BLD: 1.5 %
ERYTHROCYTE [DISTWIDTH] IN BLOOD BY AUTOMATED COUNT: 13 %
GLUCOSE SERPL-MCNC: 97 MG/DL (ref 73–118)
GLUCOSE, POC UA: NEGATIVE
HCT VFR BLD AUTO: 41.5 %
HGB BLD-MCNC: 13.7 G/DL
IMM GRANULOCYTES # BLD AUTO: 0.02 K/UL
IMM GRANULOCYTES NFR BLD AUTO: 0.2 %
INR PPP: 1
KETONES, POC UA: NEGATIVE
LEUKOCYTE EST, POC UA: NEGATIVE
LYMPHOCYTES # BLD AUTO: 3 K/UL
LYMPHOCYTES NFR BLD: 32.2 %
MCH RBC QN AUTO: 30 PG
MCHC RBC AUTO-ENTMCNC: 33 G/DL
MCV RBC AUTO: 91 FL
MONOCYTES # BLD AUTO: 0.4 K/UL
MONOCYTES NFR BLD: 4 %
NEUTROPHILS # BLD AUTO: 5.7 K/UL
NEUTROPHILS NFR BLD: 61.6 %
NITRITE, POC UA: NEGATIVE
NRBC BLD-RTO: 0 /100 WBC
PH UR STRIP: 7 [PH]
PLATELET # BLD AUTO: 322 K/UL
PMV BLD AUTO: 9.8 FL
POC ALT (SGPT): 23 U/L (ref 10–47)
POC AST (SGOT): 35 U/L (ref 11–38)
POC TCO2: 30 MMOL/L (ref 18–33)
POTASSIUM BLD-SCNC: 3.4 MMOL/L (ref 3.6–5.1)
PROTEIN, POC UA: NEGATIVE
PROTEIN, POC: 8 G/DL (ref 6.4–8.1)
PROTHROMBIN TIME: 10.7 SEC
RBC # BLD AUTO: 4.57 M/UL
SODIUM BLD-SCNC: 140 MMOL/L (ref 128–145)
SPECIFIC GRAVITY, POC UA: 1.01
UROBILINOGEN, POC UA: 0.2 E.U./DL
WBC # BLD AUTO: 9.32 K/UL

## 2018-06-01 PROCEDURE — 96361 HYDRATE IV INFUSION ADD-ON: CPT

## 2018-06-01 PROCEDURE — 85025 COMPLETE CBC W/AUTO DIFF WBC: CPT

## 2018-06-01 PROCEDURE — 99285 EMERGENCY DEPT VISIT HI MDM: CPT

## 2018-06-01 PROCEDURE — 25000003 PHARM REV CODE 250: Performed by: STUDENT IN AN ORGANIZED HEALTH CARE EDUCATION/TRAINING PROGRAM

## 2018-06-01 PROCEDURE — 81025 URINE PREGNANCY TEST: CPT | Performed by: EMERGENCY MEDICINE

## 2018-06-01 PROCEDURE — 80053 COMPREHEN METABOLIC PANEL: CPT

## 2018-06-01 PROCEDURE — 25000003 PHARM REV CODE 250: Performed by: EMERGENCY MEDICINE

## 2018-06-01 PROCEDURE — 96360 HYDRATION IV INFUSION INIT: CPT

## 2018-06-01 PROCEDURE — 85610 PROTHROMBIN TIME: CPT

## 2018-06-01 PROCEDURE — 81003 URINALYSIS AUTO W/O SCOPE: CPT

## 2018-06-01 RX ORDER — ACETAMINOPHEN 325 MG/1
650 TABLET ORAL
Status: COMPLETED | OUTPATIENT
Start: 2018-06-01 | End: 2018-06-01

## 2018-06-01 RX ORDER — FAMOTIDINE 10 MG/ML
20 INJECTION INTRAVENOUS
Status: DISCONTINUED | OUTPATIENT
Start: 2018-06-01 | End: 2018-06-01

## 2018-06-01 RX ORDER — OXYMETAZOLINE HCL 0.05 %
1 SPRAY, NON-AEROSOL (ML) NASAL 2 TIMES DAILY
Qty: 15 ML | Refills: 0 | Status: SHIPPED | OUTPATIENT
Start: 2018-06-01 | End: 2018-06-04

## 2018-06-01 RX ORDER — POTASSIUM CHLORIDE 20 MEQ/1
40 TABLET, EXTENDED RELEASE ORAL
Status: COMPLETED | OUTPATIENT
Start: 2018-06-01 | End: 2018-06-01

## 2018-06-01 RX ORDER — ACETAMINOPHEN 325 MG/1
650 TABLET ORAL EVERY 6 HOURS PRN
Status: DISCONTINUED | OUTPATIENT
Start: 2018-06-01 | End: 2018-06-01 | Stop reason: HOSPADM

## 2018-06-01 RX ORDER — OXYMETAZOLINE HCL 0.05 %
1 SPRAY, NON-AEROSOL (ML) NASAL 2 TIMES DAILY
Qty: 15 ML | Refills: 3 | Status: SHIPPED | OUTPATIENT
Start: 2018-06-01 | End: 2018-06-01 | Stop reason: CLARIF

## 2018-06-01 RX ORDER — ONDANSETRON 2 MG/ML
8 INJECTION INTRAMUSCULAR; INTRAVENOUS
Status: DISCONTINUED | OUTPATIENT
Start: 2018-06-01 | End: 2018-06-01

## 2018-06-01 RX ADMIN — ACETAMINOPHEN 650 MG: 325 TABLET ORAL at 11:06

## 2018-06-01 RX ADMIN — ACETAMINOPHEN 650 MG: 325 TABLET, FILM COATED ORAL at 05:06

## 2018-06-01 RX ADMIN — SODIUM CHLORIDE 1000 ML: 0.9 INJECTION, SOLUTION INTRAVENOUS at 10:06

## 2018-06-01 RX ADMIN — SODIUM CHLORIDE 1000 ML: 0.9 INJECTION, SOLUTION INTRAVENOUS at 03:06

## 2018-06-01 RX ADMIN — POTASSIUM CHLORIDE 40 MEQ: 1500 TABLET, EXTENDED RELEASE ORAL at 11:06

## 2018-06-01 NOTE — ED PROVIDER NOTES
Encounter Date: 6/1/2018    SCRIBE #1 NOTE: I, Marisela Atkinson, am scribing for, and in the presence of,  Dr. Engel . I have scribed the following portions of the note - the Resident attestation.       History     Chief Complaint   Patient presents with    Post-op Problem     Pt reports having nasal surgery on 5/29 and [facility], pt instructed to f/u with physician in two weeks but pt reports increased bleeding from nares, HA, lethargy    Transfer     from [facility]     Ms. Mackenzie Smart is a 23 yr old female who presents with post-operative bleed from left nostril following surgical reconstruction performed by Dr. Briggs with ENT on 05/29. Patient reports that since that day, she has had constant bleed from her left nostril; it has since slowed down. She changes the bandage every 1-2 hours. Associated symptoms include severe headache, dizziness, light-headedness, weakness, and decreased PO intake. She initially was coughing up blood as well, as she expected following nasal surgery. Denies bleed from right nostril, nasal pain, or bleeding from other sites. She is not on blood thinners. She has no history of coagulopathy.    Patient initially reported to [facility]. CT maxillofacial demonstrated interval improvement in fracture lines but possible hemorrhage within the nasal fossa and caudal aspects of both maxillary antra. Vital signs stable. Hb count not yet obtains. She was fluid resuscitated and transferred to [facility] as accepted by ENT service.          Review of patient's allergies indicates:  No Known Allergies  Past Medical History:   Diagnosis Date    Asthma     Blood clot associated with vein wall inflammation     History of broken nose      Past Surgical History:   Procedure Laterality Date    CHOLECYSTECTOMY  2010    RECONSTRUCTION OF NOSE Bilateral 5/29/2018    Procedure: RECONSTRUCTION-NASAL;  Surgeon: Yao Briggs MD;  Location: [facility] OR [address] [place] [address];  Service: ENT;  Laterality:  Bilateral;  Septoplasty set, rhinoplasty set    VAGINAL DELIVERY      X 2     Family History   Problem Relation Age of Onset    Hypertension Mother     No Known Problems Brother     No Known Problems Brother     No Known Problems Brother     No Known Problems Father     No Known Problems Sister     No Known Problems Maternal Aunt     No Known Problems Maternal Uncle     No Known Problems Paternal Aunt     No Known Problems Paternal Uncle     No Known Problems Maternal Grandmother     No Known Problems Maternal Grandfather     No Known Problems Paternal Grandmother     No Known Problems Paternal Grandfather     Breast cancer Neg Hx     Colon cancer Neg Hx     Ovarian cancer Neg Hx     Amblyopia Neg Hx     Blindness Neg Hx     Cancer Neg Hx     Cataracts Neg Hx     Diabetes Neg Hx     Glaucoma Neg Hx     Macular degeneration Neg Hx     Retinal detachment Neg Hx     Strabismus Neg Hx     Stroke Neg Hx     Thyroid disease Neg Hx      Social History   Substance Use Topics    Smoking status: Never Smoker    Smokeless tobacco: Never Used    Alcohol use No     Review of Systems   Constitutional: Positive for appetite change and fatigue. Negative for chills and fever.   HENT: Positive for congestion (nasal) and nosebleeds.    Eyes: Positive for photophobia. Negative for pain, redness and visual disturbance.   Respiratory: Negative for shortness of breath.    Cardiovascular: Negative for chest pain.   Gastrointestinal: Positive for diarrhea, nausea and vomiting. Negative for abdominal pain and blood in stool.   Genitourinary: Negative for hematuria.   Musculoskeletal: Negative for neck pain and neck stiffness.   Skin: Negative for color change.   Neurological: Positive for dizziness, weakness (generalized), light-headedness and headaches. Negative for syncope and numbness.   Hematological: Bruises/bleeds easily.       Physical Exam     Initial Vitals [06/01/18 1008]   BP Pulse Resp Temp SpO2    (!) 151/99 84 18 98.1 °F (36.7 °C) 98 %      MAP       116.33         Physical Exam    Constitutional: She appears well-developed and well-nourished.   HENT:   Head: Normocephalic.   Nose initially bandaged. With removal, there is bright red blood in left nasal cavity. No active bleed apparent at the time. Packing in place.   Eyes: EOM are normal. Pupils are equal, round, and reactive to light.   Neck: Normal range of motion.   Cardiovascular: Normal rate, regular rhythm and normal heart sounds.   Pulmonary/Chest: Breath sounds normal.   Abdominal: Soft. Bowel sounds are normal.   Musculoskeletal: She exhibits no edema or tenderness.   Neurological: She is alert and oriented to person, place, and time.   Skin: Skin is warm and dry. There is pallor.   Psychiatric: She has a normal mood and affect. Thought content normal.         ED Course   Procedures  Labs Reviewed   POCT URINALYSIS W/O SCOPE - Abnormal; Notable for the following:        Result Value    Glucose, UA Negative (*)     Bilirubin, UA Negative (*)     Ketones, UA Negative (*)     Blood, UA Trace-lysed (*)     Protein, UA Negative (*)     Nitrite, UA Negative (*)     Leukocytes, UA Negative (*)     All other components within normal limits   POCT CMP - Abnormal; Notable for the following:     POC Potassium 3.4 (*)     All other components within normal limits   PROTIME-INR   CBC W/ AUTO DIFFERENTIAL   POCT URINE PREGNANCY   POCT CBC   POCT URINALYSIS W/O SCOPE   POCT CMP             Medical Decision Making:   History:   Old Medical Records: I decided to obtain old medical records.  Old Records Summarized: records from another hospital and records from previous admission(s).       <> Summary of Records: S/p nasal rhinoplasty 05/29 at [facility]. Reviewed ED visit at [facility] where patient was fluid resuscitated and her potassium was supplemented.  Initial Assessment:   Ms. Mackenzie Smart is a 23 yr old female with no significant PMHx who  presents with post-operative bleeding from her left nare following rhinoplasty 05/29. Overall, patient's vital signs are stable. She appears pale and dysphoric but is in no acute distress.      Differential Diagnosis:   Post-operative bleed vs sinusitis vs infection vs coagulopathy.    In regards to light-headedness, may be hypovolemia in setting of blood loss but patient's vitals not far from baseline. Component of dehydration vs hypoglycemia in setting of recent decreased intake.  Clinical Tests:   Lab Tests: Ordered and Reviewed       <> Summary of Lab: Hb/Hct 13.7/41.5  ED Management:  -Will obtain CBC   -Contacted ENT who will evaluate patient.   -As discussed with resident, post-operative bleed and headache is anticipated for 1 week following procedure. Will likely be able to discharge with antibiotics and Afrin, and plan to follow up with ENT for post-op appointment as initially scheduled.    6:44 PM  -Hb/Hct stable  -ENT resident evaluated patient  -Patient has cephalexin at home from her surgery 05/29. She has not been taking it due to decreased appetite and PO intake. Instructed to start PO cephalexin 500 Q8H for 10 days. Follow up with Dr. Briggs ENT clinic for scheduled post-op appointment 06/15 at 2:30pm.   -Plan discussed with and approved by ENT resident.   -Medically stable for discharge.  Other:   I discussed test(s) with the performing physician.       <> Summary of the Findings: Discussed with Dr. Engel  I have discussed this case with another health care provider.       <> Summary of the Discussion: Discussed with ENT resident.            Scribe Attestation:   Scribe #1: I performed the above scribed service and the documentation accurately describes the services I performed. I attest to the accuracy of the note.    Attending Attestation:   Physician Attestation Statement for Resident:  As the supervising MD   Physician Attestation Statement: I have personally seen and examined this patient.    I agree with the above history. -: 24 yo female post op day 5 from rhinoplasty with persistent bleeding transferred for ENT evaluation. Hemodynamically stable. Hb increased from previous. Seen and evaluated by ENT. Discharge home with follow up and return precautions.   As the supervising MD I agree with the above PE.    As the supervising MD I agree with the above treatment, course, plan, and disposition.          Physician Attestation for Scribe:      Comments: I, Dr. Niko Engel, personally performed the services described in this documentation. All medical record entries made by the scribe were at my direction and in my presence.  I have reviewed the chart and agree that the record reflects my personal performance and is accurate and complete. Niko Engel MD.  6:52 PM 06/01/2018                 Clinical Impression:   The primary encounter diagnosis was Epistaxis. A diagnosis of Orthostatic dizziness was also pertinent to this visit.    CT Maxillofacial Without Contrast   Final Result      Improvement in the alignment of the previously identified displaced fractures of the nasal bones bilaterally with mild improvement in the alignment of the nasal septal fractures as well with nasal splints on either side of the nasal septum.      Probable small amount of blood products within the nasal fossa.  There is mucosal thickening versus a small amount of blood products within the caudal and posterior aspects of the maxillary antra.  Mild mucosal thickening is noted within the ethmoid sinuses.         Electronically signed by: Vicente Son MD   Date:    06/01/2018   Time:    11:51                                 Latisha Varela MD  Resident  06/01/18 [id number]       Niko Engel MD  06/01/18 [id number]

## 2018-06-01 NOTE — DISCHARGE INSTRUCTIONS
-take Cephalexin 500mg three times daily, end date: 06/11/2018  -Afrin spray 2-6 times a day, as needed   -Follow-up with Dr. Serarno's ENT clinic for post-operative appointment 06/15/2018 at 2:30 PM  -Return to ED if worsening  weakness, light-headedness, bleeding, fevers, or any other new/worsening symptoms.

## 2018-06-01 NOTE — ED NOTES
Per [facility], pt to be transferred to [facility] ED, accepted by Dr. Rahman. [phone]. [facility] to arrange transport at this time

## 2018-06-01 NOTE — ED PROVIDER NOTES
Encounter Date: 6/1/2018       History     Chief Complaint   Patient presents with    Post-op Problem     Pt reports having nasal surgery on 5/29 and [facility], pt instructed to f/u with physician in two weeks but pt reports increased bleeding from nares, HA, lethargy     23-year-old female chief complaint left nostril bleeding after having surgery.  Patient states she had surgery for nasal bone fracture on 05/29 her surgeon is Dr. briggs.  Patient states she has been bleeding.  The bleeding is not as bad is initially but her left nostril continues to bleed.  No further bleeding from the right nostril.  Patient feels like she is nauseated her stomach is upset and she feels lightheaded.  Patient feels like she has lost too much blood.  Patient reports history of asthma.  Patient does not smoke cigarettes, does not drink alcohol and does not use drugs.      The history is provided by the patient.     Review of patient's allergies indicates:  No Known Allergies  Past Medical History:   Diagnosis Date    Asthma     Blood clot associated with vein wall inflammation     History of broken nose      Past Surgical History:   Procedure Laterality Date    CHOLECYSTECTOMY 2010    RECONSTRUCTION OF NOSE Bilateral 5/29/2018    Procedure: RECONSTRUCTION-NASAL;  Surgeon: Yao Briggs MD;  Location: [facility] OR [address] [place] [address];  Service: ENT;  Laterality: Bilateral;  Septoplasty set, rhinoplasty set    VAGINAL DELIVERY      X 2     Family History   Problem Relation Age of Onset    Hypertension Mother     No Known Problems Brother     No Known Problems Brother     No Known Problems Brother     No Known Problems Father     No Known Problems Sister     No Known Problems Maternal Aunt     No Known Problems Maternal Uncle     No Known Problems Paternal Aunt     No Known Problems Paternal Uncle     No Known Problems Maternal Grandmother     No Known Problems Maternal Grandfather     No Known Problems Paternal Grandmother      No Known Problems Paternal Grandfather     Breast cancer Neg Hx     Colon cancer Neg Hx     Ovarian cancer Neg Hx     Amblyopia Neg Hx     Blindness Neg Hx     Cancer Neg Hx     Cataracts Neg Hx     Diabetes Neg Hx     Glaucoma Neg Hx     Macular degeneration Neg Hx     Retinal detachment Neg Hx     Strabismus Neg Hx     Stroke Neg Hx     Thyroid disease Neg Hx      Social History   Substance Use Topics    Smoking status: Never Smoker    Smokeless tobacco: Never Used    Alcohol use No     Review of Systems   Constitutional: Negative for chills and fever.   HENT: Positive for nosebleeds. Negative for sore throat.    Respiratory: Negative for shortness of breath.    Cardiovascular: Negative for chest pain.   Gastrointestinal: Negative for nausea.   Genitourinary: Negative for dysuria.   Musculoskeletal: Negative for back pain.   Skin: Negative for rash.   Neurological: Negative for weakness.   Hematological: Does not bruise/bleed easily.   All other systems reviewed and are negative.      Physical Exam     Initial Vitals [06/01/18 1008]   BP Pulse Resp Temp SpO2   (!) 151/99 84 18 98.1 °F (36.7 °C) 98 %      MAP       116.33         Physical Exam    Nursing note and vitals reviewed.  Constitutional: She appears well-developed and well-nourished.   HENT:   Head: Normocephalic and atraumatic.   Right Ear: Tympanic membrane and external ear normal.   Left Ear: Tympanic membrane and external ear normal.   Nose: Epistaxis is observed.   Mouth/Throat: Uvula is midline, oropharynx is clear and moist and mucous membranes are normal.   Eyes: Conjunctivae and EOM are normal. Pupils are equal, round, and reactive to light. Right eye exhibits no discharge. Left eye exhibits no discharge.   Neck: Normal range of motion. Neck supple.   Cardiovascular: Normal rate, regular rhythm, normal heart sounds and intact distal pulses. Exam reveals no gallop and no friction rub.    No murmur heard.  Pulmonary/Chest: Breath  sounds normal. No respiratory distress. She has no wheezes. She has no rhonchi. She has no rales. She exhibits no tenderness.   Abdominal: Soft. Bowel sounds are normal. She exhibits no distension and no mass. There is no tenderness. There is no rebound and no guarding.   Musculoskeletal: Normal range of motion. She exhibits no edema or tenderness.   Neurological: She is alert and oriented to person, place, and time. She has normal strength and normal reflexes. She displays normal reflexes. No cranial nerve deficit or sensory deficit.   Skin: Skin is warm and dry. Capillary refill takes less than 2 seconds. No rash noted. No pallor.   Psychiatric: She has a normal mood and affect.         ED Course   Critical Care  Date/Time: 6/1/2018 3:32 PM  Performed by: ELEN SANDOVAL  Authorized by: ELEN SANDOVAL   Direct patient critical care time: 8 minutes  Additional history critical care time: 8 minutes  Ordering / reviewing critical care time: 8 minutes  Documentation critical care time: 8 minutes  Consulting other physicians critical care time: 5 minutes  Total critical care time (exclusive of procedural time) : 37 minutes  Critical care was necessary to treat or prevent imminent or life-threatening deterioration of the following conditions: circulatory failure.  Critical care was time spent personally by me on the following activities: evaluation of patient's response to treatment, examination of patient, obtaining history from patient or surrogate, ordering and performing treatments and interventions, ordering and review of radiographic studies, ordering and review of laboratory studies, pulse oximetry, re-evaluation of patient's condition and review of old charts.        Labs Reviewed   POCT URINALYSIS W/O SCOPE - Abnormal; Notable for the following:        Result Value    Glucose, UA Negative (*)     Bilirubin, UA Negative (*)     Ketones, UA Negative (*)     Blood, UA Trace-lysed (*)     Protein, UA Negative (*)      Nitrite, UA Negative (*)     Leukocytes, UA Negative (*)     All other components within normal limits   POCT CMP - Abnormal; Notable for the following:     POC Potassium 3.4 (*)     All other components within normal limits   PROTIME-INR   POCT URINE PREGNANCY   POCT CBC   POCT URINALYSIS W/O SCOPE   POCT CMP     UA negative leukocytes negative nitrates  CMP sodium 140, potassium 3.4, bicarb 30, chloride 100, glucose 97, BUN 7 creatinine 0.9   INR, will send to lab for actual result  INR Less than 0.9  CBC white blood cell count 9.6, hemoglobin 13.7, hematocrit 40.4, and platelets 320       Imaging Results          CT Maxillofacial Without Contrast (Final result)  Result time 06/01/18 11:51:13   Procedure changed from CT Maxillofacial W WO Contrast     Final result by Vicente Son MD (06/01/18 11:51:13)                 Impression:      Improvement in the alignment of the previously identified displaced fractures of the nasal bones bilaterally with mild improvement in the alignment of the nasal septal fractures as well with nasal splints on either side of the nasal septum.    Probable small amount of blood products within the nasal fossa.  There is mucosal thickening versus a small amount of blood products within the caudal and posterior aspects of the maxillary antra.  Mild mucosal thickening is noted within the ethmoid sinuses.      Electronically signed by: Vicente Son MD  Date:    06/01/2018  Time:    11:51             Narrative:    EXAMINATION:  CT MAXILLOFACIAL WITHOUT CONTRAST    CLINICAL HISTORY:  Bleeding post op;    TECHNIQUE:  Low dose axial images, sagittal and coronal reformations were obtained through the face.  Contrast was not administered.    COMPARISON:  04/09/2018.    FINDINGS:  There are bilateral nasal bone fractures with a fracture of the nasal septum noted as well.  Since the prior examination there has been interval surgery with nasal reconstruction and repair of septal fracture.  The  fracture lines are still evident, however there is improvement in the bony alignment when compared to the prior exam.  There are nasal splints identified within the nasal fossa on either side of the nasal septum.  There is soft tissue density within the nasal fossa likely representing a small amount of hemorrhage.  There is mucosal thickening versus a small amount of hemorrhage along the caudal aspects of both maxillary antra.  Mild mucosal thickening is noted within the ethmoid sinuses.  Mastoid air cells are clear.                                                          Medical decision making   Chief complaint:left nostril bleeding after having surgery.  Patient states she had surgery for nasal bone fracture on 05/29 her surgeon is Dr. romero.  Patient states she has been bleeding.  The bleeding is not as bad is initially but her left nostril continues to bleed.   Differential diagnosis:  Postop bleeding, acute anemia, symptomatic anemia, and epistaxis  Treatment in the ED Physical Exam, orthostatic vital signs, IV fluids, potassium, and Tylenol  Initially patient complained of nausea and stomach upset however she refused Zofran and Pepcid.  Patient reports total resolution of symptoms after medication.    Discussed labs, and imaging results.    UA negative leukocytes negative nitrates  CMP sodium 140, potassium 3.4, bicarb 30, chloride 100, glucose 97, BUN 7 creatinine 0.9   INR, will send to lab for actual result  INR Less than 0.9  CBC white blood cell count 9.6, hemoglobin 13.7, hematocrit 40.4, and platelets 320    Patient is agreeable to transfer to [facility] ED.  ADT 22 orders placed at 12:16.   Francisca with the [facility] called at 13:00.  Requesting consultation with ENT.   Discussed patient's presentation, past medical history, physical exam, and ED course.  Consultation with DR Rod for recommendations.  Recommend ED to ED transfer for ENT evaluation.  Also discussed vital signs and diagnosis of  postop bleeding, epistaxis left nostril.  At this time patient will be transferred Ed to ED.      At time of transfer patient is awake alert oriented x4 speaking clearly in full sentences and moving all 4 extremities.           Clinical Impression:   The primary encounter diagnosis was Orthostatic dizziness. A diagnosis of Epistaxis was also pertinent to this visit.    CT Maxillofacial Without Contrast    (Results Pending)                              Briana Petersen,   06/01/18 1531       Briana Petersen,   06/01/18 1533

## 2018-06-01 NOTE — ED TRIAGE NOTES
Patient identifiers for Mackenzie Smart 23 y.o. female checked and correct. Patient presents to the ED, transferred from [facility]. Patient states that she had nasal surgery to repair a broken nose on 5/29/18 and was told to follow-up 2 weeks after the surgery. However, she states that since the surgery, she has been having increased bleeding from nose causing her to become weak, lightheaded and dizzy.    Patient sitting up in chair, appears to be resting comfortably and in no acute distress. Patient is clean and well groomed and clothing is properly fastened.    NEUROLOGICAL: The patient is awake, alert and oriented to person, time, place, and situation and speaking clearly and appropriately. Eyes open spontaneously, behavior appropriate to situation, follows commands, facial expression symmetrical, purposeful motor response noted, normal sensation in all extremities when touched with a finger. She is complaining of frontal headache, 8/10.  CARDIOVASCULAR: Pulses intact. No peripheral edema noted, capillary refill < 3 seconds.   RESPIRATORY: Airway is open, respirations are spontaneous, patient has a normal effort and rate, no accessory muscle use noted.   GASTROINTESTINAL: Abdomen is soft and non-tender to palpation, no distention noted.  GENITOURINARY: Patient voids spontaneously without difficulty. Patient is continent.   MUSCULOSKELETAL: Patient moving all extremities spontaneously, no obvious swelling or deformities noted. Generalized weakness reported.  INTEGUMENTARY: The skin is warm and dry, color consistent with ethnicity, patient has normal skin turgor and moist mucus membranes. Skin is intact, no breakdown or bruising noted.  Nasal dressing is clean, dry, and intact.  PSYCHOSOCIAL: Calm, pleasant, and cooperative.

## 2018-06-02 NOTE — CONSULTS
[facility]  Otorhinolaryngology-Head & Neck Surgery  Consult Note    Patient Name: Mackenzie Smart  MRN: [id number]  Code Status: Prior  Admission Date: (Not on file)  Hospital Length of Stay: 0 days  Attending Physician: Jose Rahman MD  Primary Care Provider: Alida Varela MD    Patient information was obtained from patient.     Consults  Subjective:     Chief Complaint/Reason for Admission: Epistaxis    History of Present Illness: Mackenzie Smart is a 22yo female well known to [facility] Otolaryngology service presenting as a transfer from [facility]. In brief, she underwent an open septorhinoplasty on 5/29/18. Since then, she has had continued slow bleeding and on and off headache/lightheadedness. She describes this bleeding as blood tinged mucus drainage that saturates her mustache dressing (requiring 3 changes a day).  Otolaryngology consulted to evaluate. Denies gross red blood. Denies blood in oropharynx.      Medications:  Continuous Infusions:  Scheduled Meds:  PRN Meds:     Current Facility-Administered Medications on File Prior to Encounter   Medication    [COMPLETED] acetaminophen tablet 650 mg    acetaminophen tablet 650 mg    medroxyPROGESTERone (DEPO-PROVERA) syringe 150 mg    [COMPLETED] potassium chloride SA CR tablet 40 mEq    [COMPLETED] sodium chloride 0.9% bolus 1,000 mL    [COMPLETED] sodium chloride 0.9% bolus 1,000 mL    [DISCONTINUED] famotidine (PF) injection 20 mg    [DISCONTINUED] ondansetron injection 8 mg     Current Outpatient Prescriptions on File Prior to Encounter   Medication Sig    butalbital-acetaminophen-caffeine -40 mg (FIORICET, ESGIC) -40 mg per tablet Take 1 tablet by mouth every 4 (four) hours as needed for Pain.    cephALEXin (KEFLEX) 500 MG capsule Take 1 capsule (500 mg total) by mouth every 8 (eight) hours.    ondansetron (ZOFRAN) 4 MG tablet Take 1 tablet (4 mg total) by mouth every 6 (six) hours as needed for Nausea (for  nausea).    oxyCODONE-acetaminophen (PERCOCET) 5-325 mg per tablet Take 1 tablet by mouth every 6 (six) hours as needed.    oxymetazoline (AFRIN) 0.05 % nasal spray 1 spray by Nasal route 2 (two) times daily.    [DISCONTINUED] oxymetazoline (AFRIN) 0.05 % nasal spray 1 spray by Nasal route 2 (two) times daily.       Review of patient's allergies indicates:  No Known Allergies    Past Medical History:   Diagnosis Date    Asthma     Blood clot associated with vein wall inflammation     History of broken nose      Past Surgical History:   Procedure Laterality Date    CHOLECYSTECTOMY  2010    RECONSTRUCTION OF NOSE Bilateral 5/29/2018    Procedure: RECONSTRUCTION-NASAL;  Surgeon: Yao Briggs MD;  Location: [facility] OR [address] [place] [address];  Service: ENT;  Laterality: Bilateral;  Septoplasty set, rhinoplasty set    VAGINAL DELIVERY      X 2     Family History     Problem Relation (Age of Onset)    Hypertension Mother    No Known Problems Brother, Brother, Brother, Father, Sister, Maternal Aunt, Maternal Uncle, Paternal Aunt, Paternal Uncle, Maternal Grandmother, Maternal Grandfather, Paternal Grandmother, Paternal Grandfather        Social History Main Topics    Smoking status: Never Smoker    Smokeless tobacco: Never Used    Alcohol use No    Drug use: No    Sexual activity: Yes     Partners: Male     Birth control/ protection: None     Review of Systems  Objective:     Vital Signs (Most Recent):    Vital Signs (24h Range):  Temp:  [98.1 °F (36.7 °C)-98.3 °F (36.8 °C)] 98.3 °F (36.8 °C)  Pulse:  [65-89] 78  Resp:  [16-18] 16  SpO2:  [97 %-100 %] 97 %  BP: (131-162)/() 140/93        There is no height or weight on file to calculate BMI.      Date 06/01/18 0700 - 06/02/18 0659   Shift 2606-3910 0635-6578 1550-0255 24 Hour Total   I  N  T  A  K  E   IV Piggyback 1000 1000  2000    Shift Total 1000 1000  2000   O  U  T  P  U  T   Shift Total       Weight (kg)           Physical Exam    General: Alert, well developed,  comfortable  Voice: Regular for age, good volume  Respiratory: Symmetric breathing, no stridor, no distress  Head: Normocephalic, no lesions  Face: Symmetric, HB 1/6 bilat, no lesions, no obvious sinus tenderness, salivary glands nontender  Eyes: Sclera white, extraocular movements intact  Nose: S/p OSRP. Splint and cast in place. No active bleeding noted. Pink mucus in left nasal cavity.   Right Ear: Pinna and external ear appears normal, EAC patent  Left Ear: Pinna and external ear appears normal, EAC patent  Hearing: Grossly intact  Oral cavity/OP: No blood in OP. Healthy mucosa, no masses or lesions including lips, gums, floor of mouth, palate, or tongue. Palate intact, normal pharyngeal wall movement  Neck: Supple, no palpable nodes, no masses, trachea midline, no thyroid masses  Cardiovascular system: Pulses regular in both upper extremities, good skin turgor      Significant Labs:  BMP: No results for input(s): GLU, CL, CO2, BUN, CREATININE, CALCIUM, MG in the last 168 hours.    Invalid input(s): SODIUM, POTASSIUM  CBC:   Recent Labs  Lab 06/01/18  1749   WBC 9.32   RBC 4.57   HGB 13.7   HCT 41.5      MCV 91   MCH 30.0   MCHC 33.0       Significant Diagnostics:  None    Assessment/Plan:     Epistaxis    22 yo AAF presenting POD3 s/p OSRP for epistaxis and light headedness. No active bleeding noted. H/H WNL. Vital signs stable.     -No active bleeding identified   No acute intervention  -Labs and vitals WNL  -Afrin as needed for bleeding  -Maintain regular post op follow up with Dr. Briggs  -Bereket per ER          VTE Risk Mitigation     None          Thank you for your consult. I will follow-up with patient. Please contact us if you have any additional questions.    Justino Panda MD  Otorhinolaryngology-Head & Neck Surgery  [facility]

## 2018-06-06 ENCOUNTER — TELEPHONE (OUTPATIENT)
Dept: OTOLARYNGOLOGY | Facility: CLINIC | Age: 23
End: 2018-06-06

## 2018-06-06 NOTE — TELEPHONE ENCOUNTER
----- Message from Leticia Weathers sent at 6/6/2018  1:56 PM CDT -----  Contact: patient  Please call above patient missed a call from the nurse

## 2018-06-06 NOTE — TELEPHONE ENCOUNTER
----- Message from Aleenaney Mirelesy sent at 6/6/2018 11:15 AM CDT -----  Contact: patient   Patient Requesting Sooner Appointment.     Reason for sooner appt.: Patient states that her nose is in a lot of pain P/O and would like to her appt to be moved up if possible. States that Dr wanted her to return 1 wk p/o but her appt was scheduled for almost 2 wks p/o    When is the first available appointment? Patient is scheduled on 06/15    Communication Preference: [phone]

## 2018-06-07 ENCOUNTER — OFFICE VISIT (OUTPATIENT)
Dept: OTOLARYNGOLOGY | Facility: CLINIC | Age: 23
End: 2018-06-07
Payer: MEDICAID

## 2018-06-07 VITALS
BODY MASS INDEX: 19.71 KG/M2 | WEIGHT: 122.13 LBS | DIASTOLIC BLOOD PRESSURE: 89 MMHG | HEART RATE: 107 BPM | TEMPERATURE: 98 F | SYSTOLIC BLOOD PRESSURE: 136 MMHG

## 2018-06-07 DIAGNOSIS — J34.89 NASAL OBSTRUCTION: ICD-10-CM

## 2018-06-07 DIAGNOSIS — S02.2XXD CLOSED FRACTURE OF NASAL BONE WITH ROUTINE HEALING, SUBSEQUENT ENCOUNTER: Primary | ICD-10-CM

## 2018-06-07 PROBLEM — R04.0 EPISTAXIS: Status: RESOLVED | Noted: 2018-06-01 | Resolved: 2018-06-07

## 2018-06-07 PROBLEM — S02.2XXA CLOSED FRACTURE OF NASAL BONES: Status: RESOLVED | Noted: 2018-04-17 | Resolved: 2018-06-07

## 2018-06-07 PROCEDURE — 99213 OFFICE O/P EST LOW 20 MIN: CPT | Mod: PBBFAC | Performed by: OTOLARYNGOLOGY

## 2018-06-07 PROCEDURE — 99999 PR PBB SHADOW E&M-EST. PATIENT-LVL III: CPT | Mod: PBBFAC,,, | Performed by: OTOLARYNGOLOGY

## 2018-06-07 PROCEDURE — 99024 POSTOP FOLLOW-UP VISIT: CPT | Mod: ,,, | Performed by: OTOLARYNGOLOGY

## 2018-06-07 NOTE — PROGRESS NOTES
HEAD AND NECK SURGICAL ONCOLOGY CLINIC NOTE    CC: F/U nasal reconstruction    TREATMENT HISTORY:  1. Nasal reconstruction 5/29/2018    INTERVAL HISTORY:Mackenzie Smart returns to the Head and Neck Surgical Oncology Clinic for follow-up of nasal reconstruction. No complaints today.Denies dysphagia, odynophagia, throat pain and otalgia.Voice is stable. Does not experience dry mouth. Denies fevers, chills, and nightsweats. There has not been any redness or drainage from the wound.    Exam:  Resolution of C-shaped deformity  Septum midline  Good nasal airway  Splints and cast removed    A/P: Continue [place] TID. OK to blow nose gently starting next week.  RTC 3 weeks

## 2019-07-31 ENCOUNTER — HOSPITAL ENCOUNTER (EMERGENCY)
Facility: HOSPITAL | Age: 24
Discharge: HOME | End: 2019-07-31
Attending: EMERGENCY MEDICINE
Payer: MEDICAID

## 2019-07-31 VITALS
HEART RATE: 84 BPM | HEIGHT: 66 IN | RESPIRATION RATE: 18 BRPM | BODY MASS INDEX: 20.89 KG/M2 | TEMPERATURE: 98 F | DIASTOLIC BLOOD PRESSURE: 70 MMHG | WEIGHT: 130 LBS | SYSTOLIC BLOOD PRESSURE: 113 MMHG | OXYGEN SATURATION: 100 %

## 2019-07-31 DIAGNOSIS — E87.6 HYPOKALEMIA: ICD-10-CM

## 2019-07-31 DIAGNOSIS — K52.9 GASTROENTERITIS: Primary | ICD-10-CM

## 2019-07-31 LAB
ALBUMIN SERPL-MCNC: 3.6 G/DL (ref 3.3–5.5)
ALBUMIN SERPL-MCNC: 3.8 G/DL (ref 3.3–5.5)
ALP SERPL-CCNC: 69 U/L (ref 42–141)
ALP SERPL-CCNC: 74 U/L (ref 42–141)
B-HCG UR QL: NEGATIVE
BILIRUB SERPL-MCNC: 0.7 MG/DL (ref 0.2–1.6)
BILIRUB SERPL-MCNC: 0.7 MG/DL (ref 0.2–1.6)
BILIRUBIN, POC UA: ABNORMAL
BLOOD, POC UA: ABNORMAL
BUN SERPL-MCNC: 8 MG/DL (ref 7–22)
CALCIUM SERPL-MCNC: 9.4 MG/DL (ref 8–10.3)
CHLORIDE SERPL-SCNC: 106 MMOL/L (ref 98–108)
CLARITY, POC UA: CLEAR
COLOR, POC UA: ABNORMAL
CREAT SERPL-MCNC: 0.9 MG/DL (ref 0.6–1.2)
CTP QC/QA: YES
GLUCOSE SERPL-MCNC: 90 MG/DL (ref 73–118)
GLUCOSE, POC UA: NEGATIVE
KETONES, POC UA: ABNORMAL
LEUKOCYTE EST, POC UA: NEGATIVE
NITRITE, POC UA: NEGATIVE
PH UR STRIP: 6 [PH]
POC ALT (SGPT): 13 U/L (ref 10–47)
POC ALT (SGPT): 18 U/L (ref 10–47)
POC AMYLASE: 59 U/L (ref 14–97)
POC AST (SGOT): 30 U/L (ref 11–38)
POC AST (SGOT): 33 U/L (ref 11–38)
POC GGT: 13 U/L (ref 5–65)
POC TCO2: 24 MMOL/L (ref 18–33)
POTASSIUM BLD-SCNC: 3.2 MMOL/L (ref 3.6–5.1)
PROTEIN, POC UA: ABNORMAL
PROTEIN, POC: 7 G/DL (ref 6.4–8.1)
PROTEIN, POC: 7.1 G/DL (ref 6.4–8.1)
SODIUM BLD-SCNC: 135 MMOL/L (ref 128–145)
SPECIFIC GRAVITY, POC UA: 1.02
UROBILINOGEN, POC UA: 0.2 E.U./DL

## 2019-07-31 PROCEDURE — 81003 URINALYSIS AUTO W/O SCOPE: CPT | Mod: ER

## 2019-07-31 PROCEDURE — 96374 THER/PROPH/DIAG INJ IV PUSH: CPT | Mod: ER

## 2019-07-31 PROCEDURE — 80053 COMPREHEN METABOLIC PANEL: CPT | Mod: ER

## 2019-07-31 PROCEDURE — 82150 ASSAY OF AMYLASE: CPT | Mod: ER

## 2019-07-31 PROCEDURE — 96361 HYDRATE IV INFUSION ADD-ON: CPT | Mod: ER

## 2019-07-31 PROCEDURE — 99284 EMERGENCY DEPT VISIT MOD MDM: CPT | Mod: 25,ER

## 2019-07-31 PROCEDURE — 63600175 PHARM REV CODE 636 W HCPCS: Mod: ER | Performed by: NURSE PRACTITIONER

## 2019-07-31 PROCEDURE — 85025 COMPLETE CBC W/AUTO DIFF WBC: CPT | Mod: ER

## 2019-07-31 PROCEDURE — 81025 URINE PREGNANCY TEST: CPT | Mod: ER | Performed by: EMERGENCY MEDICINE

## 2019-07-31 PROCEDURE — 96372 THER/PROPH/DIAG INJ SC/IM: CPT | Mod: ER

## 2019-07-31 PROCEDURE — 25000003 PHARM REV CODE 250: Mod: ER | Performed by: NURSE PRACTITIONER

## 2019-07-31 RX ORDER — ONDANSETRON 2 MG/ML
8 INJECTION INTRAMUSCULAR; INTRAVENOUS ONCE
Status: COMPLETED | OUTPATIENT
Start: 2019-07-31 | End: 2019-07-31

## 2019-07-31 RX ORDER — DICYCLOMINE HYDROCHLORIDE 20 MG/1
20 TABLET ORAL EVERY 6 HOURS PRN
Qty: 28 TABLET | Refills: 0 | Status: SHIPPED | OUTPATIENT
Start: 2019-07-31 | End: 2019-08-31

## 2019-07-31 RX ORDER — DICYCLOMINE HYDROCHLORIDE 10 MG/ML
20 INJECTION INTRAMUSCULAR
Status: COMPLETED | OUTPATIENT
Start: 2019-07-31 | End: 2019-07-31

## 2019-07-31 RX ORDER — ONDANSETRON 4 MG/1
4 TABLET, ORALLY DISINTEGRATING ORAL EVERY 8 HOURS PRN
Qty: 20 TABLET | Refills: 0 | Status: SHIPPED | OUTPATIENT
Start: 2019-07-31 | End: 2019-08-31

## 2019-07-31 RX ORDER — POTASSIUM CHLORIDE 20 MEQ/1
40 TABLET, EXTENDED RELEASE ORAL ONCE
Status: COMPLETED | OUTPATIENT
Start: 2019-07-31 | End: 2019-07-31

## 2019-07-31 RX ADMIN — DICYCLOMINE HYDROCHLORIDE 20 MG: 20 INJECTION, SOLUTION INTRAMUSCULAR at 12:07

## 2019-07-31 RX ADMIN — ONDANSETRON 8 MG: 2 INJECTION INTRAMUSCULAR; INTRAVENOUS at 12:07

## 2019-07-31 RX ADMIN — POTASSIUM CHLORIDE 40 MEQ: 1500 TABLET, EXTENDED RELEASE ORAL at 01:07

## 2019-07-31 RX ADMIN — SODIUM CHLORIDE 1000 ML: 0.9 INJECTION, SOLUTION INTRAVENOUS at 12:07

## 2019-07-31 NOTE — ED NOTES
Pt has no nausea nor vomiting since consuming PO challenge.  Pt reports feeling better compared to initial arrival to the ER and is comfortable leaving the facility.  Pt encouraged to return to ER for any new problems or if symptoms worsen.

## 2019-07-31 NOTE — ED PROVIDER NOTES
"Encounter Date: 7/31/2019       History     Chief Complaint   Patient presents with    Abdominal Pain     Pt states," Diarrhea since yesterday and stomach pains today."    Diarrhea     A 24-year-old female who presents to the ED with complaints of abdominal cramping, diarrhea and vomiting which started on yesterday.  Patient states she has been having intermittent diarrhea for 3 weeks.  She denies recent antibiotics.  Patient denies foreign traveling.  Denies sick contacts.  Patient states she has similar symptoms last year which she seen a gastroenterologist and the symptoms cleared up after she was given medicine.  Patient unable to recall the name of the medication.    The history is provided by the patient.   Abdominal Pain   The current episode started yesterday. The problem has been gradually worsening. The abdominal pain is generalized. The severity of the abdominal pain is 5/10. The other symptoms of the illness include vomiting and diarrhea. The other symptoms of the illness do not include fever or shortness of breath.   The diarrhea began more than 1 week ago. The diarrhea is watery. The diarrhea occurs 2 - 4 times per day.   The vomiting began yesterday. Vomiting occurs 2 to 5 times per day.     Review of patient's allergies indicates:  No Known Allergies  Past Medical History:   Diagnosis Date    Asthma     Blood clot associated with vein wall inflammation     History of broken nose      Past Surgical History:   Procedure Laterality Date    CHOLECYSTECTOMY  2010    LABIAPLASTY N/A 2/1/2017    Performed by [facility] at [facility] OR    RECONSTRUCTION-NASAL Bilateral 5/29/2018    Performed by [facility] at [facility] OR 2ND [facility]LR    REDUCTION-CLOSED-FRACTURE-NASAL Septal N/A 4/17/2018    Performed by Kelechi Duke MD at [facility] OR 2ND FLR     Family History   Problem Relation Age of Onset    Hypertension Mother     No Known Problems Brother     No Known Problems Brother     No Known " Problems Brother     No Known Problems Father     No Known Problems Sister     No Known Problems Maternal Aunt     No Known Problems Maternal Uncle     No Known Problems Paternal Aunt     No Known Problems Paternal Uncle     No Known Problems Maternal Grandmother     No Known Problems Maternal Grandfather     No Known Problems Paternal Grandmother     No Known Problems Paternal Grandfather     Breast cancer Neg Hx     Colon cancer Neg Hx     Ovarian cancer Neg Hx     Amblyopia Neg Hx     Blindness Neg Hx     Cancer Neg Hx     Cataracts Neg Hx     Diabetes Neg Hx     Glaucoma Neg Hx     Macular degeneration Neg Hx     Retinal detachment Neg Hx     Strabismus Neg Hx     Stroke Neg Hx     Thyroid disease Neg Hx      Social History     Tobacco Use    Smoking status: Never Smoker    Smokeless tobacco: Never Used   Substance Use Topics    Alcohol use: No    Drug use: No     Review of Systems   Constitutional: Negative.  Negative for fever.   HENT: Negative.    Eyes: Negative.    Respiratory: Negative.  Negative for shortness of breath.    Cardiovascular: Negative.  Negative for chest pain.   Gastrointestinal: Positive for abdominal pain, diarrhea and vomiting.   Endocrine: Negative.    Genitourinary: Negative.    Musculoskeletal: Negative.    Skin: Negative.    Allergic/Immunologic: Negative.    Neurological: Negative.    Hematological: Negative.    All other systems reviewed and are negative.      Physical Exam     Initial Vitals [07/31/19 1212]   BP Pulse Resp Temp SpO2   120/71 92 16 98 °F (36.7 °C) 99 %      MAP       --         Physical Exam    Nursing note and vitals reviewed.  Constitutional: Vital signs are normal. She appears well-developed. She is cooperative. She does not appear ill.   HENT:   Right Ear: External ear normal.   Left Ear: External ear normal.   Nose: Nose normal.   Mouth/Throat: Oropharynx is clear and moist.   Eyes: Conjunctivae and lids are normal.   Neck: Normal  range of motion. Neck supple.   Cardiovascular: Normal rate, regular rhythm, S1 normal, S2 normal and normal heart sounds. Exam reveals no gallop.    No murmur heard.  Pulmonary/Chest: Effort normal and breath sounds normal. No respiratory distress.   Abdominal: Soft. Normal appearance and bowel sounds are normal. There is no tenderness.   Musculoskeletal: Normal range of motion.   Neurological: She is alert and oriented to person, place, and time.   Skin: Skin is warm, dry and intact.   Psychiatric: She has a normal mood and affect. Her speech is normal.         ED Course   Procedures  Labs Reviewed   POCT URINALYSIS W/O SCOPE - Abnormal; Notable for the following components:       Result Value    Glucose, UA Negative (*)     Bilirubin, UA 1+ (*)     Ketones, UA Trace (*)     Blood, UA Trace-intact (*)     Protein, UA 1+ (*)     Nitrite, UA Negative (*)     Leukocytes, UA Negative (*)     All other components within normal limits   POCT CMP - Abnormal; Notable for the following components:    POC Potassium 3.2 (*)     All other components within normal limits   POCT URINE PREGNANCY   POCT CBC   POCT CMP   POCT AMYLASE   POCT LIVER PANEL          Imaging Results    None          Medical Decision Making:   Initial Assessment:   A 24-year-old female who presents to the ED with complaints of abdominal cramping, diarrhea and vomiting which started on yesterday.  Patient states she has been having intermittent diarrhea for 3 weeks.  She denies recent antibiotics.  Patient denies foreign traveling.  Denies sick contacts.  Patient states she has similar symptoms last year which she seen a gastroenterologist and the symptoms cleared up after she was given medicine.      Differential Diagnosis:   Gastroenteritis, gastritis, dehydration, electrolyte imbalance    Clinical Tests:   Lab Tests: Ordered and Reviewed  ED Management:  Patient given 1 L normal saline.  Medicated with Zofran and Bentyl.  Pain improved.  Able tolerated  oral fluid challenge well.  Unable to obtain adequate stool specimen to send off.   Follow-up with PCP in 1-2 days.   Return ED for worsening of symptoms.                      Clinical Impression:       ICD-10-CM ICD-9-CM   1. Gastroenteritis K52.9 558.9   2. Hypokalemia E87.6 276.8                                AVA Middleton  07/31/19 1616

## 2019-08-31 ENCOUNTER — HOSPITAL ENCOUNTER (EMERGENCY)
Facility: HOSPITAL | Age: 24
Discharge: HOME | End: 2019-08-31
Attending: EMERGENCY MEDICINE
Payer: MEDICAID

## 2019-08-31 VITALS
TEMPERATURE: 98 F | HEART RATE: 51 BPM | RESPIRATION RATE: 18 BRPM | DIASTOLIC BLOOD PRESSURE: 85 MMHG | BODY MASS INDEX: 20.57 KG/M2 | SYSTOLIC BLOOD PRESSURE: 138 MMHG | HEIGHT: 66 IN | OXYGEN SATURATION: 100 % | WEIGHT: 128 LBS

## 2019-08-31 DIAGNOSIS — R11.15 NON-INTRACTABLE CYCLICAL VOMITING WITH NAUSEA: Primary | ICD-10-CM

## 2019-08-31 DIAGNOSIS — R00.1 BRADYCARDIA, SINUS: ICD-10-CM

## 2019-08-31 DIAGNOSIS — R00.1 BRADYCARDIA: ICD-10-CM

## 2019-08-31 LAB
ALBUMIN SERPL-MCNC: 4.1 G/DL (ref 3.3–5.5)
ALBUMIN SERPL-MCNC: 4.3 G/DL (ref 3.3–5.5)
ALP SERPL-CCNC: 66 U/L (ref 42–141)
ALP SERPL-CCNC: 67 U/L (ref 42–141)
B-HCG UR QL: NEGATIVE
BILIRUB SERPL-MCNC: 0.6 MG/DL (ref 0.2–1.6)
BILIRUB SERPL-MCNC: 0.8 MG/DL (ref 0.2–1.6)
BILIRUBIN, POC UA: NEGATIVE
BLOOD, POC UA: ABNORMAL
BUN SERPL-MCNC: 9 MG/DL (ref 7–22)
CALCIUM SERPL-MCNC: 9 MG/DL (ref 8–10.3)
CHLORIDE SERPL-SCNC: 106 MMOL/L (ref 98–108)
CLARITY, POC UA: CLEAR
COLOR, POC UA: YELLOW
CREAT SERPL-MCNC: 0.8 MG/DL (ref 0.6–1.2)
CTP QC/QA: YES
GLUCOSE SERPL-MCNC: 128 MG/DL (ref 73–118)
GLUCOSE, POC UA: NEGATIVE
KETONES, POC UA: ABNORMAL
LEUKOCYTE EST, POC UA: NEGATIVE
NITRITE, POC UA: NEGATIVE
PH UR STRIP: 7 [PH]
POC ALT (SGPT): 40 U/L (ref 10–47)
POC ALT (SGPT): 42 U/L (ref 10–47)
POC AMYLASE: 58 U/L (ref 14–97)
POC AST (SGOT): 58 U/L (ref 11–38)
POC AST (SGOT): 61 U/L (ref 11–38)
POC CARDIAC TROPONIN I: 0 NG/ML
POC GGT: 11 U/L (ref 5–65)
POC TCO2: 24 MMOL/L (ref 18–33)
POTASSIUM BLD-SCNC: 4 MMOL/L (ref 3.6–5.1)
PROTEIN, POC UA: ABNORMAL
PROTEIN, POC: 7.2 G/DL (ref 6.4–8.1)
PROTEIN, POC: 7.5 G/DL (ref 6.4–8.1)
SAMPLE: NORMAL
SODIUM BLD-SCNC: 139 MMOL/L (ref 128–145)
SPECIFIC GRAVITY, POC UA: >=1.03
UROBILINOGEN, POC UA: 0.2 E.U./DL

## 2019-08-31 PROCEDURE — 81003 URINALYSIS AUTO W/O SCOPE: CPT | Mod: ER

## 2019-08-31 PROCEDURE — 96372 THER/PROPH/DIAG INJ SC/IM: CPT | Mod: 59,ER

## 2019-08-31 PROCEDURE — 96374 THER/PROPH/DIAG INJ IV PUSH: CPT | Mod: ER

## 2019-08-31 PROCEDURE — 63600175 PHARM REV CODE 636 W HCPCS: Mod: ER | Performed by: EMERGENCY MEDICINE

## 2019-08-31 PROCEDURE — 96375 TX/PRO/DX INJ NEW DRUG ADDON: CPT | Mod: ER

## 2019-08-31 PROCEDURE — 99291 CRITICAL CARE FIRST HOUR: CPT | Mod: 25,ER

## 2019-08-31 PROCEDURE — 99292 CRITICAL CARE ADDL 30 MIN: CPT | Mod: ER

## 2019-08-31 PROCEDURE — 93010 ELECTROCARDIOGRAM REPORT: CPT | Mod: ,,, | Performed by: INTERNAL MEDICINE

## 2019-08-31 PROCEDURE — 96361 HYDRATE IV INFUSION ADD-ON: CPT | Mod: ER

## 2019-08-31 PROCEDURE — 80053 COMPREHEN METABOLIC PANEL: CPT | Mod: ER

## 2019-08-31 PROCEDURE — 81025 URINE PREGNANCY TEST: CPT | Mod: ER | Performed by: EMERGENCY MEDICINE

## 2019-08-31 PROCEDURE — 93010 EKG 12-LEAD: ICD-10-PCS | Mod: ,,, | Performed by: INTERNAL MEDICINE

## 2019-08-31 PROCEDURE — 84484 ASSAY OF TROPONIN QUANT: CPT | Mod: ER

## 2019-08-31 PROCEDURE — 93005 ELECTROCARDIOGRAM TRACING: CPT | Mod: ER

## 2019-08-31 PROCEDURE — 82150 ASSAY OF AMYLASE: CPT | Mod: ER

## 2019-08-31 PROCEDURE — 85025 COMPLETE CBC W/AUTO DIFF WBC: CPT | Mod: ER

## 2019-08-31 RX ORDER — DICYCLOMINE HYDROCHLORIDE 10 MG/ML
20 INJECTION INTRAMUSCULAR
Status: COMPLETED | OUTPATIENT
Start: 2019-08-31 | End: 2019-08-31

## 2019-08-31 RX ORDER — KETOROLAC TROMETHAMINE 30 MG/ML
30 INJECTION, SOLUTION INTRAMUSCULAR; INTRAVENOUS
Status: COMPLETED | OUTPATIENT
Start: 2019-08-31 | End: 2019-08-31

## 2019-08-31 RX ORDER — ONDANSETRON 2 MG/ML
4 INJECTION INTRAMUSCULAR; INTRAVENOUS
Status: COMPLETED | OUTPATIENT
Start: 2019-08-31 | End: 2019-08-31

## 2019-08-31 RX ORDER — ONDANSETRON 4 MG/1
4 TABLET, FILM COATED ORAL EVERY 6 HOURS PRN
Qty: 12 TABLET | Refills: 0 | Status: SHIPPED | OUTPATIENT
Start: 2019-08-31 | End: 2019-09-03

## 2019-08-31 RX ADMIN — SODIUM CHLORIDE 1000 ML: 0.9 INJECTION, SOLUTION INTRAVENOUS at 04:08

## 2019-08-31 RX ADMIN — ONDANSETRON 4 MG: 2 INJECTION INTRAMUSCULAR; INTRAVENOUS at 04:08

## 2019-08-31 RX ADMIN — DICYCLOMINE HYDROCHLORIDE 20 MG: 20 INJECTION, SOLUTION INTRAMUSCULAR at 06:08

## 2019-08-31 RX ADMIN — SODIUM CHLORIDE 1000 ML: 0.9 INJECTION, SOLUTION INTRAVENOUS at 05:08

## 2019-08-31 RX ADMIN — KETOROLAC TROMETHAMINE 30 MG: 30 INJECTION, SOLUTION INTRAMUSCULAR at 04:08

## 2019-08-31 NOTE — ED PROVIDER NOTES
Encounter Date: 8/31/2019    SCRIBE #1 NOTE: I, Janelle Rm , am scribing for, and in the presence of,  Dr. Ndiaye . I have scribed the following portions of the note - Other sections scribed: HPI, ROS, PE.       History     Chief Complaint   Patient presents with    Emesis     Pt c/o vomiting onset this morning about 0800.  Reports stomach pain from throwing up.  Also c/o diarrhea.     CC: N/V/D  24 y.o female presents to the ED with N/V/D and generalized abdominal pain since this morning. She reports multiple episodes of of vomiting and diarrhea. She states having had tjis complaint a few times a year. She notes she feels weak. She denies fever, cold symptoms, HA, chest pain, SOB, changes in urination, or dizziness. No ill contacts. Occasional EtOH use. Frequent marijuana use.         Review of patient's allergies indicates:  No Known Allergies  Past Medical History:   Diagnosis Date    Asthma     Blood clot associated with vein wall inflammation     History of broken nose      Past Surgical History:   Procedure Laterality Date    CHOLECYSTECTOMY  2010    LABIAPLASTY N/A 2/1/2017    [facility]    RECONSTRUCTION-NASAL Bilateral 5/29/2018    [facility]formed [facility] OR 2ND FLR    REDUCTION-CLOSED-FRACTURE-NASAL Septal N/A 4/17/2018    Performed by [facility]chi Duke [facility] at [facility] OR 2ND FLR     Family History   Problem Relation Age of Onset    Hypertension Mother     No Known Problems Brother     No Known Problems Brother     No Known Problems Brother     No Known Problems Father     No Known Problems Sister     No Known Problems Maternal Aunt     No Known Problems Maternal Uncle     No Known Problems Paternal Aunt     No Known Problems Paternal Uncle     No Known Problems Maternal Grandmother     No Known Problems Maternal Grandfather     No Known Problems Paternal Grandmother     No Known Problems Paternal Grandfather     Breast cancer Neg Hx      Colon cancer Neg Hx     Ovarian cancer Neg Hx     Amblyopia Neg Hx     Blindness Neg Hx     Cancer Neg Hx     Cataracts Neg Hx     Diabetes Neg Hx     Glaucoma Neg Hx     Macular degeneration Neg Hx     Retinal detachment Neg Hx     Strabismus Neg Hx     Stroke Neg Hx     Thyroid disease Neg Hx      Social History     Tobacco Use    Smoking status: Current Some Day Smoker     Types: Vaping w/o nicotine    Smokeless tobacco: Never Used   Substance Use Topics    Alcohol use: Yes     Comment: occasionally    Drug use: No     Review of Systems   Constitutional: Negative for fever.   Respiratory: Negative for shortness of breath.    Cardiovascular: Negative for chest pain.   Gastrointestinal: Positive for diarrhea, nausea and vomiting. Negative for abdominal pain.   Genitourinary: Negative for decreased urine volume.   Neurological: Positive for weakness. Negative for dizziness and headaches.   All other systems reviewed and are negative.      Physical Exam     Initial Vitals [08/31/19 1549]   BP Pulse Resp Temp SpO2   (!) 141/88 (!) 49 16 97.3 °F (36.3 °C) 100 %      MAP       --         Physical Exam    Nursing note and vitals reviewed.  Constitutional: She appears well-developed and well-nourished. She is not diaphoretic. She appears distressed.   HENT:   Head: Normocephalic and atraumatic.   Eyes: EOM are normal.   Neck: Normal range of motion.   Cardiovascular: Normal rate, regular rhythm and normal heart sounds. Exam reveals no gallop and no friction rub.    No murmur heard.  Pulmonary/Chest: Breath sounds normal. No respiratory distress. She has no wheezes. She has no rhonchi. She has no rales.   Abdominal: Soft. There is generalized tenderness.   Musculoskeletal: Normal range of motion.   Neurological: She is alert and oriented to person, place, and time. No cranial nerve deficit or sensory deficit.   Skin: Skin is warm and dry. Capillary refill takes less than 2 seconds.   Psychiatric: She  has a normal mood and affect. Her behavior is normal.         ED Course   Critical Care  Date/Time: 9/1/2019 8:03 AM  Performed by: Alida Ndiaye MD  Authorized by: Lauri Marinelli MD   Direct patient critical care time: 60 minutes  Additional history critical care time: 5 minutes  Ordering / reviewing critical care time: 5 minutes  Documentation critical care time: 5 minutes  Consulting other physicians critical care time: 5 minutes  Total critical care time (exclusive of procedural time) : 80 minutes  Critical care was necessary to treat or prevent imminent or life-threatening deterioration of the following conditions: 3 L of IV fluids and  bradycardia.  Critical care was time spent personally by me on the following activities: evaluation of patient's response to treatment, ordering and performing treatments and interventions, ordering and review of laboratory studies, examination of patient, development of treatment plan with patient or surrogate, review of old charts, re-evaluation of patient's condition and pulse oximetry.        Labs Reviewed   POCT URINALYSIS W/O SCOPE - Abnormal; Notable for the following components:       Result Value    Glucose, UA Negative (*)     Bilirubin, UA Negative (*)     Ketones, UA Trace (*)     Spec Grav UA >=1.030 (*)     Blood, UA Trace-intact (*)     Protein, UA 2+ (*)     Nitrite, UA Negative (*)     Leukocytes, UA Negative (*)     All other components within normal limits   POCT LIVER PANEL - Abnormal; Notable for the following components:    AST (SGOT), POC 61 (*)     All other components within normal limits   POCT CMP - Abnormal; Notable for the following components:    AST (SGOT), POC 58 (*)     POC Glucose 128 (*)     All other components within normal limits   TROPONIN ISTAT   POCT URINE PREGNANCY   POCT URINALYSIS W/O SCOPE   POCT CBC   POCT CMP   POCT AMYLASE   POCT TROPONIN     EKG Readings: (Independently Interpreted)   Sinus bradycardia, heart rate 41,  early repolarization, normal QT, normal axis   Other EKG Interpretations: 16:11 sinus bradycardia, heart rate 47, no ST segment elevation, normal QT, normal axis  1844:  Sinus bradycardia, heart rate 57, no ST segment elevation, slightly prolonged QT, normal axis       Imaging Results    None          Medical Decision Making:   Initial Assessment:   24 y.o female presents to the ED with N/V/D and abdominal pain. Physical exam findings are significant for diffuse abdominal tenderness and patient appears distressed.No distension, rebound, or guarding. Heart and lungs are normal.   Clinical Tests:   Lab Tests: Ordered and Reviewed  Medical Tests: Ordered and Reviewed  ED Management:  Patient's lab showed no significant abnormalities.  She is still bradycardic.  She will be given a 2nd L of fluid and repeat EKG will be done.  She denies chest pain. Patient does admit to frequent marijuana use.  Patient's lab showed no significant abnormalities.  Patient feels somewhat better after the IV fluids.  Heart rate has improved.  She denies chest pain or shortness of breath. Patient was given 3 L of IV fluids.  Patient was turned over to Dr. wilson at 7:00 p.m. for final disposition.  Patient ambulated throughout the ED and did not have weakness or any other symptoms.            Scribe Attestation:   Scribe #1: I performed the above scribed service and the documentation accurately describes the services I performed. I attest to the accuracy of the note.       I, Dr. Alida Ndiaye, personally performed the services described in this documentation. All medical record entries made by the scribe were at my direction and in my presence.  I have reviewed the chart and agree that the record reflects my personal performance and is accurate and complete. Alida Ndiaye MD.  5:46 PM 09/01/2019             Clinical Impression:     1. Non-intractable cyclical vomiting with nausea    2. Bradycardia    3. Bradycardia, sinus                                    Alida Ndiaye MD  08/31/19 1747       Alida Ndiaye MD  09/01/19 0879

## 2019-09-01 ENCOUNTER — HOSPITAL ENCOUNTER (EMERGENCY)
Facility: HOSPITAL | Age: 24
Discharge: HOME | End: 2019-09-01
Attending: EMERGENCY MEDICINE
Payer: MEDICAID

## 2019-09-01 VITALS
OXYGEN SATURATION: 100 % | TEMPERATURE: 98 F | HEART RATE: 58 BPM | SYSTOLIC BLOOD PRESSURE: 127 MMHG | DIASTOLIC BLOOD PRESSURE: 85 MMHG | HEIGHT: 66 IN | BODY MASS INDEX: 20.73 KG/M2 | WEIGHT: 129 LBS | RESPIRATION RATE: 16 BRPM

## 2019-09-01 DIAGNOSIS — R00.1 BRADYCARDIA: ICD-10-CM

## 2019-09-01 DIAGNOSIS — R10.12 INTERMITTENT LEFT UPPER QUADRANT ABDOMINAL PAIN: Primary | ICD-10-CM

## 2019-09-01 LAB
ALBUMIN SERPL-MCNC: 3.7 G/DL (ref 3.3–5.5)
ALBUMIN SERPL-MCNC: 4 G/DL (ref 3.3–5.5)
ALP SERPL-CCNC: 60 U/L (ref 42–141)
ALP SERPL-CCNC: 60 U/L (ref 42–141)
BILIRUB SERPL-MCNC: 0.8 MG/DL (ref 0.2–1.6)
BILIRUB SERPL-MCNC: 0.9 MG/DL (ref 0.2–1.6)
BUN SERPL-MCNC: 10 MG/DL (ref 7–22)
CALCIUM SERPL-MCNC: 8.9 MG/DL (ref 8–10.3)
CHLORIDE SERPL-SCNC: 105 MMOL/L (ref 98–108)
CREAT SERPL-MCNC: 0.8 MG/DL (ref 0.6–1.2)
GLUCOSE SERPL-MCNC: 100 MG/DL (ref 73–118)
POC ALT (SGPT): 37 U/L (ref 10–47)
POC ALT (SGPT): 38 U/L (ref 10–47)
POC AMYLASE: 64 U/L (ref 14–97)
POC AST (SGOT): 41 U/L (ref 11–38)
POC AST (SGOT): 44 U/L (ref 11–38)
POC CARDIAC TROPONIN I: 0 NG/ML
POC GGT: 11 U/L (ref 5–65)
POC TCO2: 25 MMOL/L (ref 18–33)
POTASSIUM BLD-SCNC: 3.9 MMOL/L (ref 3.6–5.1)
PROTEIN, POC: 6.2 G/DL (ref 6.4–8.1)
PROTEIN, POC: 6.5 G/DL (ref 6.4–8.1)
SAMPLE: NORMAL
SODIUM BLD-SCNC: 134 MMOL/L (ref 128–145)

## 2019-09-01 PROCEDURE — 84484 ASSAY OF TROPONIN QUANT: CPT | Mod: ER

## 2019-09-01 PROCEDURE — 96372 THER/PROPH/DIAG INJ SC/IM: CPT | Mod: 59,ER

## 2019-09-01 PROCEDURE — 25500020 PHARM REV CODE 255: Mod: ER | Performed by: EMERGENCY MEDICINE

## 2019-09-01 PROCEDURE — 85025 COMPLETE CBC W/AUTO DIFF WBC: CPT | Mod: ER

## 2019-09-01 PROCEDURE — 80053 COMPREHEN METABOLIC PANEL: CPT | Mod: ER

## 2019-09-01 PROCEDURE — 82150 ASSAY OF AMYLASE: CPT | Mod: ER

## 2019-09-01 PROCEDURE — 96374 THER/PROPH/DIAG INJ IV PUSH: CPT | Mod: ER

## 2019-09-01 PROCEDURE — 93005 ELECTROCARDIOGRAM TRACING: CPT | Mod: ER

## 2019-09-01 PROCEDURE — 99285 EMERGENCY DEPT VISIT HI MDM: CPT | Mod: 25,ER

## 2019-09-01 PROCEDURE — 93010 EKG 12-LEAD: ICD-10-PCS | Mod: ,,, | Performed by: INTERNAL MEDICINE

## 2019-09-01 PROCEDURE — 93010 ELECTROCARDIOGRAM REPORT: CPT | Mod: ,,, | Performed by: INTERNAL MEDICINE

## 2019-09-01 PROCEDURE — 81003 URINALYSIS AUTO W/O SCOPE: CPT | Mod: ER

## 2019-09-01 PROCEDURE — 63600175 PHARM REV CODE 636 W HCPCS: Mod: ER | Performed by: EMERGENCY MEDICINE

## 2019-09-01 RX ORDER — DICYCLOMINE HYDROCHLORIDE 20 MG/1
20 TABLET ORAL 2 TIMES DAILY
Qty: 30 TABLET | Refills: 0 | Status: SHIPPED | OUTPATIENT
Start: 2019-09-01 | End: 2019-09-16

## 2019-09-01 RX ORDER — KETOROLAC TROMETHAMINE 30 MG/ML
30 INJECTION, SOLUTION INTRAMUSCULAR; INTRAVENOUS
Status: COMPLETED | OUTPATIENT
Start: 2019-09-01 | End: 2019-09-01

## 2019-09-01 RX ORDER — DICYCLOMINE HYDROCHLORIDE 10 MG/ML
20 INJECTION INTRAMUSCULAR
Status: COMPLETED | OUTPATIENT
Start: 2019-09-01 | End: 2019-09-01

## 2019-09-01 RX ADMIN — IOHEXOL 75 ML: 350 INJECTION, SOLUTION INTRAVENOUS at 05:09

## 2019-09-01 RX ADMIN — KETOROLAC TROMETHAMINE 30 MG: 30 INJECTION, SOLUTION INTRAMUSCULAR at 05:09

## 2019-09-01 RX ADMIN — DICYCLOMINE HYDROCHLORIDE 20 MG: 20 INJECTION, SOLUTION INTRAMUSCULAR at 05:09

## 2019-09-01 NOTE — LETTER
[address] [facility]  [place] [address]  Phone: [phone]  Fax: [phone]   Dr. Zuniga   [facility]!  This is the patient that I spoke to you about on Sunday with the bradycardia.  I appreciate that you will follow her up in your office on Tuesday.  Thank you again, Alida

## 2019-09-01 NOTE — DISCHARGE INSTRUCTIONS
Call 's  office on Tuesday and let them know you were in the emergency room and he wants to see you on Tuesday for a Holter monitor.  Advance diet as tolerated.  Rest.  Drink plenty of fluids.  Return here at any time.  Call your doctor for close follow-up

## 2019-09-01 NOTE — ED NOTES
Pt stable at this time, AAO x 4, sitting up in bed, complaining of abdominal cramping, BP stable, sinus amber with HR 40's-50's. Pt states she takes anxiety medicine and her HR is usually high when she goes to the doctor. Pt ambulated through ED hallway, stable gait, no dizziness, has some nausea with abdominal cramping.

## 2019-09-01 NOTE — ED PROVIDER NOTES
Encounter Date: 9/1/2019    SCRIBE #1 NOTE: I, Arvin Dimas, am scribing for, and in the presence of,  Dr. Ndiaye. I have scribed the following portions of the note - Other sections scribed: HPI, ROS, PE.       History     Chief Complaint   Patient presents with    Abdominal Pain     seen here yesterday for same, with vomiting, states vomiting is relieved with Rx zofran given but pt still has pain     24 year old female complaining of fatigue and abdominal pain. She denies vomiting, fever, or nausea. . Patient has not eaten anything today, but has had fluid intake. Patient has had this pain before and was diagnosed with left intestine infection in the distant past.  Was seen in ED yesterday with  vomiting/nausea, diarrhea, and abdominal pain. She reports symptoms have improved since.  She said that she is taking Zofran with improvement of the nausea but her abdominal pain continues.  She denies chest pain or shortness of breath.  Overall she feels much better than when she was discharged last night.    The history is provided by the patient. No  was used.     Review of patient's allergies indicates:  No Known Allergies  Past Medical History:   Diagnosis Date    Asthma     Blood clot associated with vein wall inflammation     History of broken nose      Past Surgical History:   Procedure Laterality Date    CHOLECYSTECTOMY  2010    LABIAPLASTY N/A 2/1/2017    [facility]    RECONSTRUCTION-NASAL Bilateral 5/29/2018    Performed by [facility]gs [facility] at [facility] OR [facility]    REDUCTION-CLOSED-FRACTURE-NASAL Septal N/A 4/17/2018    Performed by Kelechi Duke MD at [facility] OR 2ND [facility]     Family History   Problem Relation Age of Onset    Hypertension Mother     No Known Problems Brother     No Known Problems Brother     No Known Problems Brother     No Known Problems Father     No Known Problems Sister     No Known Problems Maternal Aunt     No Known  Problems Maternal Uncle     No Known Problems Paternal Aunt     No Known Problems Paternal Uncle     No Known Problems Maternal Grandmother     No Known Problems Maternal Grandfather     No Known Problems Paternal Grandmother     No Known Problems Paternal Grandfather     Breast cancer Neg Hx     Colon cancer Neg Hx     Ovarian cancer Neg Hx     Amblyopia Neg Hx     Blindness Neg Hx     Cancer Neg Hx     Cataracts Neg Hx     Diabetes Neg Hx     Glaucoma Neg Hx     Macular degeneration Neg Hx     Retinal detachment Neg Hx     Strabismus Neg Hx     Stroke Neg Hx     Thyroid disease Neg Hx      Social History     Tobacco Use    Smoking status: Current Some Day Smoker     Types: Vaping w/o nicotine    Smokeless tobacco: Never Used   Substance Use Topics    Alcohol use: Yes     Comment: occasionally    Drug use: No     Review of Systems   Constitutional: Positive for fatigue. Negative for fever.   Respiratory: Negative for shortness of breath.    Cardiovascular: Negative for chest pain.   Gastrointestinal: Positive for abdominal pain and blood in stool ( Trace blood with strain). Negative for diarrhea, nausea and vomiting.   Neurological: Negative for headaches.   All other systems reviewed and are negative.      Physical Exam     Initial Vitals [09/01/19 1645]   BP Pulse Resp Temp SpO2   127/85 (!) 55 17 98.2 °F (36.8 °C) 100 %      MAP       --         Physical Exam    Nursing note and vitals reviewed.  Constitutional: She appears well-developed and well-nourished.   HENT:   Head: Normocephalic and atraumatic.   Right Ear: External ear normal.   Left Ear: External ear normal.   Eyes: Conjunctivae are normal.   Neck: Neck supple.   Cardiovascular: Regular rhythm. Exam reveals no gallop and no friction rub.    No murmur heard.  Pulmonary/Chest: Breath sounds normal. No respiratory distress. She has no wheezes. She has no rhonchi. She has no rales.   Abdominal: Soft. Normal appearance and bowel  sounds are normal. She exhibits no distension and no mass. There is tenderness in the left upper quadrant. There is no rebound and no guarding.       Neurological: She is alert and oriented to person, place, and time.   Skin: Skin is warm and dry.   Psychiatric: She has a normal mood and affect.         ED Course   Procedures  Labs Reviewed   POCT LIVER PANEL - Abnormal; Notable for the following components:       Result Value    AST (SGOT), POC 44 (*)     All other components within normal limits   POCT CMP - Abnormal; Notable for the following components:    AST (SGOT), POC 41 (*)     Protein 6.2 (*)     All other components within normal limits   TROPONIN ISTAT   POCT URINALYSIS W/O SCOPE   POCT CBC   POCT CMP   POCT AMYLASE   POCT TROPONIN     EKG Readings: (Independently Interpreted)   Sinus bradycardia, heart rate 48, no ST segment elevation, normal QT, normal axis       Imaging Results          CT Abdomen Pelvis With Contrast (Final result)  Result time 09/01/19 17:47:11    Final result by Jai Garza MD (09/01/19 17:47:11)                 Impression:      1. No acute intra-abdominal abnormalities identified.  2. Hepatomegaly.  3. Cholecystectomy.      Electronically signed by: Jai Garza MD  Date:    09/01/2019  Time:    17:47             Narrative:    EXAMINATION:  CT ABDOMEN PELVIS WITH CONTRAST    CLINICAL HISTORY:  LLQ pain, suspect diverticulitis;    TECHNIQUE:  Low dose axial images, sagittal and coronal reformations were obtained from the lung bases to the pubic symphysis following the IV administration of 75 mL of Omnipaque 350 .  Oral contrast was not given.    COMPARISON:  CT abdomen and pelvis from March 2018.    FINDINGS:  The visualized portion of the heart is unremarkable.  The lung bases are clear.    No significant hepatic abnormalities are identified.  Liver is enlarged measuring 22 cm.  There is no intra-or extrahepatic biliary ductal dilatation.  Gallbladder has been removed.   The stomach, pancreas, spleen, and adrenal glands are unremarkable.    Kidneys enhance normally with no evidence of hydronephrosis.  Urinary bladder is nondistended.  Uterus is unremarkable.  Small right ovarian cyst or dominant follicle is seen measuring 2.5 cm which is within normal limits in size for a patient of this age.  Small volume dependent physiologic free fluid is seen within the pelvis.    Appendix is visualized and is unremarkable.  The visualized loops of small and large bowel show no evidence of obstruction or inflammation.  No free air.    Aorta tapers normally.    No acute osseous abnormality identified. Subcutaneous soft tissue structures are unremarkable.                                 Medical Decision Making:   Initial Assessment:   24 year old female complaining of fatigue and abdominal pain. Patient with blood in stool and decrease in appetite. Physical exam is significant for abdominal pain.  Patient has mild tenderness to the left upper quadrant without guarding or rebound  ED Management:  Will order POCT CMP, POCT amylase, POCT urinalysis, and POCT CBC. Will order CT abdomen pelvis with contrast.   Will treat with  iohexol (OMNIPAQUE 350) injection 75 mL, ketorolac injection 30 mg, and dicyclomine injection 20 mg  Patient's CT showed no significant abnormalities.  No evidence of diverticulitis.  Patient did have an elevated white blood cell count of 34179.  However I cannot detect any evidence of a bacterial infection.  Patient appears much better than her presentation yesterday.  She reported relief with Bentyl.  She continues to be bradycardic.  I did talk to Dr. Zuniga  with Cardiology.  He will see her in his office in 2 days for a Holter monitor.  Patient understands.  She appears quite well                Scribe Attestation:   Scribe #1: I performed the above scribed service and the documentation accurately describes the services I performed. I attest to the accuracy of the  note.       I, Dr. Alida Ndiaye, personally performed the services described in this documentation. All medical record entries made by the scribe were at my direction and in my presence.  I have reviewed the chart and agree that the record reflects my personal performance and is accurate and complete. Alida Ndiaye MD.  6:32 PM 09/01/2019             Clinical Impression:     1. Intermittent left upper quadrant abdominal pain    2. Bradycardia                                   Alida Ndiaye MD  09/01/19 6098

## 2019-09-03 ENCOUNTER — OFFICE VISIT (OUTPATIENT)
Dept: CARDIOLOGY | Facility: CLINIC | Age: 24
End: 2019-09-03
Payer: MEDICAID

## 2019-09-03 VITALS
DIASTOLIC BLOOD PRESSURE: 81 MMHG | WEIGHT: 127.88 LBS | HEIGHT: 66 IN | OXYGEN SATURATION: 98 % | SYSTOLIC BLOOD PRESSURE: 124 MMHG | BODY MASS INDEX: 20.55 KG/M2 | HEART RATE: 57 BPM

## 2019-09-03 DIAGNOSIS — G43.009 MIGRAINE WITHOUT AURA AND WITHOUT STATUS MIGRAINOSUS, NOT INTRACTABLE: ICD-10-CM

## 2019-09-03 DIAGNOSIS — R07.9 CHEST PAIN: ICD-10-CM

## 2019-09-03 DIAGNOSIS — R00.1 BRADYCARDIA, SINUS: Primary | ICD-10-CM

## 2019-09-03 PROCEDURE — 99204 PR OFFICE/OUTPT VISIT, NEW, LEVL IV, 45-59 MIN: ICD-10-PCS | Mod: S$PBB,,, | Performed by: INTERNAL MEDICINE

## 2019-09-03 PROCEDURE — 93000 EKG 12-LEAD: ICD-10-PCS | Mod: S$GLB,,, | Performed by: INTERNAL MEDICINE

## 2019-09-03 PROCEDURE — 99204 OFFICE O/P NEW MOD 45 MIN: CPT | Mod: S$PBB,,, | Performed by: INTERNAL MEDICINE

## 2019-09-03 PROCEDURE — 93005 ELECTROCARDIOGRAM TRACING: CPT | Mod: PBBFAC | Performed by: INTERNAL MEDICINE

## 2019-09-03 PROCEDURE — 99213 OFFICE O/P EST LOW 20 MIN: CPT | Mod: PBBFAC | Performed by: INTERNAL MEDICINE

## 2019-09-03 PROCEDURE — 99999 PR PBB SHADOW E&M-EST. PATIENT-LVL III: ICD-10-PCS | Mod: PBBFAC,,, | Performed by: INTERNAL MEDICINE

## 2019-09-03 PROCEDURE — 93000 ELECTROCARDIOGRAM COMPLETE: CPT | Mod: S$GLB,,, | Performed by: INTERNAL MEDICINE

## 2019-09-03 PROCEDURE — 99999 PR PBB SHADOW E&M-EST. PATIENT-LVL III: CPT | Mod: PBBFAC,,, | Performed by: INTERNAL MEDICINE

## 2019-09-03 NOTE — PROGRESS NOTES
Subjective:    Patient ID:  Mackenzie Smart is a 24 y.o. female who presents for evaluation of Hospital Follow Up      HPI     Went to the ER 9/1/19  24 y.o female presents to the ED with N/V/D and generalized abdominal pain since this morning. She reports multiple episodes of of vomiting and diarrhea. She states having had tjis complaint a few times a year. She notes she feels weak. She denies fever, cold symptoms, HA, chest pain, SOB, changes in urination, or dizziness. No ill contacts. Occasional EtOH use. Frequent marijuana use.     ED Management:  Patient's lab showed no significant abnormalities.  She is still bradycardic.  She will be given a 2nd L of fluid and repeat EKG will be done.  She denies chest pain. Patient does admit to frequent marijuana use.  Patient's lab showed no significant abnormalities.  Patient feels somewhat better after the IV fluids.  Heart rate has improved.  She denies chest pain or shortness of breath. Patient was given 3 L of IV fluids.  Patient was turned over to Dr. wilson at 7:00 p.m. for final disposition.  Patient ambulated throughout the ED and did not have weakness or any other symptoms.    EKG 9/1/19 sinus bradycardia 48 LVH by voltage    Reports feeling weak and depressed  Denies syncope  EKG sinus bradycardia 49 otherwise ok    Review of Systems   Constitution: Negative for decreased appetite.   HENT: Negative for ear discharge.    Eyes: Negative for blurred vision.   Respiratory: Negative for hemoptysis.    Endocrine: Negative for polyphagia.   Hematologic/Lymphatic: Negative for adenopathy.   Skin: Negative for color change.   Musculoskeletal: Negative for joint swelling.   Genitourinary: Negative for bladder incontinence.   Neurological: Negative for brief paralysis.   Psychiatric/Behavioral: Negative for hallucinations.   Allergic/Immunologic: Negative for hives.        Objective:    Physical Exam   Constitutional: She is oriented to person, place, and time. She appears  well-developed and well-nourished.   HENT:   Head: Normocephalic and atraumatic.   Eyes: Pupils are equal, round, and reactive to light. Conjunctivae are normal.   Neck: Normal range of motion. Neck supple.   Cardiovascular: Normal rate, normal heart sounds and intact distal pulses.   Pulmonary/Chest: Effort normal and breath sounds normal.   Abdominal: Soft. Bowel sounds are normal.   Musculoskeletal: Normal range of motion.   Neurological: She is alert and oriented to person, place, and time.   Skin: Skin is warm and dry.         Assessment:       1. Bradycardia, sinus    2. Migraine without aura and without status migrainosus, not intractable         Plan:       Will check an echo, holter and TSH for sinus bradycardia

## 2019-09-10 ENCOUNTER — HOSPITAL ENCOUNTER (OUTPATIENT)
Dept: CARDIOLOGY | Facility: HOSPITAL | Age: 24
Discharge: HOME | End: 2019-09-10
Attending: INTERNAL MEDICINE
Payer: MEDICAID

## 2019-09-10 DIAGNOSIS — R00.1 BRADYCARDIA, SINUS: ICD-10-CM

## 2019-09-10 DIAGNOSIS — G43.009 MIGRAINE WITHOUT AURA AND WITHOUT STATUS MIGRAINOSUS, NOT INTRACTABLE: ICD-10-CM

## 2019-09-10 LAB
AORTIC ROOT ANNULUS: 2.82 CM
AORTIC VALVE CUSP SEPERATION: 2.4 CM
ASCENDING AORTA: 2.84 CM
AV INDEX (PROSTH): 0.83
AV MEAN GRADIENT: 4 MMHG
AV PEAK GRADIENT: 8 MMHG
AV VALVE AREA: 3.2 CM2
AV VELOCITY RATIO: 0.85
CV ECHO LV RWT: 0.38 CM
DOP CALC AO PEAK VEL: 1.42 M/S
DOP CALC AO VTI: 27.43 CM
DOP CALC LVOT AREA: 3.8 CM2
DOP CALC LVOT DIAMETER: 2.21 CM
DOP CALC LVOT PEAK VEL: 1.2 M/S
DOP CALC LVOT STROKE VOLUME: 87.8 CM3
DOP CALCLVOT PEAK VEL VTI: 22.9 CM
E WAVE DECELERATION TIME: 194.7 MSEC
E/A RATIO: 1.88
E/E' RATIO: 8.39 M/S
ECHO LV POSTERIOR WALL: 0.89 CM (ref 0.6–1.1)
FRACTIONAL SHORTENING: 44 % (ref 28–44)
INTERVENTRICULAR SEPTUM: 1.11 CM (ref 0.6–1.1)
IVRT: 0.08 MSEC
LA MAJOR: 4.66 CM
LA MINOR: 5.03 CM
LA WIDTH: 2.95 CM
LEFT ATRIUM SIZE: 2.85 CM
LEFT ATRIUM VOLUME: 34.57 CM3
LEFT INTERNAL DIMENSION IN SYSTOLE: 2.6 CM (ref 2.1–4)
LEFT VENTRICLE DIASTOLIC VOLUME: 100.96 ML
LEFT VENTRICLE SYSTOLIC VOLUME: 24.67 ML
LEFT VENTRICULAR INTERNAL DIMENSION IN DIASTOLE: 4.67 CM (ref 3.5–6)
LEFT VENTRICULAR MASS: 162.75 G
LV LATERAL E/E' RATIO: 7.22 M/S
LV SEPTAL E/E' RATIO: 10 M/S
MV PEAK A VEL: 0.69 M/S
MV PEAK E VEL: 1.3 M/S
PISA TR MAX VEL: 2.15 M/S
PULM VEIN S/D RATIO: 1.35
PV PEAK D VEL: 0.51 M/S
PV PEAK S VEL: 0.69 M/S
PV PEAK VELOCITY: 1.16 CM/S
RA MAJOR: 5.08 CM
RA PRESSURE: 3 MMHG
RA WIDTH: 3.11 CM
RIGHT VENTRICULAR END-DIASTOLIC DIMENSION: 3.58 CM
RV TISSUE DOPPLER FREE WALL SYSTOLIC VELOCITY 1 (APICAL 4 CHAMBER VIEW): 17.16 CM/S
SINUS: 2.82 CM
STJ: 2.45 CM
TDI LATERAL: 0.18 M/S
TDI SEPTAL: 0.13 M/S
TDI: 0.16 M/S
TR MAX PG: 18 MMHG
TRICUSPID ANNULAR PLANE SYSTOLIC EXCURSION: 2.57 CM
TV REST PULMONARY ARTERY PRESSURE: 21 MMHG

## 2019-09-10 PROCEDURE — 93227 HOLTER MONITOR - 24 HOUR (CUPID ONLY): ICD-10-PCS | Mod: ,,, | Performed by: INTERNAL MEDICINE

## 2019-09-10 PROCEDURE — 93306 ECHO (CUPID ONLY): ICD-10-PCS | Mod: 26,,, | Performed by: INTERNAL MEDICINE

## 2019-09-10 PROCEDURE — 93227 XTRNL ECG REC<48 HR R&I: CPT | Mod: ,,, | Performed by: INTERNAL MEDICINE

## 2019-09-10 PROCEDURE — 93225 XTRNL ECG REC<48 HRS REC: CPT

## 2019-09-10 PROCEDURE — 93306 TTE W/DOPPLER COMPLETE: CPT | Mod: 26,,, | Performed by: INTERNAL MEDICINE

## 2019-09-10 PROCEDURE — 93306 TTE W/DOPPLER COMPLETE: CPT

## 2019-09-12 LAB
OHS CV EVENT MONITOR DAY: 1
OHS CV HOLTER LENGTH DECIMAL HOURS: 47.98
OHS CV HOLTER LENGTH HOURS: 23
OHS CV HOLTER LENGTH MINUTES: 59

## 2020-02-26 ENCOUNTER — HOSPITAL ENCOUNTER (EMERGENCY)
Facility: HOSPITAL | Age: 25
Discharge: HOME | End: 2020-02-26
Attending: INTERNAL MEDICINE
Payer: MEDICAID

## 2020-02-26 VITALS
HEIGHT: 65 IN | OXYGEN SATURATION: 100 % | DIASTOLIC BLOOD PRESSURE: 83 MMHG | SYSTOLIC BLOOD PRESSURE: 140 MMHG | RESPIRATION RATE: 18 BRPM | BODY MASS INDEX: 21.66 KG/M2 | WEIGHT: 130 LBS | HEART RATE: 88 BPM | TEMPERATURE: 98 F

## 2020-02-26 DIAGNOSIS — R11.2 NON-INTRACTABLE VOMITING WITH NAUSEA, UNSPECIFIED VOMITING TYPE: Primary | ICD-10-CM

## 2020-02-26 LAB
B-HCG UR QL: NEGATIVE
CTP QC/QA: YES

## 2020-02-26 PROCEDURE — 99283 EMERGENCY DEPT VISIT LOW MDM: CPT | Mod: ER

## 2020-02-26 PROCEDURE — 25000003 PHARM REV CODE 250: Mod: ER | Performed by: INTERNAL MEDICINE

## 2020-02-26 PROCEDURE — 81025 URINE PREGNANCY TEST: CPT | Mod: ER | Performed by: INTERNAL MEDICINE

## 2020-02-26 RX ORDER — ONDANSETRON 4 MG/1
8 TABLET, ORALLY DISINTEGRATING ORAL
Status: COMPLETED | OUTPATIENT
Start: 2020-02-26 | End: 2020-02-26

## 2020-02-26 RX ORDER — ONDANSETRON 4 MG/1
4 TABLET, ORALLY DISINTEGRATING ORAL EVERY 12 HOURS PRN
Qty: 10 TABLET | Refills: 0 | Status: SHIPPED | OUTPATIENT
Start: 2020-02-26

## 2020-02-26 RX ADMIN — ONDANSETRON 8 MG: 4 TABLET, ORALLY DISINTEGRATING ORAL at 03:02

## 2020-02-26 NOTE — ED NOTES
Pt noted to be asleep for discharge. Emesis bag pt has had in her possession since arrival remains empty.

## 2020-02-26 NOTE — ED PROVIDER NOTES
Encounter Date: 2/26/2020       History     Chief Complaint   Patient presents with    Vomiting     PT PRESENTS TO ER WITH C/O VOMITING AND DIARRHEA SINCE YESTERDAY AT 4PM.  HAS NOT BEEN ABLE TO KEEP ANYTHING DOWN.       24-year-old female presents to the emergency department complaining of nausea and vomiting a for the past 2 days after drinking heavily at a parade.    The history is provided by the patient. No  was used.     Review of patient's allergies indicates:  No Known Allergies  Past Medical History:   Diagnosis Date    Asthma     Blood clot associated with vein wall inflammation     History of broken nose      Past Surgical History:   Procedure Laterality Date    CHOLECYSTECTOMY  2010    RECONSTRUCTION OF NOSE Bilateral 5/29/2018    Procedure: RECONSTRUCTION-NASAL;  Surgeon: Yao Briggs MD;  Location: [facility] OR [address] [place] [address];  Service: ENT;  Laterality: Bilateral;  Septoplasty set, rhinoplasty set     Family History   Problem Relation Age of Onset    Hypertension Mother     No Known Problems Brother     No Known Problems Brother     No Known Problems Brother     No Known Problems Father     No Known Problems Sister     No Known Problems Maternal Aunt     No Known Problems Maternal Uncle     No Known Problems Paternal Aunt     No Known Problems Paternal Uncle     No Known Problems Maternal Grandmother     No Known Problems Maternal Grandfather     No Known Problems Paternal Grandmother     No Known Problems Paternal Grandfather     Breast cancer Neg Hx     Colon cancer Neg Hx     Ovarian cancer Neg Hx     Amblyopia Neg Hx     Blindness Neg Hx     Cancer Neg Hx     Cataracts Neg Hx     Diabetes Neg Hx     Glaucoma Neg Hx     Macular degeneration Neg Hx     Retinal detachment Neg Hx     Strabismus Neg Hx     Stroke Neg Hx     Thyroid disease Neg Hx      Social History     Tobacco Use    Smoking status: Current Some Day Smoker     Types: Vaping w/o nicotine     Smokeless tobacco: Never Used   Substance Use Topics    Alcohol use: Yes     Comment: occasionally    Drug use: No     Review of Systems   Constitutional: Negative for fever.   Gastrointestinal: Positive for nausea and vomiting. Negative for abdominal pain and diarrhea.        Loose stools   All other systems reviewed and are negative.      Physical Exam     Initial Vitals [02/26/20 0321]   BP Pulse Resp Temp SpO2   (!) 140/83 88 18 97.9 °F (36.6 °C) 100 %      MAP       --         Physical Exam    Nursing note and vitals reviewed.  Constitutional: She appears well-developed and well-nourished.   HENT:   Head: Normocephalic and atraumatic.   Right Ear: External ear normal.   Eyes: Conjunctivae are normal.   Neck: Normal range of motion.   Cardiovascular: Normal rate and regular rhythm.   Pulmonary/Chest: Breath sounds normal.   Abdominal: Soft. Bowel sounds are normal.   Musculoskeletal: Normal range of motion.   Neurological: She is alert.   Skin: Skin is warm and dry.   Psychiatric: She has a normal mood and affect.         ED Course   Procedures  Labs Reviewed   POCT URINE PREGNANCY          Imaging Results    None          Medical Decision Making:   ED Management:  Patient received Zofran in the emergency department and states she feels better after medication.  She is given a prescription for Zofran and advised to follow up with her primary care physician within the next 3 days for re-evaluation/return to the emergency department condition worsens.                                 Clinical Impression:       ICD-10-CM ICD-9-CM   1. Non-intractable vomiting with nausea, unspecified vomiting type R11.2 787.01         Disposition:   Disposition: Discharged  Condition: Stable     ED Disposition Condition    Discharge Stable        ED Prescriptions     Medication Sig Dispense Start Date End Date Auth. Provider    ondansetron (ZOFRAN-ODT) 4 MG TbDL Take 1 tablet (4 mg total) by mouth every 12 (twelve) hours as  needed. 10 tablet 2/26/2020  Lauri Marinelli MD        Follow-up Information     Follow up With Specialties Details Why Contact Info    Alida Varela MD Family Medicine Schedule an appointment as soon as possible for a visit in 1 day For reevaluation [address]  [place] [address]  [phone]                                       Lauri Marinelli MD  02/26/20 0516

## 2020-02-27 ENCOUNTER — HOSPITAL ENCOUNTER (EMERGENCY)
Facility: HOSPITAL | Age: 25
Discharge: HOME | End: 2020-02-27
Attending: INTERNAL MEDICINE
Payer: MEDICAID

## 2020-02-27 ENCOUNTER — TELEPHONE (OUTPATIENT)
Dept: EMERGENCY MEDICINE | Facility: HOSPITAL | Age: 25
End: 2020-02-27

## 2020-02-27 VITALS
HEIGHT: 66 IN | TEMPERATURE: 99 F | OXYGEN SATURATION: 100 % | DIASTOLIC BLOOD PRESSURE: 80 MMHG | WEIGHT: 130 LBS | BODY MASS INDEX: 20.89 KG/M2 | HEART RATE: 69 BPM | RESPIRATION RATE: 20 BRPM | SYSTOLIC BLOOD PRESSURE: 118 MMHG

## 2020-02-27 DIAGNOSIS — N83.202 CYSTS OF BOTH OVARIES: ICD-10-CM

## 2020-02-27 DIAGNOSIS — E87.6 HYPOKALEMIA: ICD-10-CM

## 2020-02-27 DIAGNOSIS — R11.2 NAUSEA VOMITING AND DIARRHEA: ICD-10-CM

## 2020-02-27 DIAGNOSIS — R10.84 GENERALIZED ABDOMINAL PAIN: Primary | ICD-10-CM

## 2020-02-27 DIAGNOSIS — N83.201 CYSTS OF BOTH OVARIES: ICD-10-CM

## 2020-02-27 DIAGNOSIS — R19.7 NAUSEA VOMITING AND DIARRHEA: ICD-10-CM

## 2020-02-27 LAB
ALBUMIN SERPL-MCNC: 4.5 G/DL (ref 3.3–5.5)
ALBUMIN SERPL-MCNC: 4.7 G/DL (ref 3.3–5.5)
ALP SERPL-CCNC: 70 U/L (ref 42–141)
ALP SERPL-CCNC: 73 U/L (ref 42–141)
B-HCG UR QL: NEGATIVE
BILIRUB SERPL-MCNC: 1.1 MG/DL (ref 0.2–1.6)
BILIRUB SERPL-MCNC: 1.1 MG/DL (ref 0.2–1.6)
BILIRUBIN, POC UA: ABNORMAL
BLOOD, POC UA: ABNORMAL
BUN SERPL-MCNC: 17 MG/DL (ref 7–22)
CALCIUM SERPL-MCNC: 10.2 MG/DL (ref 8–10.3)
CHLORIDE SERPL-SCNC: 103 MMOL/L (ref 98–108)
CLARITY, POC UA: CLEAR
COLOR, POC UA: ABNORMAL
CREAT SERPL-MCNC: 0.8 MG/DL (ref 0.6–1.2)
CTP QC/QA: YES
GLUCOSE SERPL-MCNC: 102 MG/DL (ref 73–118)
GLUCOSE, POC UA: NEGATIVE
KETONES, POC UA: ABNORMAL
LEUKOCYTE EST, POC UA: NEGATIVE
NITRITE, POC UA: NEGATIVE
PH UR STRIP: 6.5 [PH]
POC ALT (SGPT): 46 U/L (ref 10–47)
POC ALT (SGPT): 48 U/L (ref 10–47)
POC AMYLASE: 80 U/L (ref 14–97)
POC AST (SGOT): 48 U/L (ref 11–38)
POC AST (SGOT): 52 U/L (ref 11–38)
POC GGT: 15 U/L (ref 5–65)
POC TCO2: 27 MMOL/L (ref 18–33)
POTASSIUM BLD-SCNC: 3.2 MMOL/L (ref 3.6–5.1)
PROTEIN, POC UA: ABNORMAL
PROTEIN, POC: 8.2 G/DL (ref 6.4–8.1)
PROTEIN, POC: 8.2 G/DL (ref 6.4–8.1)
SODIUM BLD-SCNC: 141 MMOL/L (ref 128–145)
SPECIFIC GRAVITY, POC UA: >=1.03
UROBILINOGEN, POC UA: 1 E.U./DL

## 2020-02-27 PROCEDURE — 85025 COMPLETE CBC W/AUTO DIFF WBC: CPT | Mod: ER

## 2020-02-27 PROCEDURE — 81003 URINALYSIS AUTO W/O SCOPE: CPT | Mod: ER

## 2020-02-27 PROCEDURE — 25000003 PHARM REV CODE 250: Mod: ER | Performed by: NURSE PRACTITIONER

## 2020-02-27 PROCEDURE — 81025 URINE PREGNANCY TEST: CPT | Mod: ER | Performed by: INTERNAL MEDICINE

## 2020-02-27 PROCEDURE — 82150 ASSAY OF AMYLASE: CPT | Mod: ER

## 2020-02-27 PROCEDURE — 63600175 PHARM REV CODE 636 W HCPCS: Mod: ER | Performed by: NURSE PRACTITIONER

## 2020-02-27 PROCEDURE — 25500020 PHARM REV CODE 255: Mod: ER | Performed by: INTERNAL MEDICINE

## 2020-02-27 PROCEDURE — 80053 COMPREHEN METABOLIC PANEL: CPT | Mod: ER

## 2020-02-27 PROCEDURE — 96372 THER/PROPH/DIAG INJ SC/IM: CPT | Mod: 59,ER

## 2020-02-27 PROCEDURE — 96365 THER/PROPH/DIAG IV INF INIT: CPT | Mod: ER

## 2020-02-27 PROCEDURE — 99285 EMERGENCY DEPT VISIT HI MDM: CPT | Mod: 25,ER

## 2020-02-27 RX ORDER — DICYCLOMINE HYDROCHLORIDE 10 MG/ML
20 INJECTION INTRAMUSCULAR
Status: COMPLETED | OUTPATIENT
Start: 2020-02-27 | End: 2020-02-27

## 2020-02-27 RX ORDER — POTASSIUM CHLORIDE 20 MEQ/1
40 TABLET, EXTENDED RELEASE ORAL
Status: COMPLETED | OUTPATIENT
Start: 2020-02-27 | End: 2020-02-27

## 2020-02-27 RX ORDER — PROMETHAZINE HYDROCHLORIDE 25 MG/1
25 TABLET ORAL EVERY 6 HOURS PRN
Qty: 12 TABLET | Refills: 0 | Status: SHIPPED | OUTPATIENT
Start: 2020-02-27

## 2020-02-27 RX ORDER — DICYCLOMINE HYDROCHLORIDE 20 MG/1
20 TABLET ORAL 2 TIMES DAILY PRN
Qty: 10 TABLET | Refills: 0 | Status: SHIPPED | OUTPATIENT
Start: 2020-02-27 | End: 2020-03-28

## 2020-02-27 RX ORDER — POTASSIUM CHLORIDE 20 MEQ/1
40 TABLET, EXTENDED RELEASE ORAL DAILY
Qty: 4 TABLET | Refills: 0 | Status: SHIPPED | OUTPATIENT
Start: 2020-02-27 | End: 2020-02-29

## 2020-02-27 RX ADMIN — SODIUM CHLORIDE 1000 ML: 0.9 INJECTION, SOLUTION INTRAVENOUS at 10:02

## 2020-02-27 RX ADMIN — DICYCLOMINE HYDROCHLORIDE 20 MG: 20 INJECTION, SOLUTION INTRAMUSCULAR at 10:02

## 2020-02-27 RX ADMIN — PROMETHAZINE HYDROCHLORIDE 25 MG: 25 INJECTION INTRAMUSCULAR; INTRAVENOUS at 10:02

## 2020-02-27 RX ADMIN — POTASSIUM CHLORIDE 40 MEQ: 1500 TABLET, EXTENDED RELEASE ORAL at 11:02

## 2020-02-27 RX ADMIN — IOHEXOL 75 ML: 350 INJECTION, SOLUTION INTRAVENOUS at 11:02

## 2020-02-27 NOTE — DISCHARGE INSTRUCTIONS
§ Please return to the Emergency Department for any new or worsening symptoms including: fever, chest pain, shortness of breath, loss of consciousness, dizziness, weakness, or any other concerns.     § Schedule an appointment for follow up with your Primary Care Doctor as soon as possible for a recheck of your symptoms. If you do not have one, you may contact the one listed on your discharge paperwork or you may also call the [facility] Appointment Desk at [phone] to schedule an appointment with one.     § Please take all medication as prescribed.  Please chart Zofran 1st, if that does not help with nausea can try Phenergan. You have been prescribed Phenergan for pain. Please do not take this medication while working, drinking alcohol, swimming, or while driving/operating heavy machinery. This medication may cause drowsiness, dizziness, impair judgment, and reduce physical capabilities.You should not drive, operate heavy machinery, or make life changing decisions while taking this medication.      Bentyl as needed for abdominal pain or cramping.

## 2020-02-27 NOTE — ED PROVIDER NOTES
Encounter Date: 2/27/2020       History     Chief Complaint   Patient presents with    Abdominal Pain     onset 3 days, seen here for same,not getting better    Diarrhea    Vomiting    Nausea     CC: Vomiting/Abdominal Pain    HPI: Mackenzie Smart, a 24 y.o. female presents to the ED with a 3 day history of generalized abdominal pain with nausea, vomiting, and diarrhea.  She reports that she typically does not drink alcohol but did binge during the recent marker holiday.  She reports occasional marijuana use, was recently 3-4 days ago.  She reports she was seen here for similar symptoms and prescribed Zofran.  Zofran helps temporarily but nausea returns.  She is unable to eat or drink.  She is not able to hold down liquids.  She reports her abdominal pain is worsened as well. Other than Zofran, no other medications or treatment attempted.  She reports increased urinary frequency without dysuria.      The history is provided by the patient. No  was used.     Review of patient's allergies indicates:  No Known Allergies  Past Medical History:   Diagnosis Date    Asthma     Blood clot associated with vein wall inflammation     History of broken nose      Past Surgical History:   Procedure Laterality Date    CHOLECYSTECTOMY  2010    RECONSTRUCTION OF NOSE Bilateral 5/29/2018    Procedure: RECONSTRUCTION-NASAL;  Surgeon: Yao Briggs MD;  Location: [facility] OR [address] [place] [address];  Service: ENT;  Laterality: Bilateral;  Septoplasty set, rhinoplasty set     Family History   Problem Relation Age of Onset    Hypertension Mother     No Known Problems Brother     No Known Problems Brother     No Known Problems Brother     No Known Problems Father     No Known Problems Sister     No Known Problems Maternal Aunt     No Known Problems Maternal Uncle     No Known Problems Paternal Aunt     No Known Problems Paternal Uncle     No Known Problems Maternal Grandmother     No Known Problems Maternal  Grandfather     No Known Problems Paternal Grandmother     No Known Problems Paternal Grandfather     Breast cancer Neg Hx     Colon cancer Neg Hx     Ovarian cancer Neg Hx     Amblyopia Neg Hx     Blindness Neg Hx     Cancer Neg Hx     Cataracts Neg Hx     Diabetes Neg Hx     Glaucoma Neg Hx     Macular degeneration Neg Hx     Retinal detachment Neg Hx     Strabismus Neg Hx     Stroke Neg Hx     Thyroid disease Neg Hx      Social History     Tobacco Use    Smoking status: Current Some Day Smoker     Types: Vaping w/o nicotine    Smokeless tobacco: Never Used   Substance Use Topics    Alcohol use: Yes     Comment: occasionally    Drug use: Yes     Types: Marijuana     Comment: Smokes marijuana occasionally     Review of Systems   Constitutional: Negative for fever.   HENT: Negative for congestion, sore throat and trouble swallowing.    Respiratory: Negative for cough and shortness of breath.    Cardiovascular: Negative for chest pain and leg swelling.   Gastrointestinal: Positive for abdominal pain, diarrhea, nausea and vomiting.   Genitourinary: Positive for frequency. Negative for dysuria, vaginal bleeding and vaginal discharge.   Musculoskeletal: Negative for back pain and neck pain.   Skin: Negative for rash and wound.   Neurological: Negative for syncope, weakness and headaches.   Psychiatric/Behavioral: Negative for confusion.       Physical Exam     Initial Vitals [02/27/20 1034]   BP Pulse Resp Temp SpO2   131/87 76 20 98.6 °F (37 °C) 100 %      MAP       --         Physical Exam    Nursing note and vitals reviewed.  Constitutional: She appears well-developed and well-nourished. She is not diaphoretic. She is cooperative.  Non-toxic appearance. No distress.   HENT:   Head: Normocephalic and atraumatic.   Right Ear: External ear normal.   Left Ear: External ear normal.   Mouth/Throat: No trismus in the jaw.   Eyes: Conjunctivae and EOM are normal.   Neck: Normal range of motion. No  tracheal deviation present.   Cardiovascular: Normal rate, regular rhythm and normal heart sounds.   Pulmonary/Chest: Effort normal and breath sounds normal. No stridor. No tachypnea and no bradypnea. No respiratory distress. She has no wheezes. She has no rhonchi. She has no rales. She exhibits no tenderness.   Abdominal: Soft. She exhibits no distension and no mass. There is generalized tenderness. There is no guarding.   Musculoskeletal: Normal range of motion.   Neurological: She is alert and oriented to person, place, and time. She has normal strength. No sensory deficit. Coordination normal. GCS score is 15. GCS eye subscore is 4. GCS verbal subscore is 5. GCS motor subscore is 6.   Skin: Skin is warm, dry and intact. No bruising and no rash noted. No cyanosis or erythema. Nails show no clubbing.   Psychiatric: She has a normal mood and affect. Her behavior is normal. Thought content normal.         ED Course   Procedures  Labs Reviewed   POCT URINALYSIS W/O SCOPE - Abnormal; Notable for the following components:       Result Value    Bilirubin, UA 1+ (*)     Ketones, UA 2+ (*)     Spec Grav UA >=1.030 (*)     Blood, UA 2+ (*)     Protein, UA 2+ (*)     All other components within normal limits   POCT CMP - Abnormal; Notable for the following components:    AST (SGOT), POC 48 (*)     POC Potassium 3.2 (*)     Protein 8.2 (*)     All other components within normal limits   POCT LIVER PANEL - Abnormal; Notable for the following components:    ALT (SGPT), POC 48 (*)     AST (SGOT), POC 52 (*)     Protein 8.2 (*)     All other components within normal limits   POCT URINE PREGNANCY   POCT URINALYSIS W/O SCOPE   POCT CBC   POCT CMP   POCT LIVER PANEL   CBC:        Imaging Results          CT Abdomen Pelvis With Contrast (Final result)  Result time 02/27/20 11:43:00    Final result by Tone Melissa MD (02/27/20 11:43:00)                 Impression:      1. Hepatomegaly stable.  2. Postop  cholecystectomy.  3. Evidence of bilateral ovarian cysts, largest on right, stable.  Absence of free fluid within pelvis.  4. No new abnormality relative to history of nausea vomiting, diarrhea, bilateral lower quadrant pain.      Electronically signed by: Tone Melissa MD  Date:    02/27/2020  Time:    11:43             Narrative:    EXAMINATION:  CT ABDOMEN PELVIS WITH CONTRAST    CLINICAL HISTORY:  LLQ pain, suspect diverticulitis;Nausea, vomiting, diarrhea;RLQ pain, appendicitis suspected;    TECHNIQUE:  Low dose axial images, sagittal and coronal reformations were obtained from the lung bases to the pubic symphysis following the IV administration of 75 mL of Omnipaque 350 no oral contrast.    COMPARISON:  09/01/2019    FINDINGS:  Lung bases clear.  No free air.  Bony structures normal.    Liver 20.5 cm was 20.5 cm, vertical height right hepatic lobe without additional unusual finding.    The spleen, adrenal glands, pancreas, normal.  Postop cholecystectomy.    Retroflexion uterine fundus, urinary bladder small size.  Right adnexal ovarian cyst 2.3 was 2.8 cm image 64 series 2 cm.  Few tiny cyst left adnexa under 1.4 cm question.  Absence of previous posterior pelvic limited free fluid and no new adenopathy.    Kidneys, ureters normal.    Appendix appears intact.  Limited air distal esophagus.                                       APC / Resident Notes:   This is an evaluation of a 24 y.o. female that presents to the Emergency Department for Abdominal Pain. Patient seen in previously for similar symptoms, symptoms have worsened.Physical Exam shows a non-toxic, afebrile, and well appearing female.  There is diffuse abdominal tenderness without peritoneal signs. Otherwise normal physical exam.  Vital Signs Are Reassuring. RESULTS:  UPT negative. CBC with leukocytosis at 12.5, normal H&H.  Normal platelet count.  CMP with a mildly elevated AST at 48, potassium 3.2, protein 8.2.  Normal amylase.  Urinalysis  without evidence of infection, 2+ protein, 2+ ketones.  CT abdomen pelvis with hepatomegaly, cholecystectomy, bilateral ovarian cysts that are stable from previous.  No acute intra-abdominal findings.  Patient was advised of the ovarian cysts.  Encouraged to follow up with primary care OBGYN for further evaluation and monitoring.    My overall impression is generalized abdominal pain with nausea vomiting diarrhea, hypokalemia.  Patient does smoke marijuana, symptoms could be related to a viral syndrome, related to recent alcohol use, or possibly cannabinoid hyperemesis. Given the exam and laboratory studies, I considered, but at this time, do not suspect an appendicitis, ischemic bowel, bowel perforation, bowel obstruction,  pancreatitis, UTI, pyelonephritis, kidney stone, diverticulitis, pregnancy, ectopic pregnancy, TOA.  Given the size of the right ovarian cyst and resolution of the patient's abdominal pain on palpation, low suspicion for ovarian torsion.    D/C Meds:  Phenergan p.r.n.. Additional D/C Information: Abdominal Pain Precautions, Phenergan instructions. The diagnosis, treatment plan, instructions for follow-up and reevaluation with her PCP as well as ED return precautions were discussed and understanding was verbalized. All questions or concerns have been addressed.  ELIDIA Lakhani, AVA-C              ED Course as of Feb 27 1241   Thu Feb 27, 2020   1152 Reassessment:  Patient reports her abdominal pain is improving.  Repeat abdominal exam shows a nontender abdomen.  No tenderness in the right or left lower quadrants.  She has tolerated fluids.  Reports nausea was resolved.    [AF]      ED Course User Index  [AF] AVA Glaser                Clinical Impression:       ICD-10-CM ICD-9-CM   1. Generalized abdominal pain R10.84 789.07   2. Hypokalemia E87.6 276.8   3. Nausea vomiting and diarrhea R11.2 787.91    R19.7 787.01   4. Cysts of both ovaries N83.201 620.2    N83.202           Disposition:   Disposition: Discharged  Condition: Stable     ED Disposition Condition    Discharge Stable        ED Prescriptions     Medication Sig Dispense Start Date End Date Auth. Provider    potassium chloride SA (K-DUR,KLOR-CON) 20 MEQ tablet Take 2 tablets (40 mEq total) by mouth once daily. for 2 days 4 tablet 2/27/2020 2/29/2020 AVA Glaser    promethazine (PHENERGAN) 25 MG tablet Take 1 tablet (25 mg total) by mouth every 6 (six) hours as needed for Nausea. 12 tablet 2/27/2020  AVA Glaser    dicyclomine (BENTYL) 20 mg tablet Take 1 tablet (20 mg total) by mouth 2 (two) times daily as needed (Abdominal Pain/Cramping). 10 tablet 2/27/2020 3/28/2020 AVA Glaser        Follow-up Information    None                                    AVA Glaser  02/27/20 1471

## 2020-02-29 ENCOUNTER — HOSPITAL ENCOUNTER (EMERGENCY)
Facility: HOSPITAL | Age: 25
Discharge: HOME | End: 2020-02-29
Attending: INTERNAL MEDICINE
Payer: MEDICAID

## 2020-02-29 VITALS
HEIGHT: 66 IN | SYSTOLIC BLOOD PRESSURE: 141 MMHG | HEART RATE: 61 BPM | WEIGHT: 130 LBS | DIASTOLIC BLOOD PRESSURE: 97 MMHG | OXYGEN SATURATION: 96 % | RESPIRATION RATE: 15 BRPM | BODY MASS INDEX: 20.89 KG/M2 | TEMPERATURE: 98 F

## 2020-02-29 DIAGNOSIS — R11.2 CANNABINOID HYPEREMESIS SYNDROME: Primary | ICD-10-CM

## 2020-02-29 DIAGNOSIS — F12.90 CANNABINOID HYPEREMESIS SYNDROME: Primary | ICD-10-CM

## 2020-02-29 LAB
B-HCG UR QL: NEGATIVE
BILIRUBIN, POC UA: ABNORMAL
BLOOD, POC UA: ABNORMAL
CLARITY, POC UA: CLEAR
COLOR, POC UA: ABNORMAL
CTP QC/QA: YES
GLUCOSE, POC UA: NEGATIVE
KETONES, POC UA: ABNORMAL
LEUKOCYTE EST, POC UA: NEGATIVE
NITRITE, POC UA: NEGATIVE
PH UR STRIP: 6.5 [PH]
PROTEIN, POC UA: ABNORMAL
SPECIFIC GRAVITY, POC UA: 1.02
UROBILINOGEN, POC UA: 2 E.U./DL

## 2020-02-29 PROCEDURE — 96372 THER/PROPH/DIAG INJ SC/IM: CPT | Mod: ER

## 2020-02-29 PROCEDURE — 99284 EMERGENCY DEPT VISIT MOD MDM: CPT | Mod: 25,ER

## 2020-02-29 PROCEDURE — 81025 URINE PREGNANCY TEST: CPT | Mod: ER | Performed by: INTERNAL MEDICINE

## 2020-02-29 PROCEDURE — 63600175 PHARM REV CODE 636 W HCPCS: Mod: ER | Performed by: INTERNAL MEDICINE

## 2020-02-29 PROCEDURE — 81003 URINALYSIS AUTO W/O SCOPE: CPT | Mod: ER

## 2020-02-29 RX ORDER — ONDANSETRON 2 MG/ML
8 INJECTION INTRAMUSCULAR; INTRAVENOUS
Status: COMPLETED | OUTPATIENT
Start: 2020-02-29 | End: 2020-02-29

## 2020-02-29 RX ORDER — KETOROLAC TROMETHAMINE 30 MG/ML
60 INJECTION, SOLUTION INTRAMUSCULAR; INTRAVENOUS
Status: COMPLETED | OUTPATIENT
Start: 2020-02-29 | End: 2020-02-29

## 2020-02-29 RX ADMIN — ONDANSETRON HYDROCHLORIDE 8 MG: 2 SOLUTION INTRAMUSCULAR; INTRAVENOUS at 09:02

## 2020-02-29 RX ADMIN — KETOROLAC TROMETHAMINE 60 MG: 30 INJECTION, SOLUTION INTRAMUSCULAR at 09:02

## 2020-02-29 NOTE — ED TRIAGE NOTES
"Pt seen here multiple times recently- states "her dr moved or something" and tried calling # of ER referral and "they said it would be 2 weeks" and" I haven't eaten or hardly drank anything for 5 days- the zofran and promethazine not working"   "

## 2020-02-29 NOTE — ED PROVIDER NOTES
"Encounter Date: 2/29/2020       History     Chief Complaint   Patient presents with    Abdominal Pain     "severe" lower abdominal x 5 days; decreased urine and bowel movements; loss of appetite; n/v; denies fever     24-year-old female presents to the emergency department complaining persistent, intermittent generalized abdominal pain for the past several days.  She was seen 4 days ago in this emergency department for similar symptoms and received an abdominal pain workup including CT scan with contrast, CBC, CMP and was diagnosed with abdominal pain/ovarian cysts.  She states she has had intermittent nausea and vomiting as well as abdominal pain. She admits to smoking marijuana regularly.    The history is provided by the patient. No  was used.     Review of patient's allergies indicates:  No Known Allergies  Past Medical History:   Diagnosis Date    Asthma     Blood clot associated with vein wall inflammation     History of broken nose      Past Surgical History:   Procedure Laterality Date    CHOLECYSTECTOMY  2010    RECONSTRUCTION OF NOSE Bilateral 5/29/2018    Procedure: RECONSTRUCTION-NASAL;  Surgeon: Yao Briggs MD;  Location: [facility] OR [address] [place] [address];  Service: ENT;  Laterality: Bilateral;  Septoplasty set, rhinoplasty set     Family History   Problem Relation Age of Onset    Hypertension Mother     No Known Problems Brother     No Known Problems Brother     No Known Problems Brother     No Known Problems Father     No Known Problems Sister     No Known Problems Maternal Aunt     No Known Problems Maternal Uncle     No Known Problems Paternal Aunt     No Known Problems Paternal Uncle     No Known Problems Maternal Grandmother     No Known Problems Maternal Grandfather     No Known Problems Paternal Grandmother     No Known Problems Paternal Grandfather     Breast cancer Neg Hx     Colon cancer Neg Hx     Ovarian cancer Neg Hx     Amblyopia Neg Hx     Blindness Neg Hx  "    Cancer Neg Hx     Cataracts Neg Hx     Diabetes Neg Hx     Glaucoma Neg Hx     Macular degeneration Neg Hx     Retinal detachment Neg Hx     Strabismus Neg Hx     Stroke Neg Hx     Thyroid disease Neg Hx      Social History     Tobacco Use    Smoking status: Never Smoker    Smokeless tobacco: Never Used   Substance Use Topics    Alcohol use: Yes     Comment: occasionally    Drug use: Yes     Types: Marijuana     Comment: Smokes marijuana occasionally     Review of Systems   Constitutional: Negative for chills and fever.   Gastrointestinal: Positive for abdominal pain, nausea and vomiting. Negative for abdominal distention and diarrhea.   All other systems reviewed and are negative.      Physical Exam     Initial Vitals [02/29/20 0822]   BP Pulse Resp Temp SpO2   (!) 124/93 73 18 98.4 °F (36.9 °C) 100 %      MAP       --         Physical Exam    Nursing note and vitals reviewed.  Constitutional: She appears well-developed and well-nourished.   HENT:   Head: Normocephalic.   Eyes: Conjunctivae and EOM are normal.   Neck: Neck supple.   Cardiovascular: Normal rate and regular rhythm.   Pulmonary/Chest: Breath sounds normal. No respiratory distress.   Abdominal: Soft. Bowel sounds are normal. She exhibits no distension. There is no tenderness.   Musculoskeletal: Normal range of motion.   Neurological: She is alert. She has normal strength.   Skin: Skin is warm and dry.   Psychiatric: She has a normal mood and affect.         ED Course   Procedures  Labs Reviewed   POCT URINALYSIS W/O SCOPE - Abnormal; Notable for the following components:       Result Value    Bilirubin, UA 2+ (*)     Ketones, UA 2+ (*)     Blood, UA Trace-lysed (*)     Protein, UA 2+ (*)     Urobilinogen, UA 2.0 (*)     All other components within normal limits   POCT URINE PREGNANCY   POCT URINALYSIS W/O SCOPE          Imaging Results    None          Medical Decision Making:   Initial Assessment:   24-year-old female presents to the  emergency department complaining persistent, intermittent generalized abdominal pain for the past several days.  She was seen 4 days ago in this emergency department for similar symptoms and received an abdominal pain workup including CT scan with contrast, CBC, CMP and was diagnosed with abdominal pain/ovarian cysts.  She states she has had intermittent nausea and vomiting as well as abdominal pain. She admits to smoking marijuana regularly.  ED Management:  Patient was given instructions for cannabis induced hyperemesis and received Toradol/Zofran in the emergency department.  She was advised to follow up with her primary care physician within the next 3 days for re-evaluation/return to the emergency department if condition worsens.                                 Clinical Impression:       ICD-10-CM ICD-9-CM   1. Cannabinoid hyperemesis syndrome F12.988 536.2     305.20         Disposition:   Disposition: Discharged  Condition: Stable                        Lauri Marinelli MD  02/29/20 0912

## 2020-10-27 ENCOUNTER — HOSPITAL ENCOUNTER (EMERGENCY)
Facility: HOSPITAL | Age: 25
Discharge: HOME | End: 2020-10-27
Attending: EMERGENCY MEDICINE
Payer: MEDICAID

## 2020-10-27 VITALS
BODY MASS INDEX: 20.73 KG/M2 | HEART RATE: 60 BPM | DIASTOLIC BLOOD PRESSURE: 70 MMHG | OXYGEN SATURATION: 100 % | WEIGHT: 129 LBS | TEMPERATURE: 99 F | SYSTOLIC BLOOD PRESSURE: 129 MMHG | RESPIRATION RATE: 18 BRPM | HEIGHT: 66 IN

## 2020-10-27 DIAGNOSIS — R11.10 HYPEREMESIS: Primary | ICD-10-CM

## 2020-10-27 PROBLEM — F12.188 CANNABIS HYPEREMESIS SYNDROME CONCURRENT WITH AND DUE TO CANNABIS ABUSE: Status: ACTIVE | Noted: 2020-10-27

## 2020-10-27 LAB
ALBUMIN SERPL-MCNC: 4.4 G/DL (ref 3.3–5.5)
ALBUMIN SERPL-MCNC: 4.5 G/DL (ref 3.3–5.5)
ALP SERPL-CCNC: 70 U/L (ref 42–141)
ALP SERPL-CCNC: 73 U/L (ref 42–141)
B-HCG UR QL: NEGATIVE
BILIRUB SERPL-MCNC: 1.1 MG/DL (ref 0.2–1.6)
BILIRUB SERPL-MCNC: 1.1 MG/DL (ref 0.2–1.6)
BILIRUBIN, POC UA: NEGATIVE
BLOOD, POC UA: ABNORMAL
BUN SERPL-MCNC: 13 MG/DL (ref 7–22)
CALCIUM SERPL-MCNC: 9.5 MG/DL (ref 8–10.3)
CHLORIDE SERPL-SCNC: 102 MMOL/L (ref 98–108)
CLARITY, POC UA: CLEAR
COLOR, POC UA: YELLOW
CREAT SERPL-MCNC: 0.8 MG/DL (ref 0.6–1.2)
CTP QC/QA: YES
GLUCOSE SERPL-MCNC: 104 MG/DL (ref 73–118)
GLUCOSE, POC UA: NEGATIVE
KETONES, POC UA: ABNORMAL
LEUKOCYTE EST, POC UA: NEGATIVE
NITRITE, POC UA: NEGATIVE
PH UR STRIP: 6.5 [PH]
POC ALT (SGPT): 41 U/L (ref 10–47)
POC ALT (SGPT): 42 U/L (ref 10–47)
POC AMYLASE: 73 U/L (ref 14–97)
POC AST (SGOT): 50 U/L (ref 11–38)
POC AST (SGOT): 56 U/L (ref 11–38)
POC GGT: 17 U/L (ref 5–65)
POC TCO2: 29 MMOL/L (ref 18–33)
POTASSIUM BLD-SCNC: 3.4 MMOL/L (ref 3.6–5.1)
PROTEIN, POC UA: ABNORMAL
PROTEIN, POC: 8.4 G/DL (ref 6.4–8.1)
PROTEIN, POC: 8.4 G/DL (ref 6.4–8.1)
SODIUM BLD-SCNC: 137 MMOL/L (ref 128–145)
SPECIFIC GRAVITY, POC UA: 1.02
UROBILINOGEN, POC UA: 1 E.U./DL

## 2020-10-27 PROCEDURE — 81003 URINALYSIS AUTO W/O SCOPE: CPT | Mod: ER

## 2020-10-27 PROCEDURE — 85025 COMPLETE CBC W/AUTO DIFF WBC: CPT | Mod: ER

## 2020-10-27 PROCEDURE — 81025 URINE PREGNANCY TEST: CPT | Mod: ER | Performed by: EMERGENCY MEDICINE

## 2020-10-27 PROCEDURE — 25000003 PHARM REV CODE 250: Mod: ER | Performed by: EMERGENCY MEDICINE

## 2020-10-27 PROCEDURE — 96361 HYDRATE IV INFUSION ADD-ON: CPT | Mod: ER

## 2020-10-27 PROCEDURE — 96374 THER/PROPH/DIAG INJ IV PUSH: CPT | Mod: ER

## 2020-10-27 PROCEDURE — 82150 ASSAY OF AMYLASE: CPT | Mod: ER

## 2020-10-27 PROCEDURE — 80053 COMPREHEN METABOLIC PANEL: CPT | Mod: ER

## 2020-10-27 PROCEDURE — 63600175 PHARM REV CODE 636 W HCPCS: Mod: ER | Performed by: EMERGENCY MEDICINE

## 2020-10-27 PROCEDURE — 99284 EMERGENCY DEPT VISIT MOD MDM: CPT | Mod: 25,ER

## 2020-10-27 RX ORDER — HALOPERIDOL 5 MG/1
5 TABLET ORAL 3 TIMES DAILY PRN
Qty: 30 TABLET | Refills: 0 | Status: SHIPPED | OUTPATIENT
Start: 2020-10-27 | End: 2021-10-27

## 2020-10-27 RX ORDER — HALOPERIDOL 5 MG/ML
5 INJECTION INTRAMUSCULAR
Status: COMPLETED | OUTPATIENT
Start: 2020-10-27 | End: 2020-10-27

## 2020-10-27 RX ADMIN — SODIUM CHLORIDE 1000 ML: 0.9 INJECTION, SOLUTION INTRAVENOUS at 09:10

## 2020-10-27 RX ADMIN — HALOPERIDOL LACTATE 5 MG: 5 INJECTION, SOLUTION INTRAMUSCULAR at 09:10

## 2020-10-27 NOTE — DISCHARGE INSTRUCTIONS
Thank you for coming to our Emergency Department today. It is important to remember that some problems are difficult to diagnose and may not be found during your first visit. Be sure to follow up with your primary care doctor and review any labs/imaging that was performed with them. If you do not have a primary care doctor, you may contact the one listed on your discharge paperwork or you may also call the [facility] Appointment Desk at [phone] to schedule an appointment with one.     All medications may potentially have side effects and it is impossible to predict which medications may give you side effects. If you feel that you are having a negative effect of any medication you should immediately stop taking them and seek medical attention.    Return to the ER with any questions/concerns, new/concerning symptoms, worsening or failure to improve. Do not drive or make any important decisions for 24 hours if you have received any pain medications, sedatives or mood altering drugs during your ER visit.

## 2020-10-27 NOTE — ED NOTES
Patient arrived with report of abdominal discomfort from N/V with onset Sunday.  Last emesis occurred at approx 0700 this am and patient reports able to retain liquids.  Denies nausea at this time.  Physician at bedside for assessment.

## 2020-10-27 NOTE — ED PROVIDER NOTES
Encounter Date: 10/27/2020       History     Chief Complaint   Patient presents with    Vomiting     Pt to ER with c/o N/V since Sunday. PT reports not able to keep anything down.      25 y.o. female Past Medical History:  No date: Asthma  No date: Blood clot associated with vein wall inflammation  No date: History of broken nose     Suspected cannabis hyperemesis syndrome    Presents for evaluation on intractable vomiting since Sunday. Pt notes that she smoked marijuana on Saturday but adamantly denies that it is playing any role in her disease process. She denies headache, nasal congestion, sore throat, diarrhea/dysuria, body aches, f/c, or other complaints.        Review of patient's allergies indicates:  No Known Allergies  Past Medical History:   Diagnosis Date    Asthma     Blood clot associated with vein wall inflammation     History of broken nose      Past Surgical History:   Procedure Laterality Date    CHOLECYSTECTOMY  2010    RECONSTRUCTION OF NOSE Bilateral 5/29/2018    Procedure: RECONSTRUCTION-NASAL;  Surgeon: Yao Briggs MD;  Location: [facility] OR [address] [place] [address];  Service: ENT;  Laterality: Bilateral;  Septoplasty set, rhinoplasty set     Family History   Problem Relation Age of Onset    Hypertension Mother     No Known Problems Brother     No Known Problems Brother     No Known Problems Brother     No Known Problems Father     No Known Problems Sister     No Known Problems Maternal Aunt     No Known Problems Maternal Uncle     No Known Problems Paternal Aunt     No Known Problems Paternal Uncle     No Known Problems Maternal Grandmother     No Known Problems Maternal Grandfather     No Known Problems Paternal Grandmother     No Known Problems Paternal Grandfather     Breast cancer Neg Hx     Colon cancer Neg Hx     Ovarian cancer Neg Hx     Amblyopia Neg Hx     Blindness Neg Hx     Cancer Neg Hx     Cataracts Neg Hx     Diabetes Neg Hx     Glaucoma Neg Hx     Macular  degeneration Neg Hx     Retinal detachment Neg Hx     Strabismus Neg Hx     Stroke Neg Hx     Thyroid disease Neg Hx      Social History     Tobacco Use    Smoking status: Never Smoker    Smokeless tobacco: Never Used   Substance Use Topics    Alcohol use: Yes     Comment: occasionally    Drug use: Yes     Types: Marijuana     Comment: Smokes marijuana occasionally     Review of Systems   Constitutional: Negative for fever.   HENT: Negative for sore throat.    Respiratory: Negative for shortness of breath.    Cardiovascular: Negative for chest pain.   Gastrointestinal: Positive for nausea and vomiting.   Genitourinary: Negative for dysuria.   Musculoskeletal: Negative for back pain.   Skin: Negative for rash.   Neurological: Negative for weakness.   Hematological: Does not bruise/bleed easily.   All other systems reviewed and are negative.      Physical Exam     Initial Vitals [10/27/20 0918]   BP Pulse Resp Temp SpO2   (!) 137/92 (!) 57 18 98.3 °F (36.8 °C) 100 %      MAP       --         Physical Exam    Nursing note and vitals reviewed.  Constitutional: She appears well-developed and well-nourished.   HENT:   Head: Normocephalic and atraumatic.   Eyes: Conjunctivae and EOM are normal. Pupils are equal, round, and reactive to light.   Neck: Normal range of motion.   Cardiovascular: Normal rate and regular rhythm.   Pulmonary/Chest: Breath sounds normal. No respiratory distress.   Abdominal: Soft. She exhibits no distension. There is no abdominal tenderness. There is no rebound and no guarding.   Musculoskeletal: Normal range of motion.   Neurological: She is alert. No cranial nerve deficit. GCS score is 15. GCS eye subscore is 4. GCS verbal subscore is 5. GCS motor subscore is 6.   Skin: Skin is warm and dry.   Psychiatric: She has a normal mood and affect. Thought content normal.     ntnd no g/r    ED Course   Procedures  Labs Reviewed   POCT URINALYSIS W/O SCOPE - Abnormal; Notable for the following  components:       Result Value    Ketones, UA 1+ (*)     Blood, UA 3+ (*)     Protein, UA 2+ (*)     All other components within normal limits   POCT LIVER PANEL - Abnormal; Notable for the following components:    AST (SGOT), POC 56 (*)     Protein 8.4 (*)     All other components within normal limits   POCT CMP - Abnormal; Notable for the following components:    AST (SGOT), POC 50 (*)     POC Potassium 3.4 (*)     Protein 8.4 (*)     All other components within normal limits   POCT URINE PREGNANCY   POCT CBC   POCT URINALYSIS W/O SCOPE   POCT CMP   POCT LIVER PANEL          Imaging Results    None                       I have reviewed the patient's records, her CTs/prior workups normal   pt becomes very angry when asked if she is familiar with cannabis hyperemesis syndrome.      Symptoms have resolved with interventions.   Have advised pt to stop smoking marijuana. Will discharge on haldol prn for vomiting.    Clinical Impression:     ICD-10-CM ICD-9-CM   1. Hyperemesis  R11.10 536.2                          ED Disposition Condition    Discharge Stable        ED Prescriptions     Medication Sig Dispense Start Date End Date Auth. Provider    haloperidoL (HALDOL) 5 MG tablet Take 1 tablet (5 mg total) by mouth 3 (three) times daily as needed (vomiting). 30 tablet 10/27/2020 10/27/2021 Pebbles Hunter MD        Follow-up Information     Follow up With Specialties Details Why Contact Info    [facility]    [address] [place] [address]  [phone]                                         Pebbles Hunter MD  10/27/20 4645

## 2022-01-04 ENCOUNTER — HOSPITAL ENCOUNTER (EMERGENCY)
Facility: HOSPITAL | Age: 27
Discharge: HOME | End: 2022-01-04
Attending: EMERGENCY MEDICINE
Payer: MEDICAID

## 2022-01-04 VITALS
OXYGEN SATURATION: 100 % | WEIGHT: 130 LBS | DIASTOLIC BLOOD PRESSURE: 84 MMHG | TEMPERATURE: 98 F | RESPIRATION RATE: 18 BRPM | BODY MASS INDEX: 20.89 KG/M2 | HEIGHT: 66 IN | HEART RATE: 82 BPM | SYSTOLIC BLOOD PRESSURE: 144 MMHG

## 2022-01-04 DIAGNOSIS — Z20.822 EXPOSURE TO COVID-19 VIRUS: Primary | ICD-10-CM

## 2022-01-04 DIAGNOSIS — Z20.822 SUSPECTED COVID-19 VIRUS INFECTION: ICD-10-CM

## 2022-01-04 LAB
B-HCG UR QL: NEGATIVE
CTP QC/QA: YES

## 2022-01-04 PROCEDURE — 99284 EMERGENCY DEPT VISIT MOD MDM: CPT | Mod: 25,ER

## 2022-01-04 PROCEDURE — U0003 INFECTIOUS AGENT DETECTION BY NUCLEIC ACID (DNA OR RNA); SEVERE ACUTE RESPIRATORY SYNDROME CORONAVIRUS 2 (SARS-COV-2) (CORONAVIRUS DISEASE [COVID-19]), AMPLIFIED PROBE TECHNIQUE, MAKING USE OF HIGH THROUGHPUT TECHNOLOGIES AS DESCRIBED BY CMS-2020-01-R: HCPCS | Performed by: EMERGENCY MEDICINE

## 2022-01-04 PROCEDURE — U0005 INFEC AGEN DETEC AMPLI PROBE: HCPCS | Performed by: EMERGENCY MEDICINE

## 2022-01-04 PROCEDURE — 81025 URINE PREGNANCY TEST: CPT | Mod: ER | Performed by: EMERGENCY MEDICINE

## 2022-01-04 RX ORDER — LORATADINE 10 MG/1
10 TABLET ORAL DAILY
Qty: 60 TABLET | Refills: 0 | Status: SHIPPED | OUTPATIENT
Start: 2022-01-04 | End: 2023-01-04

## 2022-01-04 RX ORDER — FLUTICASONE PROPIONATE 50 MCG
1 SPRAY, SUSPENSION (ML) NASAL 2 TIMES DAILY
Qty: 16 G | Refills: 0 | Status: SHIPPED | OUTPATIENT
Start: 2022-01-04

## 2022-01-04 RX ORDER — PROMETHAZINE HYDROCHLORIDE AND DEXTROMETHORPHAN HYDROBROMIDE 6.25; 15 MG/5ML; MG/5ML
5 SYRUP ORAL 3 TIMES DAILY PRN
Qty: 200 ML | Refills: 0 | Status: SHIPPED | OUTPATIENT
Start: 2022-01-04 | End: 2022-01-14

## 2022-01-04 RX ORDER — ACETAMINOPHEN 500 MG
1000 TABLET ORAL EVERY 6 HOURS PRN
Qty: 30 TABLET | Refills: 0 | Status: SHIPPED | OUTPATIENT
Start: 2022-01-04

## 2022-01-04 NOTE — ED PROVIDER NOTES
Encounter Date: 1/4/2022    SCRIBE #1 NOTE: I, Juan Jose Richardson, am scribing for, and in the presence of, Briana Petersen DO.       History     Chief Complaint   Patient presents with    COVID-19 Concerns     Pt states she has had a headache and sore throat x3 days     26 year old female with Asthma who presents to the ED with complaints of a sore throat, headache, and cough for three days. Endorses a known COVID-19 contact five days ago. On exam, denies any rhinorrhea, fever, or chills. She was vaccinated for COVID-19 in September, 2021. Denies Hx of COVID-19 infection. No other complaints at this time.      The history is provided by the patient. No  was used.     Review of patient's allergies indicates:  No Known Allergies  Past Medical History:   Diagnosis Date    Asthma     Blood clot associated with vein wall inflammation     History of broken nose      Past Surgical History:   Procedure Laterality Date    CHOLECYSTECTOMY  2010    RECONSTRUCTION OF NOSE Bilateral 5/29/2018    Procedure: RECONSTRUCTION-NASAL;  Surgeon: Yao Briggs MD;  Location: [address];  Service: ENT;  Laterality: Bilateral;  Septoplasty set, rhinoplasty set     Family History   Problem Relation Age of Onset    Hypertension Mother     No Known Problems Brother     No Known Problems Brother     No Known Problems Brother     No Known Problems Father     No Known Problems Sister     No Known Problems Maternal Aunt     No Known Problems Maternal Uncle     No Known Problems Paternal Aunt     No Known Problems Paternal Uncle     No Known Problems Maternal Grandmother     No Known Problems Maternal Grandfather     No Known Problems Paternal Grandmother     No Known Problems Paternal Grandfather     Breast cancer Neg Hx     Colon cancer Neg Hx     Ovarian cancer Neg Hx     Amblyopia Neg Hx     Blindness Neg Hx     Cancer Neg Hx     Cataracts Neg Hx     Diabetes Neg Hx     Glaucoma Neg Hx      Macular degeneration Neg Hx     Retinal detachment Neg Hx     Strabismus Neg Hx     Stroke Neg Hx     Thyroid disease Neg Hx      Social History     Tobacco Use    Smoking status: Never Smoker    Smokeless tobacco: Never Used   Substance Use Topics    Alcohol use: Yes     Comment: occasionally    Drug use: Yes     Frequency: 2.0 times per week     Types: Marijuana     Comment: Smokes marijuana occasionally     Review of Systems   Constitutional: Negative for chills and fever.   HENT: Positive for sore throat. Negative for rhinorrhea.    Respiratory: Positive for cough.    Neurological: Positive for headaches.   All other systems reviewed and are negative.      Physical Exam     Patient gave consent to have physical exam performed.    Initial Vitals [01/04/22 0804]   BP Pulse Resp Temp SpO2   (!) 144/84 82 18 98.3 °F (36.8 °C) 100 %      MAP       --         Physical Exam    Nursing note and vitals reviewed.  Constitutional: She appears well-developed and well-nourished.   HENT:   Head: Atraumatic.   Right Ear: External ear normal.   Left Ear: External ear normal.   Nose: Mucosal edema and rhinorrhea present.   Eyes: Conjunctivae and EOM are normal. Pupils are equal, round, and reactive to light. Right eye exhibits no discharge. Left eye exhibits no discharge.   Neck: Neck supple.   Normal range of motion.  Cardiovascular: Normal rate, regular rhythm, normal heart sounds and intact distal pulses. Exam reveals no gallop and no friction rub.    No murmur heard.  Pulmonary/Chest: Breath sounds normal. No respiratory distress. She has no wheezes. She has no rhonchi. She has no rales. She exhibits no tenderness.   Abdominal: Abdomen is soft. Bowel sounds are normal. She exhibits no distension and no mass. There is no abdominal tenderness.   No CVA tenderness There is no rebound and no guarding.   Musculoskeletal:         General: No tenderness or edema. Normal range of motion.      Cervical back: Normal range of  motion and neck supple.     Neurological: She is alert and oriented to person, place, and time.   Skin: Skin is warm and dry.   Psychiatric: She has a normal mood and affect. Her behavior is normal.         ED Course   Procedures  Labs Reviewed   SARS-COV-2 (COVID-19) QUALITATIVE PCR   POCT URINE PREGNANCY          Imaging Results    None          Medications - No data to display      Chief complaint: sore throat, cough, headache  Differential diagnosis:  Pregnancy, viral illness, COVID exposure, COVID infection, otitis media, and otitis externa.    Treatment in the ED: PE, labs  Patient reports feeling better after treatment in the ER.      Discussed treatment, prescriptions, and lab results.     Discharge home with Flonase, Claritin, Promethazine-DM, Tylenol.   Fill and take prescriptions as directed.  Return to the ED if symptoms worsen or do not resolve.   Answered questions and discussed discharge plan.    Patient feels better and is ready for discharge.  Follow up with PCP/specialist in 1 day.          Scribe Attestation:   Scribe #1: I performed the above scribed service and the documentation accurately describes the services I performed. I attest to the accuracy of the note.                  I, Dr. Briana Petersen, personally performed the services described in this documentation. This document was produced by a scribe under my direction and in my presence. All medical record entries made by the scribe were at my direction and in my presence.  I have reviewed the chart and agree that the record reflects my personal performance and is accurate and complete. Briana Petersen, DO.     01/04/2022 11:47 AM      Clinical Impression:   Final diagnoses:  [Z20.822] Exposure to COVID-19 virus (Primary)  [Z20.822] Suspected COVID-19 virus infection          ED Disposition Condition    Discharge Stable        ED Prescriptions     Medication Sig Dispense Start Date End Date Auth. Provider    fluticasone propionate  (FLONASE) 50 mcg/actuation nasal spray 1 spray (50 mcg total) by Each Nostril route 2 (two) times daily. 16 g 1/4/2022  Briana Petersen DO    loratadine (CLARITIN) 10 mg tablet Take 1 tablet (10 mg total) by mouth once daily. 60 tablet 1/4/2022 1/4/2023 Briana Petersen DO    promethazine-dextromethorphan (PROMETHAZINE-DM) 6.25-15 mg/5 mL Syrp Take 5 mLs by mouth 3 (three) times daily as needed (cougn and congestion). 200 mL 1/4/2022 1/14/2022 Briana Petersen DO    acetaminophen (TYLENOL) 500 MG tablet Take 2 tablets (1,000 mg total) by mouth every 6 (six) hours as needed for Pain (and fever). 30 tablet 1/4/2022  Briana Petersen DO        Follow-up Information     Follow up With Specialties Details Why Contact Info Additional Information    [facility] Sarah Beth Tavarez  Schedule an appointment as soon as possible for a visit in 1 day Please establish a primary care physician [address] [place] [address]  [phone]       [facility] Family Medicine Schedule an appointment as soon as possible for a visit in 1 day Please establish a primary care physician [address], [address]  [facility] [address] [phone] Please park in [address] or D and use Jannette bell. Take Medical Office [facility]. elevators.    [facility] [place] - Emergency Dept Emergency Medicine Go to  Please go to [facility] emergency department if symptoms worsen [address]  [facility] [id number] [phone]            Briana Petersen DO  01/04/22 1147

## 2022-01-04 NOTE — Clinical Note
"Mackenzie "Samantha Smart was seen and treated in our emergency department on 1/4/2022.     COVID-19 is present in our communities across the state. There is limited testing for COVID at this time, so not all patients can be tested. In this situation, your employee meets the following criteria:    Mackenzie Smart has met the criteria for COVID-19 testing based upon symptoms, travel, and/or potential exposure. The test has been completed and is pending results at this time. During this time the employee is not able to work and should be quarantined per the Centers for Disease Control timelines.     If you have any questions or concerns, or if I can be of further assistance, please do not hesitate to contact me.    Sincerely,             Briana Petersen, DO"

## 2022-01-05 ENCOUNTER — PATIENT MESSAGE (OUTPATIENT)
Dept: ADMINISTRATIVE | Facility: OTHER | Age: 27
End: 2022-01-05
Payer: MEDICAID

## 2022-01-05 LAB
SARS-COV-2 RNA RESP QL NAA+PROBE: DETECTED
SARS-COV-2- CYCLE NUMBER: 25

## 2023-10-18 ENCOUNTER — HOSPITAL ENCOUNTER (EMERGENCY)
Facility: HOSPITAL | Age: 28
Discharge: HOME | End: 2023-10-18
Attending: EMERGENCY MEDICINE
Payer: MEDICAID

## 2023-10-18 VITALS
WEIGHT: 140 LBS | TEMPERATURE: 99 F | HEART RATE: 91 BPM | RESPIRATION RATE: 16 BRPM | HEIGHT: 66 IN | OXYGEN SATURATION: 100 % | SYSTOLIC BLOOD PRESSURE: 111 MMHG | DIASTOLIC BLOOD PRESSURE: 74 MMHG | BODY MASS INDEX: 22.5 KG/M2

## 2023-10-18 DIAGNOSIS — O03.9 MISCARRIAGE: Primary | ICD-10-CM

## 2023-10-18 LAB
ALBUMIN SERPL-MCNC: 4.1 G/DL (ref 3.3–5.5)
ALP SERPL-CCNC: 59 U/L (ref 42–141)
B-HCG UR QL: POSITIVE
BASOPHILS # BLD AUTO: 0.03 K/UL (ref 0–0.2)
BASOPHILS NFR BLD: 0.2 % (ref 0–1.9)
BILIRUB SERPL-MCNC: 0.9 MG/DL (ref 0.2–1.6)
BILIRUBIN, POC UA: ABNORMAL
BLOOD, POC UA: NEGATIVE
BUN SERPL-MCNC: 13 MG/DL (ref 7–22)
CALCIUM SERPL-MCNC: 9.2 MG/DL (ref 8–10.3)
CHLORIDE SERPL-SCNC: 109 MMOL/L (ref 98–108)
CLARITY, POC UA: CLEAR
COLOR, POC UA: ABNORMAL
CREAT SERPL-MCNC: 0.4 MG/DL (ref 0.6–1.2)
CTP QC/QA: YES
DIFFERENTIAL METHOD: ABNORMAL
EOSINOPHIL # BLD AUTO: 0.1 K/UL (ref 0–0.5)
EOSINOPHIL NFR BLD: 0.7 % (ref 0–8)
ERYTHROCYTE [DISTWIDTH] IN BLOOD BY AUTOMATED COUNT: 13.5 % (ref 11.5–14.5)
GLUCOSE SERPL-MCNC: 96 MG/DL (ref 73–118)
GLUCOSE, POC UA: NEGATIVE
HCT VFR BLD AUTO: 35.6 % (ref 37–48.5)
HGB BLD-MCNC: 12.2 G/DL (ref 12–16)
IMM GRANULOCYTES # BLD AUTO: 0.05 K/UL (ref 0–0.04)
IMM GRANULOCYTES NFR BLD AUTO: 0.4 % (ref 0–0.5)
KETONES, POC UA: ABNORMAL
LEUKOCYTE EST, POC UA: NEGATIVE
LYMPHOCYTES # BLD AUTO: 0.6 K/UL (ref 1–4.8)
LYMPHOCYTES NFR BLD: 4.9 % (ref 18–48)
MCH RBC QN AUTO: 30.4 PG (ref 27–31)
MCHC RBC AUTO-ENTMCNC: 34.3 G/DL (ref 32–36)
MCV RBC AUTO: 89 FL (ref 82–98)
MONOCYTES # BLD AUTO: 0.4 K/UL (ref 0.3–1)
MONOCYTES NFR BLD: 2.9 % (ref 4–15)
NEUTROPHILS # BLD AUTO: 11.1 K/UL (ref 1.8–7.7)
NEUTROPHILS NFR BLD: 90.9 % (ref 38–73)
NITRITE, POC UA: NEGATIVE
NRBC BLD-RTO: 0 /100 WBC
PH UR STRIP: 6 [PH]
PLATELET # BLD AUTO: 302 K/UL (ref 150–450)
PMV BLD AUTO: 10 FL (ref 9.2–12.9)
POC ALT (SGPT): 21 U/L (ref 10–47)
POC AST (SGOT): 33 U/L (ref 11–38)
POC BETA-HCG (QUANT): >2000 IU/L
POC TCO2: 28 MMOL/L (ref 18–33)
POTASSIUM BLD-SCNC: 4.7 MMOL/L (ref 3.6–5.1)
PROTEIN, POC UA: ABNORMAL
PROTEIN, POC: 7.5 G/DL (ref 6.4–8.1)
RBC # BLD AUTO: 4.01 M/UL (ref 4–5.4)
SAMPLE: NORMAL
SODIUM BLD-SCNC: 125 MMOL/L (ref 128–145)
SPECIFIC GRAVITY, POC UA: >=1.03
UROBILINOGEN, POC UA: 0.2 E.U./DL
WBC # BLD AUTO: 12.18 K/UL (ref 3.9–12.7)

## 2023-10-18 PROCEDURE — 81025 URINE PREGNANCY TEST: CPT | Mod: ER | Performed by: NURSE PRACTITIONER

## 2023-10-18 PROCEDURE — 84702 CHORIONIC GONADOTROPIN TEST: CPT | Mod: ER

## 2023-10-18 PROCEDURE — 99285 EMERGENCY DEPT VISIT HI MDM: CPT | Mod: 25,ER

## 2023-10-18 PROCEDURE — 25000003 PHARM REV CODE 250: Mod: ER | Performed by: EMERGENCY MEDICINE

## 2023-10-18 PROCEDURE — 96361 HYDRATE IV INFUSION ADD-ON: CPT | Mod: ER

## 2023-10-18 PROCEDURE — 85025 COMPLETE CBC W/AUTO DIFF WBC: CPT | Mod: ER

## 2023-10-18 PROCEDURE — 85025 COMPLETE CBC W/AUTO DIFF WBC: CPT | Performed by: EMERGENCY MEDICINE

## 2023-10-18 PROCEDURE — 80053 COMPREHEN METABOLIC PANEL: CPT | Mod: ER

## 2023-10-18 PROCEDURE — 96360 HYDRATION IV INFUSION INIT: CPT | Mod: ER

## 2023-10-18 PROCEDURE — 81003 URINALYSIS AUTO W/O SCOPE: CPT | Mod: ER

## 2023-10-18 PROCEDURE — 87086 URINE CULTURE/COLONY COUNT: CPT | Performed by: EMERGENCY MEDICINE

## 2023-10-18 RX ADMIN — SODIUM CHLORIDE 1000 ML: 9 INJECTION, SOLUTION INTRAVENOUS at 10:10

## 2023-10-18 NOTE — ED PROVIDER NOTES
Encounter Date: 10/18/2023    SCRIBE #1 NOTE: I, Anupama Baez, am scribing for, and in the presence of,  Rosalio Han MD. I have scribed the following portions of the note - Other sections scribed: HPI, ROS.       History     Chief Complaint   Patient presents with    Vomiting     Complains of nausea and vomiting since midnight. Currently 8 wks pregnant.    Vaginal Bleeding     Complains of vaginal bleeding since last night. 8 weeks pregnant. Denies abdominal pain or back pain.     Mackenzie Smart is a 28 y.o. female who presents to the ED with many episodes of vomiting and vaginal bleeding last night. Patient reports she found out she was pregnant 3 weeks ago per blood test and US, LMP 2023.  Reports increased urinary frequency. States she is not nauseous right now, but has been nauseous for weeks associated w/ pregnancy. Reports vaginal bleeding with blood clots, does not suspect hematochezia or hematuria. Patient reports she cannot keep any food down and has not eaten this AM PTA. Patient states she drank a coca-cola yesterday for the first time in months. No attempted Tx. Upcoming OBGYN appointment on 10/25/2023. A0.  Reports extreme nausea and vomiting with second pregnancy. No other exacerbating or alleviating factors. Patient admits EtOH use but not since pregnancy, but denies cigarette or recreational drug use. Denies fever, chills, abdominal pain, dysuria, CP, SOB or other associated symptoms.      The history is provided by the patient. No  was used.     Review of patient's allergies indicates:  No Known Allergies  Past Medical History:   Diagnosis Date    Asthma     Blood clot associated with vein wall inflammation     History of broken nose      Past Surgical History:   Procedure Laterality Date    CHOLECYSTECTOMY      RECONSTRUCTION OF NOSE Bilateral 2018    Procedure: RECONSTRUCTION-NASAL;  Surgeon: Yao Briggs MD;  Location: [facility] OR [address] [place] [address];  Service: ENT;   Laterality: Bilateral;  Septoplasty set, rhinoplasty set     Family History   Problem Relation Age of Onset    Hypertension Mother     No Known Problems Brother     No Known Problems Brother     No Known Problems Brother     No Known Problems Father     No Known Problems Sister     No Known Problems Maternal Aunt     No Known Problems Maternal Uncle     No Known Problems Paternal Aunt     No Known Problems Paternal Uncle     No Known Problems Maternal Grandmother     No Known Problems Maternal Grandfather     No Known Problems Paternal Grandmother     No Known Problems Paternal Grandfather     Breast cancer Neg Hx     Colon cancer Neg Hx     Ovarian cancer Neg Hx     Amblyopia Neg Hx     Blindness Neg Hx     Cancer Neg Hx     Cataracts Neg Hx     Diabetes Neg Hx     Glaucoma Neg Hx     Macular degeneration Neg Hx     Retinal detachment Neg Hx     Strabismus Neg Hx     Stroke Neg Hx     Thyroid disease Neg Hx      Social History     Tobacco Use    Smoking status: Never    Smokeless tobacco: Never   Substance Use Topics    Alcohol use: Yes     Comment: occasionally    Drug use: Yes     Frequency: 2.0 times per week     Types: Marijuana     Comment: Smokes marijuana occasionally     Review of Systems   Constitutional:  Negative for fever.   HENT:  Negative for sore throat.    Eyes:  Negative for visual disturbance.   Respiratory:  Negative for shortness of breath.    Cardiovascular:  Negative for chest pain.   Gastrointestinal:  Positive for nausea and vomiting. Negative for abdominal pain and blood in stool.   Genitourinary:  Positive for frequency and vaginal bleeding. Negative for dysuria and hematuria.   Musculoskeletal:  Negative for back pain.   Skin:  Negative for rash.   Neurological:  Negative for headaches.       Physical Exam     Initial Vitals [10/18/23 1003]   BP Pulse Resp Temp SpO2   128/76 106 16 98.6 °F (37 °C) 100 %      MAP       --         Physical Exam    Nursing note and vitals  reviewed.  Constitutional: She appears well-developed and well-nourished.   HENT:   Head: Normocephalic and atraumatic.   Eyes: EOM are normal. Pupils are equal, round, and reactive to light.   Neck: Neck supple. No thyromegaly present. No JVD present.   Normal range of motion.  Cardiovascular:  Normal rate and regular rhythm.     Exam reveals no gallop and no friction rub.       No murmur heard.  Pulmonary/Chest: Breath sounds normal. No respiratory distress.   Abdominal: Abdomen is soft. Bowel sounds are normal. There is no abdominal tenderness.   No right CVA tenderness.  No left CVA tenderness.   Musculoskeletal:         General: No tenderness or edema. Normal range of motion.      Cervical back: Normal range of motion and neck supple.     Neurological: She is alert and oriented to person, place, and time. She has normal strength. GCS score is 15. GCS eye subscore is 4. GCS verbal subscore is 5. GCS motor subscore is 6.   Skin: Skin is warm and dry. Capillary refill takes less than 2 seconds.         ED Course   Procedures  Labs Reviewed   POCT URINE PREGNANCY - Abnormal; Notable for the following components:       Result Value    POC Preg Test, Ur Positive (*)     All other components within normal limits   POCT URINALYSIS W/O SCOPE - Abnormal; Notable for the following components:    Bilirubin, UA 1+ (*)     Ketones, UA 1+ (*)     Spec Grav UA >=1.030 (*)     Protein, UA 2+ (*)     All other components within normal limits   POCT CMP - Abnormal; Notable for the following components:    POC Chloride 109 (*)     POC Creatinine 0.4 (*)     POC Sodium 125 (*)     All other components within normal limits   CULTURE, URINE   CBC W/ AUTO DIFFERENTIAL   POCT URINALYSIS W/O SCOPE   POCT CBC   POCT CMP   POCT B-HCG (QUANT)   POCT B-HCG (QUANT)          Imaging Results              US OB <14 Wks, TransAbd, Single Gestation (Final result)  Result time 10/18/23 11:53:04   Procedure changed from US OB <14 Wks TransAbd &  TransVag, Single Gestation (XPD)     Final result by Mariano Clayton MD (10/18/23 11:53:04)                   Impression:      No intrauterine gestational sac is visualized.  If patient had previously identified intrauterine pregnancy on prior scan, findings would represent recent .  Clinical correlation and follow-up with serial beta HCG is recommended.  Follow-up as clinically indicated.  No evidence of adnexal mass.    Heterogeneous material within the endometrial canal likely representing hemorrhagic products.      Electronically signed by: Mariano Clayton MD  Date:    10/18/2023  Time:    11:53               Narrative:    EXAMINATION:  US OB <14 WEEKS TRANSABDOM, SINGLE GESTATION    CLINICAL HISTORY:  Vag Bleeding;    TECHNIQUE:  Ultrasound Ob pelvic, less than 14 weeks.    COMPARISON:  None available.    FINDINGS:  Uterus measures 8.2 x 5.7 x 9.5 cm.  No intrauterine gestational sac is visualized.  There is heterogeneous material within the endometrial canal likely representing hemorrhagic products.    Right ovary measures 2.3 x 2.4 x 1.9 cm with normal vascular flow.    Left ovary measures 3.1 x 2.3 x 2.7 cm with normal vascular flow.    Cervical length measures 3.7 cm.    No evidence of adnexal mass or fluid collection.                                       Medications   sodium chloride 0.9% bolus 1,000 mL 1,000 mL (1,000 mLs Intravenous New Bag 10/18/23 1038)     Medical Decision Making  This is an emergent evaluation of a 28 y.o. female who presents with vomiting, vaginal bleeding last night. The patient was seen and examined. The history and physical exam was obtained. The nursing notes and vital signs were reviewed. Secondary to symptoms and examination findings, I ordered pregnancy test, urinalysis, bloodwork, US.       Amount and/or Complexity of Data Reviewed  Labs: ordered.  Radiology: ordered.    Patient reports documented IUP 3 weeks ago at her OB.  Those records are not available to  me due to symptomatology ultrasound performed.  There appears to be no viable intrauterine pregnancy.  No evidence of ectopic pregnancy.  Patient is not in pain.  No peritoneal signs.  Likely miscarriage given history.  Recommend follow-up in 48 hours with OBGYN for recheck and to trend beta-hCGs back down to 0.  Patient verbally instructed to return to the emergency room immediately for any worsening pain or concerns or fever.        Scribe Attestation:   Scribe #1: I performed the above scribed service and the documentation accurately describes the services I performed. I attest to the accuracy of the note.                      I, Rosalio Han, personally performed the services described in this documentation.  All medical record entries made by the scribe were at my direction and in my presence.  I have reviewed the chart and agree that the record reflects my personal performance and is accurate and complete.    Clinical Impression:   Final diagnoses:  [O03.9] Miscarriage (Primary)        ED Disposition Condition    Discharge Stable          ED Prescriptions    None       Follow-up Information       Follow up With Specialties Details Why Contact Info    Rober Sy MD Obstetrics and Gynecology In 2 days for repeat beta hcg and follow up. [address]  [address]  [facility] [address]  [phone]               Rosalio Han MD  10/18/23 9909

## 2023-10-18 NOTE — ED TRIAGE NOTES
28 y.o female presents to the ED with chief complaint of vomiting and vaginal bleeding. Reports she is 8 weeks pregnant. Reports vaginal bleeding that started last night. Denies abdominal pain and back pain. Denies any other complaints. AAOx4, NAD.

## 2023-10-20 LAB — BACTERIA UR CULT: NORMAL

## 2024-03-10 ENCOUNTER — HOSPITAL ENCOUNTER (EMERGENCY)
Facility: HOSPITAL | Age: 29
Discharge: HOME | End: 2024-03-10
Attending: EMERGENCY MEDICINE
Payer: MEDICAID

## 2024-03-10 VITALS
SYSTOLIC BLOOD PRESSURE: 134 MMHG | HEIGHT: 66 IN | WEIGHT: 150 LBS | HEART RATE: 80 BPM | BODY MASS INDEX: 24.11 KG/M2 | RESPIRATION RATE: 20 BRPM | TEMPERATURE: 98 F | DIASTOLIC BLOOD PRESSURE: 84 MMHG | OXYGEN SATURATION: 100 %

## 2024-03-10 DIAGNOSIS — S99.922A INJURY OF LEFT FOOT, INITIAL ENCOUNTER: ICD-10-CM

## 2024-03-10 DIAGNOSIS — T25.222A BURN, FOOT, SECOND DEGREE, LEFT, INITIAL ENCOUNTER: Primary | ICD-10-CM

## 2024-03-10 DIAGNOSIS — M79.672 LEFT FOOT PAIN: ICD-10-CM

## 2024-03-10 LAB
B-HCG UR QL: NEGATIVE
CTP QC/QA: YES

## 2024-03-10 PROCEDURE — 16020 DRESS/DEBRID P-THICK BURN S: CPT | Mod: ER

## 2024-03-10 PROCEDURE — 90715 TDAP VACCINE 7 YRS/> IM: CPT | Mod: ER

## 2024-03-10 PROCEDURE — 81025 URINE PREGNANCY TEST: CPT | Mod: ER | Performed by: EMERGENCY MEDICINE

## 2024-03-10 PROCEDURE — 99284 EMERGENCY DEPT VISIT MOD MDM: CPT | Mod: 25,ER

## 2024-03-10 PROCEDURE — 81025 URINE PREGNANCY TEST: CPT | Mod: ER

## 2024-03-10 PROCEDURE — 90471 IMMUNIZATION ADMIN: CPT | Mod: ER

## 2024-03-10 PROCEDURE — 25000003 PHARM REV CODE 250: Mod: ER

## 2024-03-10 PROCEDURE — 63600175 PHARM REV CODE 636 W HCPCS: Mod: ER

## 2024-03-10 RX ORDER — BACITRACIN 500 [USP'U]/G
1 OINTMENT TOPICAL 2 TIMES DAILY
Status: COMPLETED | OUTPATIENT
Start: 2024-03-10 | End: 2024-03-10

## 2024-03-10 RX ORDER — METHOCARBAMOL 500 MG/1
1000 TABLET, FILM COATED ORAL 3 TIMES DAILY PRN
Qty: 30 TABLET | Refills: 0 | Status: SHIPPED | OUTPATIENT
Start: 2024-03-10

## 2024-03-10 RX ORDER — NAPROXEN 500 MG/1
500 TABLET ORAL 2 TIMES DAILY PRN
Qty: 30 TABLET | Refills: 0 | Status: SHIPPED | OUTPATIENT
Start: 2024-03-10

## 2024-03-10 RX ORDER — NAPROXEN 250 MG/1
500 TABLET ORAL
Status: COMPLETED | OUTPATIENT
Start: 2024-03-10 | End: 2024-03-10

## 2024-03-10 RX ORDER — BACITRACIN ZINC 500 UNIT/G
OINTMENT (GRAM) TOPICAL 2 TIMES DAILY
Qty: 14 G | Refills: 0 | Status: SHIPPED | OUTPATIENT
Start: 2024-03-10

## 2024-03-10 RX ADMIN — NAPROXEN 500 MG: 250 TABLET ORAL at 08:03

## 2024-03-10 RX ADMIN — TETANUS TOXOID, REDUCED DIPHTHERIA TOXOID AND ACELLULAR PERTUSSIS VACCINE, ADSORBED 0.5 ML: 5; 2.5; 8; 8; 2.5 SUSPENSION INTRAMUSCULAR at 08:03

## 2024-03-10 RX ADMIN — BACITRACIN 1 G: 500 OINTMENT TOPICAL at 08:03

## 2024-03-11 NOTE — ED PROVIDER NOTES
Encounter Date: 3/10/2024       History     Chief Complaint   Patient presents with    Foot Injury     Pain  to left  foot- states  dropped  a pot of hot water on foot  2  weeks  ago     29-year-old female, with no pertinent past medical history, who presents to the ED with a right foot injury onset 1 week ago. Patient reports 10/10 intermittent aching and throbbing pain. Patient reports associated numbness to her right toes and swelling to her right foot. Patient reports boiling water spilled on her foot when she accidentally dropped a pot on her right foot. Patient endorses right foot blisters that popped on their own. Patient attempted treatment with ibuprofen with mild relief 15.5 hours ago. Patient notes pain is exacerbated with pressure. No other exacerbating or alleviating factors. Patient denies fever, nausea, vomiting, or other associated symptoms. Patient does not recall her last tetanus vaccination.      The history is provided by the patient. No  was used.     Review of patient's allergies indicates:  No Known Allergies  Past Medical History:   Diagnosis Date    Asthma     Blood clot associated with vein wall inflammation     History of broken nose      Past Surgical History:   Procedure Laterality Date    CHOLECYSTECTOMY  2010    RECONSTRUCTION OF NOSE Bilateral 5/29/2018    Procedure: RECONSTRUCTION-NASAL;  Surgeon: Yao Briggs MD;  Location: [facility] OR [address] [place] [address];  Service: ENT;  Laterality: Bilateral;  Septoplasty set, rhinoplasty set     Family History   Problem Relation Age of Onset    Hypertension Mother     No Known Problems Brother     No Known Problems Brother     No Known Problems Brother     No Known Problems Father     No Known Problems Sister     No Known Problems Maternal Aunt     No Known Problems Maternal Uncle     No Known Problems Paternal Aunt     No Known Problems Paternal Uncle     No Known Problems Maternal Grandmother     No Known Problems Maternal Grandfather      No Known Problems Paternal Grandmother     No Known Problems Paternal Grandfather     Breast cancer Neg Hx     Colon cancer Neg Hx     Ovarian cancer Neg Hx     Amblyopia Neg Hx     Blindness Neg Hx     Cancer Neg Hx     Cataracts Neg Hx     Diabetes Neg Hx     Glaucoma Neg Hx     Macular degeneration Neg Hx     Retinal detachment Neg Hx     Strabismus Neg Hx     Stroke Neg Hx     Thyroid disease Neg Hx      Social History     Tobacco Use    Smoking status: Never    Smokeless tobacco: Never   Substance Use Topics    Alcohol use: Yes     Comment: occasionally    Drug use: Yes     Frequency: 2.0 times per week     Types: Marijuana     Comment: Smokes marijuana occasionally     Review of Systems   Constitutional:  Negative for chills, diaphoresis and fever.   Gastrointestinal:  Negative for nausea and vomiting.   Musculoskeletal:  Positive for arthralgias (Right foot) and joint swelling (Right foot).   Skin:  Positive for wound (Right foot burn).   Neurological:  Positive for numbness (Right foot digits). Negative for weakness.   All other systems reviewed and are negative.      Physical Exam     Initial Vitals [03/10/24 1932]   BP Pulse Resp Temp SpO2   (!) 150/93 85 16 97.9 °F (36.6 °C) 100 %      MAP       --         Physical Exam    Nursing note and vitals reviewed.  Constitutional: She appears well-developed and well-nourished. She is not diaphoretic. She is active. She does not appear ill. No distress.   HENT:   Head: Normocephalic and atraumatic.   Right Ear: External ear normal.   Left Ear: External ear normal.   Nose: Nose normal.   Eyes: Conjunctivae, EOM and lids are normal. Pupils are equal, round, and reactive to light. Right eye exhibits no discharge. Left eye exhibits no discharge.   Neck: Phonation normal. Neck supple.   Normal range of motion.  Cardiovascular:            Pulses:       Dorsalis pedis pulses are 2+ on the left side.   Pulmonary/Chest: Effort normal. No respiratory distress.    Abdominal: She exhibits no distension.   Musculoskeletal:         General: Normal range of motion.      Cervical back: Normal range of motion and neck supple.      Left ankle: Normal. No swelling or deformity. No tenderness. Normal range of motion.      Left foot: Normal range of motion and normal capillary refill. Bony tenderness (1st MTP) present. No swelling or laceration. Normal pulse.      Comments: 2nd degree burn to left dorsal midfoot with multiple blisters; some unroofed.  Blanching intact.  No erythema, warmth, purulent discharge, deformities, or swelling appreciated.  Normal sensation.  2+ DP pulses.  Normal range motion.      Neurological: She is alert and oriented to person, place, and time. She has normal strength. No sensory deficit. GCS eye subscore is 4. GCS verbal subscore is 5. GCS motor subscore is 6.   Skin: Skin is dry. Capillary refill takes less than 2 seconds.               ED Course   Procedures  Labs Reviewed   POCT URINE PREGNANCY          Imaging Results              X-Ray Foot Complete Right (Final result)  Result time 03/10/24 20:32:38   Procedure changed from X-Ray Foot Complete Left     Final result by Jai Garza MD (03/10/24 20:32:38)                   Impression:      No acute osseous abnormality identified.      Electronically signed by: Jai Garza MD  Date:    03/10/2024  Time:    20:32               Narrative:    EXAMINATION:  XR FOOT COMPLETE 3 VIEW RIGHT    CLINICAL HISTORY:  . Pain in left foot    TECHNIQUE:  AP, lateral, and oblique views of the right foot were performed.    COMPARISON:  None    FINDINGS:  No evidence of acute displaced fracture, dislocation, or osseous destructive process.  Lisfranc articulation is congruent.  No radiopaque retained foreign body seen.                                       Medications   bacitracin ointment 1 g (1 g Topical (Top) Given 3/10/24 2023)   Tdap (BOOSTRIX) vaccine injection 0.5 mL (0.5 mLs Intramuscular Given 3/10/24  2018)   naproxen tablet 500 mg (500 mg Oral Given 3/10/24 2018)     Medical Decision Making  29-year-old female, with no pertinent past medical history, who presents to the ED with a right foot injury onset 1 week ago.   Patient's chart and medical history reviewed.    Ddx:  Fracture  Dislocation  Contusion  1st-degree burn  2nd degree partial degree burn  2nd-degree deep degree burn  3rd-degree burn  4th degree burn    Patient's vitals reviewed.  Afebrile, no respiratory distress, and nontoxic-appearing in the ED. 2nd degree burn to left dorsal midfoot with multiple blisters; some unroofed.  Blanching intact.  No erythema, warmth, purulent discharge, deformities, or swelling appreciated.  Normal sensation.  2+ DP pulses.  Normal range motion.  UPT was negative.  Patient given naproxen for pain.  Patient updated on tetanus today.  Wound was cleaned and irrigated.  Bacitracin applied.  Nonadherent bandage applied. Foot x-ray was unremarkable per my personal interpretation. Official x-ray interpretation showed  No acute osseous abnormality identified.  Discussed with patient her burn is a 2nd degree burn and to keep the blisters intact if possible and a bone contusion from the trauma, she verbalized understanding.  Patient was sent home on naproxen, Robaxin, and bacitracin for symptomatic control.  Instructed patient to keep the area clean and dry and watch out for signs of infection including erythema, warmth, pus discharge, and fevers at home. Patient agrees with this plan. Discussed with her strict return precautions, she verbalized understanding. Patient is stable for discharge.       Amount and/or Complexity of Data Reviewed  External Data Reviewed: labs, radiology and notes.  Labs: ordered.  Radiology: ordered.    Risk  OTC drugs.  Prescription drug management.            Scribe Attestation:   Scribe #1: I performed the above scribed service and the documentation accurately describes the services I performed. I  attest to the accuracy of the note.              I, Alayna Holdsworth,PA-C, personally performed the services described in this documentation.  All medical record entries made by the scribe were at my direction and in my presence.  I have reviewed the chart and agree that the record reflects my personal performance and is accurate and complete.                   Clinical Impression:  Final diagnoses:  [M79.672] Left foot pain  [S99.922A] Injury of left foot, initial encounter  [T25.222A] Burn, foot, second degree, left, initial encounter (Primary)          ED Disposition Condition    Discharge Stable          ED Prescriptions       Medication Sig Dispense Start Date End Date Auth. Provider    bacitracin 500 unit/gram Oint Apply topically 2 (two) times daily. Apply to burn 14 g 3/10/2024 -- Holdsworth, Alayna, PA-C    naproxen (NAPROSYN) 500 MG tablet Take 1 tablet (500 mg total) by mouth 2 (two) times daily as needed (Pain). 30 tablet 3/10/2024 -- Holdsworth, Alayna, PA-C    methocarbamoL (ROBAXIN) 500 MG Tab Take 2 tablets (1,000 mg total) by mouth 3 (three) times daily as needed (Pain). 30 tablet 3/10/2024 -- Holdsworth, Alayna, PA-C          Follow-up Information       Follow up With Specialties Details Why Contact [facility] ED Emergency Medicine  If symptoms worsen [id number] [facility] [id number] [phone]             Holdsworth, Alayna, PA-C  03/10/24 2050

## 2024-03-11 NOTE — DISCHARGE INSTRUCTIONS

## 2024-03-11 NOTE — ED TRIAGE NOTES
Pt arrived to the ED via personal transport due to right foot burn. Pt reports while cooking pasta x2 week ago, pt accidentally dropped boiling water on right foot. Pain is getting worse with activity and rest. Pain currently 10/10. Pt ambulates independently with mild difficulty. Alert and oriented x4.

## 2024-04-25 NOTE — TELEPHONE ENCOUNTER
Chief Complaint   Patient presents with    Diabetes     History of Present Illness: Amrit Faustin is a 74 y.o. male here for follow up of diabetes. Weight down 10 lbs since last visit in 4/19. Has not had any further changes to his meds but does feel better. Has been trying to avoid snacking at night over the past 4 months to help with wt loss. Has been doing 30-32 units of humalog based on what he eats for dinner and will see readings in the  range fasting and hasn't seen many readings over 130. Still taking lantus 76 units twice daily. Compliant with victoza and metformin and blood pressure and lipid regimen. Current Outpatient Medications   Medication Sig    atorvastatin (LIPITOR) 80 mg tablet TAKE 1 TABLET DAILY    HUMALOG KWIKPEN INSULIN 100 unit/mL kwikpen INJECT 30 TO 40 UNITS BEFORE DINNER    BD ULTRA-FINE SHORT PEN NEEDLE 31 gauge x 5/16\" ndle USE AS DIRECTED THREE TIMES A DAY    metFORMIN (GLUCOPHAGE) 1,000 mg tablet TAKE 1 TABLET TWICE A DAY WITH MEALS    insulin glargine (LANTUS SOLOSTAR U-100 INSULIN) 100 unit/mL (3 mL) inpn Inject 76 units in the morning and 76 units in the evening--Dose change 4/29/19--updated med list--did not send prescription to the pharmacy    sacubitril-valsartan (ENTRESTO) 24 mg/26 mg tablet Take 1 Tab by mouth two (2) times a day.  carvedilol (COREG) 6.25 mg tablet Take 0.5 Tabs by mouth two (2) times a day.  VICTOZA 3-STEFANIE 0.6 mg/0.1 mL (18 mg/3 mL) pnij INJECT 1.8 MG UNDER THE SKIN DAILY    FREESTYLE LITE STRIPS strip USE TO TEST THREE TIMES A DAY    docusate sodium (COLACE) 100 mg capsule Take 100 mg by mouth nightly.  lansoprazole (PREVACID) 30 mg capsule Take  by mouth Daily (before breakfast).  FREESTYLE SYSTEM KIT monitoring kit Test 3 times daily Dx: 250.02    FREESTYLE LANCETS 28 gauge misc Test 3 times daily Dx: 250.02    aspirin delayed-release 81 mg tablet Take 162 mg by mouth nightly.     CETIRIZINE HCL (ZYRTEC PO) Take 10 mg Patient states she had been seen in the ED several days ago and was to f/u in ophthalmology. She states her left eye continues to be red and somewhat painful. She will see Dr. Roa today. Ms. Smart verbalized her understanding.   by mouth nightly.  coenzyme q10 200 mg Cap Take  by mouth nightly.  MULTIVITAMINS (MULTIVITAMIN PO) Take  by mouth daily. No current facility-administered medications for this visit. No Known Allergies     Review of Systems:  - Eyes: no blurry vision or double vision  - Cardiovascular: no chest pain  - Respiratory: no shortness of breath  - Musculoskeletal: no myalgias  - Neurological: no numbness/tingling in extremities    Physical Examination:  Blood pressure 126/61, pulse 77, resp. rate 16, height 6' (1.829 m), weight 294 lb 3.2 oz (133.4 kg), SpO2 97 %. - General: pleasant, no distress, good eye contact   - Neck: no carotid bruits  - Cardiovascular: regular, normal rate, nl s1 and s2, no m/r/g,   - Respiratory: clear bilaterally  - Integumentary: no edema,   - Psychiatric: normal mood and affect    Diabetic foot exam:     Left Foot:   Visual Exam: callous - mild   Pulse DP: 2+ (normal)   Filament test: reduced sensation    Vibratory sensation: absent      Right Foot:   Visual Exam: callous - mild   Pulse DP: 2+ (normal)   Filament test: reduced sensation    Vibratory sensation: absent        Data Reviewed:   Component      Latest Ref Rng & Units 10/25/2019 10/25/2019 10/25/2019           9:27 AM  9:27 AM  9:27 AM   Glucose      65 - 99 mg/dL  142 (H)    BUN      8 - 27 mg/dL  15    Creatinine      0.76 - 1.27 mg/dL  0.90    GFR est non-AA      >59 mL/min/1.73  87    GFR est AA      >59 mL/min/1.73  101    BUN/Creatinine ratio      10 - 24  17    Sodium      134 - 144 mmol/L  144    Potassium      3.5 - 5.2 mmol/L  4.6    Chloride      96 - 106 mmol/L  108 (H)    CO2      20 - 29 mmol/L  22    Calcium      8.6 - 10.2 mg/dL  9.2    Protein, total      6.0 - 8.5 g/dL  6.4    Albumin      3.6 - 4.8 g/dL  4.0    GLOBULIN, TOTAL      1.5 - 4.5 g/dL  2.4    A-G Ratio      1.2 - 2.2  1.7    Bilirubin, total      0.0 - 1.2 mg/dL  0.3    Alk.  phosphatase      39 - 117 IU/L  98    AST      0 - 40 IU/L  29 ALT (SGPT)      0 - 44 IU/L  36    Cholesterol, total      100 - 199 mg/dL 104     Triglyceride      0 - 149 mg/dL 84     HDL Cholesterol      >39 mg/dL 33 (L)     VLDL, calculated      5 - 40 mg/dL 17     LDL, calculated      0 - 99 mg/dL 54     Hemoglobin A1c, (calculated)      4.8 - 5.6 %   6.8 (H)   Estimated average glucose      mg/dL   148       Assessment/Plan:     1. DM w/o complication type II, uncontrolled (250.02) his most recent Hgb A1c was 6.8% in 10/19 down from 7% in 4/19 down from 7.3% in 10/18 down from 7.9% in 4/18 up from 7% in 10/17 down from 8.5% in 5/17 up from 8.2% in 11/16 up from 7.7% in 6/16 down from 8.4% in 3/16 up from 7.5% in 10/15 up from 7.4% in 5/15 stable from 12/14 down from 7.6% in 7/14 stable from 3/14 up from 7.5% in 11/13 down from 7.7% in July up from 7.1% in March 2013 down from 7.7% in Nov down from 8.3% in August up from 7.7% in April stable from 7.6% in January 2012 down from 8.8% in October 2011 up from 8.4% in July, 7.4% in April stable from 7.5% in January up from 6.7% in October 2010 down from 8.6% in June. His A1c remains at goal so will start decreasing the morning dose of lantus. - decrease Lantus to 72 units in am and cont 76 units in pm as 2 separate injections    - cont Victoza 1.8 mg daily   - cont Metformin 1000 mg bid  - cont humalog 30-35 units before dinner + 5 units for every 50 mg/dl above 150 mg/dl  - take humalog 5 units for every 50 mg/dl above 150 mg/dl at breakfast and bedtime  - check bs 3 times daily  - optho UTD 4/18--will obtain report  - microalbumin nl 12/14, up to 43 in 11/16, down to 10 in 5/17 and 13 in 4/18 and 4 in 4/19  - foot exam done 10/19  - check A1c and bmp and microalbumin prior to next visit     2. Unspecified essential hypertension (401.9) his BP was at goal < 140/90.    -  cont current regimen for now     3. Other and unspecified hyperlipidemia (272.4) Given DM and CAD, Goal LDL < 70, non-HDL < 100, and TG < 150.  Lipids at goal in November 2013 and July 2014 and Oct 2015. LDL 79 in 11/16 with higher A1c but down to 57 in 10/17 with lower A1c. LDL 69 in 4/18 and 54 in 10/18 and in 10/19  - cont Lipitor 80 daily  - check lipids in 10/20         Patient Instructions   1) Decrease the lantus to 72 units in the morning as 2 smaller shots of 36 units back to back. Stay on 76 units at night. As long as your sugars are staying under 130 in the morning after 4 days, then try cutting back by 4 more units on the morning dose every 4 days as long as your sugars are staying under 130 in the morning. 2) Your cholesterol and liver and kidney are normal and blood pressure is very good. Follow-up and Dispositions    · Return in about 6 months (around 4/29/2020).                Copy sent to:  Dr. Elier Guallpa Males [phone] Both arterial and venous

## 2025-03-13 ENCOUNTER — HOSPITAL ENCOUNTER (EMERGENCY)
Facility: HOSPITAL | Age: 30
Discharge: HOME | End: 2025-03-13
Attending: EMERGENCY MEDICINE
Payer: MEDICAID

## 2025-03-13 VITALS
BODY MASS INDEX: 27.32 KG/M2 | WEIGHT: 164 LBS | DIASTOLIC BLOOD PRESSURE: 87 MMHG | TEMPERATURE: 99 F | HEART RATE: 110 BPM | OXYGEN SATURATION: 98 % | SYSTOLIC BLOOD PRESSURE: 128 MMHG | HEIGHT: 65 IN | RESPIRATION RATE: 18 BRPM

## 2025-03-13 DIAGNOSIS — R05.9 COUGH: ICD-10-CM

## 2025-03-13 DIAGNOSIS — J06.9 VIRAL URI WITH COUGH: Primary | ICD-10-CM

## 2025-03-13 LAB
B-HCG UR QL: NEGATIVE
CTP QC/QA: YES
CTP QC/QA: YES
INFLUENZA A ANTIGEN, POC: NEGATIVE
INFLUENZA B ANTIGEN, POC: NEGATIVE
SARS-COV-2 RDRP RESP QL NAA+PROBE: NEGATIVE

## 2025-03-13 PROCEDURE — 87635 SARS-COV-2 COVID-19 AMP PRB: CPT | Mod: ER

## 2025-03-13 PROCEDURE — 99283 EMERGENCY DEPT VISIT LOW MDM: CPT | Mod: 25,ER

## 2025-03-13 PROCEDURE — 87804 INFLUENZA ASSAY W/OPTIC: CPT | Mod: 59,ER

## 2025-03-13 PROCEDURE — 81025 URINE PREGNANCY TEST: CPT | Mod: ER

## 2025-03-13 RX ORDER — LEVOCETIRIZINE DIHYDROCHLORIDE 5 MG/1
5 TABLET, FILM COATED ORAL NIGHTLY
Qty: 14 TABLET | Refills: 0 | Status: SHIPPED | OUTPATIENT
Start: 2025-03-13 | End: 2025-03-27

## 2025-03-13 RX ORDER — GUAIFENESIN AND DEXTROMETHORPHAN HYDROBROMIDE 1200; 60 MG/1; MG/1
1 TABLET, EXTENDED RELEASE ORAL EVERY 12 HOURS
Qty: 14 TABLET | Refills: 0 | Status: SHIPPED | OUTPATIENT
Start: 2025-03-13 | End: 2025-03-20

## 2025-03-13 RX ORDER — IBUPROFEN 600 MG/1
600 TABLET ORAL EVERY 6 HOURS PRN
Qty: 20 TABLET | Refills: 0 | Status: SHIPPED | OUTPATIENT
Start: 2025-03-13

## 2025-03-13 RX ORDER — BENZONATATE 200 MG/1
200 CAPSULE ORAL 3 TIMES DAILY PRN
Qty: 15 CAPSULE | Refills: 0 | Status: SHIPPED | OUTPATIENT
Start: 2025-03-13 | End: 2025-03-23

## 2025-03-13 NOTE — Clinical Note
"Mackenzie"Samantha Smart was seen and treated in our emergency department on 3/13/2025.  She may return to work on 03/14/2025.       If you have any questions or concerns, please don't hesitate to call.      Connie Devine MD"

## 2025-03-13 NOTE — DISCHARGE INSTRUCTIONS
You do not have covid, flu or pneumonia. You most likely have some other virus. Take medications as directed. Drink plenty of water. Return to ER for any concerns or worsening symptoms.

## 2025-03-13 NOTE — ED PROVIDER NOTES
Encounter Date: 3/13/2025    SCRIBE #1 NOTE: I, Kavya Staples, am scribing for, and in the presence of,  Connie Devine MD.       History     Chief Complaint   Patient presents with    Cough     Cough, congestion onset 2 days ago.      Mackenzie Smart is a 30 y.o. female, with a past medical history of asthma, who presents to the ED with a productive cough with dark sputum that began 3 days ago. Patient reports associated congestion, subjective fever and chills, headache, and chest wall pain secondary to coughing. Attempted treatment with Dayquil, ibuprofen, and Theraflu with relief. Patient notes her child's father was recently diagnosed with influenza. Patient denies any associated vomiting, diarrhea, or sore throat. Patient denies smoking tobacco, admits to occasional EtOH and marijuana usage. NKDA.     The history is provided by the patient. No  was used.     Review of patient's allergies indicates:  No Known Allergies  Past Medical History:   Diagnosis Date    Asthma     Blood clot associated with vein wall inflammation     History of broken nose      Past Surgical History:   Procedure Laterality Date    CHOLECYSTECTOMY  2010    RECONSTRUCTION OF NOSE Bilateral 5/29/2018    Procedure: RECONSTRUCTION-NASAL;  Surgeon: Yao Briggs MD;  Location: [facility] OR [address] [place] [address];  Service: ENT;  Laterality: Bilateral;  Septoplasty set, rhinoplasty set     Family History   Problem Relation Name Age of Onset    Hypertension Mother      No Known Problems Brother      No Known Problems Brother      No Known Problems Brother      No Known Problems Father      No Known Problems Sister      No Known Problems Maternal Aunt      No Known Problems Maternal Uncle      No Known Problems Paternal Aunt      No Known Problems Paternal Uncle      No Known Problems Maternal Grandmother      No Known Problems Maternal Grandfather      No Known Problems Paternal Grandmother      No Known Problems Paternal Grandfather       Breast cancer Neg Hx      Colon cancer Neg Hx      Ovarian cancer Neg Hx      Amblyopia Neg Hx      Blindness Neg Hx      Cancer Neg Hx      Cataracts Neg Hx      Diabetes Neg Hx      Glaucoma Neg Hx      Macular degeneration Neg Hx      Retinal detachment Neg Hx      Strabismus Neg Hx      Stroke Neg Hx      Thyroid disease Neg Hx       Social History[1]  Review of Systems   Constitutional:  Positive for chills (subjective) and fever (subjective). Negative for activity change and appetite change.   HENT:  Positive for congestion. Negative for rhinorrhea, sneezing and sore throat.    Respiratory:  Positive for cough. Negative for choking, shortness of breath and wheezing.         (+) chest wall pain secondary to coughing    Cardiovascular:  Negative for chest pain and palpitations.   Gastrointestinal:  Negative for abdominal pain, diarrhea, nausea and vomiting.   Neurological:  Positive for headaches. Negative for dizziness, syncope and light-headedness.   All other systems reviewed and are negative.      Physical Exam     Initial Vitals [03/13/25 0915]   BP Pulse Resp Temp SpO2   128/87 110 18 98.8 °F (37.1 °C) 98 %      MAP       --         Physical Exam    Nursing note and vitals reviewed.  Constitutional: She appears well-developed and well-nourished. No distress.   HENT:   Head: Normocephalic and atraumatic.   Eyes: Conjunctivae are normal.   Neck:   Normal range of motion.  Cardiovascular:  Normal rate, regular rhythm and normal heart sounds.           No murmur heard.  Pulmonary/Chest: Effort normal and breath sounds normal. No respiratory distress.   Abdominal: Abdomen is soft. Bowel sounds are normal. She exhibits no distension. There is no abdominal tenderness.   Musculoskeletal:         General: Normal range of motion.      Cervical back: Normal range of motion.     Neurological: She is alert and oriented to person, place, and time.   Skin: Skin is warm and dry.   Psychiatric: She has a normal mood and  affect. Her behavior is normal.         ED Course   Procedures  Labs Reviewed   SARS-COV-2 RDRP GENE       Result Value    POC Rapid COVID Negative       Acceptable Yes      Narrative:     This test utilizes isothermal nucleic acid amplification technology to detect the SARS-CoV-2 RdRp nucleic acid segment. The analytical sensitivity (limit of detection) is 500 copies/swab.     A POSITIVE result is indicative of the presence of SARS-CoV-2 RNA; clinical correlation with patient history and other diagnostic information is necessary to determine patient infection status.    A NEGATIVE result means that SARS-CoV-2 nucleic acids are not present above the limit of detection. A NEGATIVE result should be treated as presumptive. It does not rule out the possibility of COVID-19 and should not be the sole basis for treatment decisions. If COVID-19 is strongly suspected based on clinical and exposure history, re-testing using an alternate molecular assay should be considered.     Commercial kits are provided by [employer].       POCT URINE PREGNANCY    POC Preg Test, Ur Negative       Acceptable Yes     POCT INFLUENZA A/B MOLECULAR   POCT RAPID INFLUENZA A/B    Influenza B Ag negative      Inflenza A Ag negative            Imaging Results              X-Ray Chest PA And Lateral (Final result)  Result time 03/13/25 09:43:48      Final result by Deny Orellana MD (03/13/25 09:43:48)                   Impression:      No acute abnormality.      Electronically signed by: Deny Orellana MD  Date:    03/13/2025  Time:    09:43               Narrative:    EXAMINATION:  XR CHEST PA AND LATERAL    CLINICAL HISTORY:  Cough, unspecified    TECHNIQUE:  PA and lateral views of the chest were performed.    COMPARISON:  Chest radiograph dated December 21, 2017    FINDINGS:  The trachea and cardiomediastinal silhouette are within normal limits.  There is no evidence of pleural effusions, pneumothoraces or  consolidations.  Lungs are clear.  Osseous structures demonstrate no evidence for acute fractures or dislocations.                                       Medications - No data to display  Medical Decision Making  30F with asthma and a blood clot associated with vein wall inflammation, presents with a productive cough, congestion, subjective fevers/chills, headache, and chest wall pain secondary to coughing. On exam, no fever, looks well, clear breath sounds. In shared decision making with the patient I will order UPT, COVID/influenza swabs, and XR chest. She is not pregnant. No covid, flu or pneumonia. Likely other viral etiology - treat symptomatically. Rx xyzal, mucinex dm, tessalon perles, motrin.     Amount and/or Complexity of Data Reviewed  Labs: ordered. Decision-making details documented in ED Course.  Radiology: ordered. Decision-making details documented in ED Course.    Risk  OTC drugs.  Prescription drug management.            Scribe Attestation:   Scribe #1: I performed the above scribed service and the documentation accurately describes the services I performed. I attest to the accuracy of the note.                             I, Dr. Connie Devine, personally performed the services described in this documentation.   All medical record entries made by the scribe were at my direction and in my presence.   I have reviewed the chart and agree that the record is accurate and complete.   Connie Devine MD.  9:25 AM 03/13/2025     Clinical Impression:  Final diagnoses:  [R05.9] Cough  [J06.9] Viral URI with cough (Primary)          ED Disposition Condition    Discharge Stable          ED Prescriptions       Medication Sig Dispense Start Date End Date Auth. Provider    levocetirizine (XYZAL) 5 MG tablet Take 1 tablet (5 mg total) by mouth every evening. for 14 days 14 tablet 3/13/2025 3/27/2025 Connie Devine MD    dextromethorphan-guaiFENesin 60-1,200 mg per 12 hr tablet Take 1 tablet by mouth every 12 (twelve) hours.  for 7 days 14 tablet 3/13/2025 3/20/2025 Connie Devine MD    benzonatate (TESSALON) 200 MG capsule Take 1 capsule (200 mg total) by mouth 3 (three) times daily as needed for Cough. 15 capsule 3/13/2025 3/23/2025 Connie Devine MD    ibuprofen (ADVIL,MOTRIN) 600 MG tablet Take 1 tablet (600 mg total) by mouth every 6 (six) hours as needed for Pain. 20 tablet 3/13/2025 -- Connie Devine MD          Follow-up Information       Follow up With Specialties Details Why Contact [facility] ED Emergency Medicine  If symptoms worsen [address] [facility] [address] [phone]                 [1]   Social History  Tobacco Use    Smoking status: Never    Smokeless tobacco: Never   Substance Use Topics    Alcohol use: Yes     Comment: occasionally    Drug use: Yes     Frequency: 2.0 times per week     Types: Marijuana     Comment: Smokes marijuana occasionally        Connie Devine MD  03/13/25 1037

## 2025-05-05 ENCOUNTER — HOSPITAL ENCOUNTER (EMERGENCY)
Facility: HOSPITAL | Age: 30
Discharge: HOME | End: 2025-05-06
Attending: STUDENT IN AN ORGANIZED HEALTH CARE EDUCATION/TRAINING PROGRAM
Payer: MEDICAID

## 2025-05-05 DIAGNOSIS — S92.512A CLOSED DISPLACED FRACTURE OF PROXIMAL PHALANX OF LESSER TOE OF LEFT FOOT, INITIAL ENCOUNTER: ICD-10-CM

## 2025-05-05 DIAGNOSIS — M79.675 TOE PAIN, LEFT: Primary | ICD-10-CM

## 2025-05-05 LAB
B-HCG UR QL: NEGATIVE
CTP QC/QA: YES

## 2025-05-05 PROCEDURE — 99283 EMERGENCY DEPT VISIT LOW MDM: CPT | Mod: 25,ER

## 2025-05-05 PROCEDURE — 81025 URINE PREGNANCY TEST: CPT | Mod: ER | Performed by: STUDENT IN AN ORGANIZED HEALTH CARE EDUCATION/TRAINING PROGRAM

## 2025-05-05 NOTE — Clinical Note
"Mackenzie Ac" Berry was seen and treated in our emergency department on 5/5/2025.  She may return to work on 05/07/2025.       If you have any questions or concerns, please don't hesitate to call.       RN    "

## 2025-05-06 VITALS
DIASTOLIC BLOOD PRESSURE: 71 MMHG | RESPIRATION RATE: 16 BRPM | HEART RATE: 76 BPM | WEIGHT: 164 LBS | BODY MASS INDEX: 26.36 KG/M2 | TEMPERATURE: 99 F | SYSTOLIC BLOOD PRESSURE: 125 MMHG | OXYGEN SATURATION: 97 % | HEIGHT: 66 IN

## 2025-05-06 RX ORDER — METHOCARBAMOL 500 MG/1
1000 TABLET, FILM COATED ORAL 3 TIMES DAILY
Qty: 30 TABLET | Refills: 0 | Status: SHIPPED | OUTPATIENT
Start: 2025-05-06 | End: 2025-05-11

## 2025-05-06 RX ORDER — IBUPROFEN 600 MG/1
600 TABLET, FILM COATED ORAL EVERY 6 HOURS PRN
Qty: 20 TABLET | Refills: 0 | Status: SHIPPED | OUTPATIENT
Start: 2025-05-06

## 2025-05-06 RX ORDER — ACETAMINOPHEN 500 MG
500 TABLET ORAL EVERY 6 HOURS PRN
Qty: 20 TABLET | Refills: 0 | Status: SHIPPED | OUTPATIENT
Start: 2025-05-06

## 2025-05-06 NOTE — DISCHARGE INSTRUCTIONS
Thank you for coming to our Emergency Department today. It is important to remember that some problems are difficult to diagnose and may not be found during your first visit. Be sure to follow up with your primary care doctor and review any labs/imaging that was performed with them. If you do not have a primary care doctor, you may contact the one listed on your discharge paperwork or you may also call the [facility] Appointment Desk at [phone] to schedule an appointment with one.     All medications may potentially have side effects and it is impossible to predict which medications may give you side effects. If you feel that you are having a negative effect of any medication you should immediately stop taking them and seek medical attention.    Return to the ER with any questions/concerns, new/concerning symptoms, worsening or failure to improve. Do not drive or make any important decisions for 24 hours if you have received any pain medications, sedatives or mood altering drugs during your ER visit.

## 2025-05-06 NOTE — ED PROVIDER NOTES
Encounter Date: 5/5/2025       History     Chief Complaint   Patient presents with    Toe Injury     C/O LEFT TOE PAIN X 2 DAYS     30 y.o. female who has a past medical history of Asthma, Blood clot associated with vein wall inflammation, and History of broken nose. presents to the emergency department due to  left toe pain for the past 2 days after hitting it against the wall.  Patient reports injury occurred 2 days ago and ever since has been having significant difficulty ambulating.  She reports mild swelling to the base of the digit.  Denies any numbness or tingling.  No medication taken prior to ED presentation.    The history is provided by the patient.     Review of patient's allergies indicates:  No Known Allergies  Past Medical History:   Diagnosis Date    Asthma     Blood clot associated with vein wall inflammation     History of broken nose      Past Surgical History:   Procedure Laterality Date    CHOLECYSTECTOMY  2010    RECONSTRUCTION OF NOSE Bilateral 5/29/2018    Procedure: RECONSTRUCTION-NASAL;  Surgeon: Yao Briggs MD;  Location: [facility] OR [address] [place] [address];  Service: ENT;  Laterality: Bilateral;  Septoplasty set, rhinoplasty set     Family History   Problem Relation Name Age of Onset    Hypertension Mother      No Known Problems Brother      No Known Problems Brother      No Known Problems Brother      No Known Problems Father      No Known Problems Sister      No Known Problems Maternal Aunt      No Known Problems Maternal Uncle      No Known Problems Paternal Aunt      No Known Problems Paternal Uncle      No Known Problems Maternal Grandmother      No Known Problems Maternal Grandfather      No Known Problems Paternal Grandmother      No Known Problems Paternal Grandfather      Breast cancer Neg Hx      Colon cancer Neg Hx      Ovarian cancer Neg Hx      Amblyopia Neg Hx      Blindness Neg Hx      Cancer Neg Hx      Cataracts Neg Hx      Diabetes Neg Hx      Glaucoma Neg Hx      Macular degeneration  Neg Hx      Retinal detachment Neg Hx      Strabismus Neg Hx      Stroke Neg Hx      Thyroid disease Neg Hx       Social History[1]  Review of Systems   Constitutional:  Negative for fever.   HENT:  Negative for sore throat.    Respiratory:  Negative for shortness of breath.    Cardiovascular:  Negative for chest pain.   Gastrointestinal:  Negative for nausea.   Genitourinary:  Negative for dysuria.   Musculoskeletal:  Positive for arthralgias. Negative for back pain.   Skin:  Negative for rash.   Neurological:  Negative for weakness.   Hematological:  Does not bruise/bleed easily.       Physical Exam     Initial Vitals [05/05/25 2246]   BP Pulse Resp Temp SpO2   130/85 88 18 98.6 °F (37 °C) 99 %      MAP       --         Physical Exam    Constitutional: She is not diaphoretic. No distress.   Eyes: Conjunctivae and EOM are normal.   Cardiovascular:  Normal rate and regular rhythm.           Pulmonary/Chest: No respiratory distress.   Musculoskeletal:        Feet:      Lymphadenopathy:     She has no cervical adenopathy.   Neurological: She is oriented to person, place, and time.   Skin: Skin is warm. Capillary refill takes less than 2 seconds.   Psychiatric: Her behavior is normal.         ED Course   Procedures  Labs Reviewed   POCT URINE PREGNANCY       Result Value    POC Preg Test, Ur Negative       Acceptable Yes            Imaging Results              X-Ray Toe 2 or More Views Left (Final result)  Result time 05/06/25 00:18:39      Final result by Jai Garza MD (05/06/25 00:18:39)                   Impression:      Acute nondisplaced fracture of the 5th toe proximal phalanx.      Electronically signed by: Jai Garza MD  Date:    05/06/2025  Time:    00:18               Narrative:    EXAMINATION:  XR TOE 2 OR MORE VIEWS LEFT    CLINICAL HISTORY:  Injury to 5th toe;    TECHNIQUE:  Three views of the left toes were performed    COMPARISON:  None.    FINDINGS:  Acute nondisplaced  fracture is seen of the 5th toe proximal phalanx.  No additional acute displaced fracture or dislocation seen.  No radiopaque retained foreign body identified.                                       Medications - No data to display  Medical Decision Making:   Initial Assessment:   30 y.o. female who has a past medical history of Asthma, Blood clot associated with vein wall inflammation, and History of broken nose. presents to the emergency department due to  left toe pain for the past 2 days after hitting it against the wall.  Patient in no acute distress.  X-ray imaging notable for acute nondisplaced fracture of the 5th proximal phalanx.  Closed injury.  Patient placed in a hard sole boot referral for Podiatry placed. Pt is currently stable for discharge. I see no indication of an emergent process beyond that addressed during our encounter but have duly counseled the patient/family regarding the need for prompt follow-up as well as the indications that should prompt immediate return to the emergency room should new or worrisome developments occur. I discussed the ED work up and diagnostic findings with the patient/family. The patient/family has been provided with verbal and printed direction regarding our final diagnosis(es) as well as instructions regarding use of OTC and/or Rx medications intended to manage the patient's aforementioned conditions. The patient/family verbalized an understanding. The patient/family is asked if there are any questions or concerns. We discuss the case, until all issues are addressed to the patient/family's satisfaction. Patient/family understands and is agreeable to the plan.   Differential Diagnosis:   Differential Diagnosis includes, but is not limited to:  Fracture, dislocation, compartment syndrome, nerve injury/palsy, vascular injury, DVT, rhabdomyolysis, hemarthrosis, septic joint, cellulitis, bursitis, muscle strain, ligament tear/sprain, laceration, foreign body, abrasion,  soft tissue contusion, osteoarthritis.  Clinical Tests:   Radiological Study: Ordered and Reviewed                     Medical Decision Making  Amount and/or Complexity of Data Reviewed  Labs: ordered.  Radiology: ordered.    Risk  OTC drugs.  Prescription drug management.           Clinical Impression:   Final diagnoses:  [M79.675] Toe pain, left (Primary)  [S92.512A] Closed displaced fracture of proximal phalanx of lesser toe of left foot, initial encounter          ED Disposition Condition    Discharge Stable          ED Prescriptions       Medication Sig Dispense Start Date End Date Auth. Provider    ibuprofen (ADVIL,MOTRIN) 600 MG tablet Take 1 tablet (600 mg total) by mouth every 6 (six) hours as needed for Pain. 20 tablet 5/6/2025 -- Irineo Razo MD    acetaminophen (TYLENOL) 500 MG tablet Take 1 tablet (500 mg total) by mouth every 6 (six) hours as needed for Pain. 20 tablet 5/6/2025 -- Irineo Razo MD    methocarbamoL (ROBAXIN) 500 MG Tab Take 2 tablets (1,000 mg total) by mouth 3 (three) times daily. for 5 days 30 tablet 5/6/2025 5/11/2025 Irineo Razo MD          Follow-up Information       Follow up With Specialties Details Why Contact [facility] ED Emergency Medicine  If symptoms worsen [address] [facility] [address] [facility] [address] [phone]    Primary care doctor  Schedule an appointment as soon as possible for a visit  for reassesment     Podiatry  Schedule an appointment as soon as possible for a visit  for reassesment             DISCLAIMER: This note was prepared with [employer] voice recognition transcription software. Garbled syntax, mangled pronouns, and other bizarre constructions may be attributed to that software system.         [1]   Social History  Tobacco Use    Smoking status: Never    Smokeless tobacco: Never   Substance Use Topics    Alcohol use: Yes     Comment: occasionally    Drug use: Yes     Frequency: 2.0 times per week     Types: Marijuana      Comment: Smokes marijuana occasionally        Irineo Razo MD  05/06/25 0044

## (undated) DEVICE — SHEET EENT SPLIT

## (undated) DEVICE — SEE MEDLINE ITEM 157117

## (undated) DEVICE — Device

## (undated) DEVICE — BLADE SURG #15 CARBON STEEL

## (undated) DEVICE — GAUZE SPONGE 4X4 12PLY

## (undated) DEVICE — ELECTRODE REM PLYHSV RETURN 9

## (undated) DEVICE — SUT ETHILON 3-0 PS2 18 BLK

## (undated) DEVICE — CONTAINER SPECIMEN STRL 4OZ

## (undated) DEVICE — ELECTRODE BLADE INSULATED 1 IN

## (undated) DEVICE — SEE MEDLINE ITEM 154981

## (undated) DEVICE — SUT 5/0 18IN COATED VICRYL

## (undated) DEVICE — CANISTER SUCTION 2 LTR

## (undated) DEVICE — COVER LIGHT HANDLE 80/CA

## (undated) DEVICE — CORD BIPOLAR 12 FOOT

## (undated) DEVICE — SUT VICRYL PLUS 3-0 SH 18IN

## (undated) DEVICE — TRAY MINOR GEN SURG

## (undated) DEVICE — SEE MEDLINE ITEM 156913

## (undated) DEVICE — SEE MEDLINE ITEM 152622

## (undated) DEVICE — SKINMARKER & RULER REGULAR X-F

## (undated) DEVICE — SEE MEDLINE ITEM 157128

## (undated) DEVICE — COVER OVERHEAD SURG LT BLUE

## (undated) DEVICE — NDL HYPO REG 25G X 1 1/2

## (undated) DEVICE — SEE MEDLINE ITEM 146372

## (undated) DEVICE — SUT 4-0 VICRYL / SH

## (undated) DEVICE — PAD PREP 50/CA

## (undated) DEVICE — STAPLER SKIN ROTATING HEAD

## (undated) DEVICE — SPONGE GAUZE 16PLY 4X4

## (undated) DEVICE — GAUZE SPONGE PEANUT STRL

## (undated) DEVICE — SEE MEDLINE ITEM 157181

## (undated) DEVICE — SPLINT NASAL AIRWAY SEPTAL SIL

## (undated) DEVICE — SUT VICRYL 4-0 RB1 27IN UD

## (undated) DEVICE — CATH SELF-CATH FEMALE 14FR 6IN

## (undated) DEVICE — GOWN SURGICAL X-LARGE